# Patient Record
Sex: MALE | Race: WHITE | Employment: OTHER | URBAN - METROPOLITAN AREA
[De-identification: names, ages, dates, MRNs, and addresses within clinical notes are randomized per-mention and may not be internally consistent; named-entity substitution may affect disease eponyms.]

---

## 2017-03-22 PROCEDURE — 81003 URINALYSIS AUTO W/O SCOPE: CPT | Performed by: FAMILY MEDICINE

## 2017-03-22 PROCEDURE — 82570 ASSAY OF URINE CREATININE: CPT | Performed by: FAMILY MEDICINE

## 2017-03-22 PROCEDURE — 82043 UR ALBUMIN QUANTITATIVE: CPT | Performed by: FAMILY MEDICINE

## 2017-03-24 ENCOUNTER — APPOINTMENT (OUTPATIENT)
Dept: GENERAL RADIOLOGY | Facility: HOSPITAL | Age: 67
End: 2017-03-24
Attending: EMERGENCY MEDICINE
Payer: MEDICARE

## 2017-03-24 ENCOUNTER — HOSPITAL ENCOUNTER (EMERGENCY)
Facility: HOSPITAL | Age: 67
Discharge: HOME | End: 2017-03-24
Attending: EMERGENCY MEDICINE
Payer: MEDICARE

## 2017-03-24 VITALS
BODY MASS INDEX: 36.29 KG/M2 | SYSTOLIC BLOOD PRESSURE: 177 MMHG | OXYGEN SATURATION: 96 % | RESPIRATION RATE: 18 BRPM | HEIGHT: 69 IN | WEIGHT: 245 LBS | DIASTOLIC BLOOD PRESSURE: 85 MMHG | HEART RATE: 68 BPM | TEMPERATURE: 99 F

## 2017-03-24 DIAGNOSIS — S96.911A RIGHT FOOT STRAIN, INITIAL ENCOUNTER: Primary | ICD-10-CM

## 2017-03-24 PROCEDURE — 99283 EMERGENCY DEPT VISIT LOW MDM: CPT

## 2017-03-24 PROCEDURE — 73630 X-RAY EXAM OF FOOT: CPT

## 2017-03-24 RX ORDER — TRAMADOL HYDROCHLORIDE 50 MG/1
50 TABLET ORAL EVERY 8 HOURS PRN
Qty: 20 TABLET | Refills: 0 | Status: SHIPPED | OUTPATIENT
Start: 2017-03-24 | End: 2017-06-28 | Stop reason: ALTCHOICE

## 2017-03-24 NOTE — ED NOTES
Pt states he has a pair of crutches at home which he feels will be appropriate for his height. Awaiting cast shoe at this time from [employer] in proper size of pt.

## 2017-03-24 NOTE — ED PROVIDER NOTES
Patient Seen in: [facility] Emergency Department    History   Patient presents with:  Lower Extremity Injury (musculoskeletal)    Stated Complaint: toe injury    HPI    Patient is a 72-year-old male who presents emergency room with a history of slip total) by mouth every 8 (eight) hours as needed for Pain.    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 Patient had x-rays done as noted above. Patient was placed into a postop shoe and given crutches. Patient given prescription for Ultram for pain at home.   Patient was instructed to rest

## 2017-03-24 NOTE — ED NOTES
Pt states he was walking down the steps yesterday and right great toe got pulled back. Pt then fell, landing on right knee. Pt states he also struck his elbow, but states pain to elbow and knee is minimal. Pt states he is primarily having pain to toe.  Pt s

## 2017-03-24 NOTE — ED INITIAL ASSESSMENT (HPI)
Pt presents to the ED with complaints of right great toe pain. Pt states he fell last night and toe bent back. Pt awake and alert,skin w/d,resps reg/unlabored. + tenderness to toe with palpation.

## 2017-09-15 PROBLEM — K09.1: Status: ACTIVE | Noted: 2017-09-15

## 2017-09-20 ENCOUNTER — HOSPITAL ENCOUNTER (OUTPATIENT)
Facility: HOSPITAL | Age: 67
Setting detail: HOSPITAL OUTPATIENT SURGERY
Discharge: HOME | End: 2017-09-20
Attending: INTERNAL MEDICINE | Admitting: INTERNAL MEDICINE
Payer: MEDICARE

## 2017-09-20 ENCOUNTER — SURGERY (OUTPATIENT)
Age: 67
End: 2017-09-20

## 2017-09-20 VITALS
TEMPERATURE: 98 F | WEIGHT: 245 LBS | SYSTOLIC BLOOD PRESSURE: 141 MMHG | HEIGHT: 69 IN | HEART RATE: 62 BPM | DIASTOLIC BLOOD PRESSURE: 80 MMHG | BODY MASS INDEX: 36.29 KG/M2 | RESPIRATION RATE: 16 BRPM | OXYGEN SATURATION: 95 %

## 2017-09-20 DIAGNOSIS — Z80.0 FAMILY HX OF COLON CANCER: ICD-10-CM

## 2017-09-20 DIAGNOSIS — D12.6 ADENOMATOUS POLYP OF COLON, UNSPECIFIED PART OF COLON: ICD-10-CM

## 2017-09-20 LAB — GLUCOSE BLD-MCNC: 117 MG/DL (ref 65–99)

## 2017-09-20 PROCEDURE — 0DBL8ZX EXCISION OF TRANSVERSE COLON, VIA NATURAL OR ARTIFICIAL OPENING ENDOSCOPIC, DIAGNOSTIC: ICD-10-PCS | Performed by: INTERNAL MEDICINE

## 2017-09-20 PROCEDURE — 88305 TISSUE EXAM BY PATHOLOGIST: CPT | Performed by: INTERNAL MEDICINE

## 2017-09-20 PROCEDURE — 82962 GLUCOSE BLOOD TEST: CPT

## 2017-09-20 PROCEDURE — 0DBP8ZX EXCISION OF RECTUM, VIA NATURAL OR ARTIFICIAL OPENING ENDOSCOPIC, DIAGNOSTIC: ICD-10-PCS | Performed by: INTERNAL MEDICINE

## 2017-09-20 RX ORDER — SODIUM CHLORIDE, SODIUM LACTATE, POTASSIUM CHLORIDE, CALCIUM CHLORIDE 600; 310; 30; 20 MG/100ML; MG/100ML; MG/100ML; MG/100ML
INJECTION, SOLUTION INTRAVENOUS CONTINUOUS
Status: DISCONTINUED | OUTPATIENT
Start: 2017-09-20 | End: 2017-09-20

## 2017-09-20 RX ORDER — DEXTROSE MONOHYDRATE 25 G/50ML
50 INJECTION, SOLUTION INTRAVENOUS
Status: DISCONTINUED | OUTPATIENT
Start: 2017-09-20 | End: 2017-09-20

## 2017-09-20 RX ORDER — MIDAZOLAM HYDROCHLORIDE 1 MG/ML
INJECTION INTRAMUSCULAR; INTRAVENOUS
Status: DISCONTINUED | OUTPATIENT
Start: 2017-09-20 | End: 2017-09-20

## 2017-09-20 NOTE — H&P
[facility] Endoscopy Health History   [facility] Department of  Gastroenterology  Update Health History :       Radha Julian  male   Rohit Maxwell MD     [id number]  5/30/1950 Primary Care Physician  Anthony Pérez MD        HPI :  Martha Butcher Sister       Smoking status: Former Smoker                                                              Packs/day: 1.00      Years: 17.00        Quit date: 1/1/1986  Smokeless tobacco: Never Used                      Alcohol use:  No                 ALLERGI comprehensive 10 point review of systems was completed.       Physical Exam:    /83 (BP Location: Left arm)   Pulse 57   Temp 97.8 °F (36.6 °C) (Oral)   Resp 20   Ht 5' 9\" (1.753 m)   Wt 245 lb (111.1 kg)   SpO2 96%   BMI 36.18 kg/m²   GENERAL: well

## 2017-09-20 NOTE — OPERATIVE REPORT
[facility]    PATIENT'S NAME: Marley Kamini   ATTENDING PHYSICIAN: Suhas Song M.D. OPERATING PHYSICIAN: Suhas Song M.D.    PATIENT ACCOUNT#:   [de-identified]    LOCATION:  [address] RECORD #:   [id number] 01-Aug-2017 20:02 02-Aug-2017 02-Aug-2017 04-Aug-2017 18:48 11-Aug-2017 14:07 13-Aug-2017 17:20 29-Jul-2017 14:50 29-Jul-2017 16:57 31-Jul-2017 10:31 31-Jul-2017 12:48 04-Aug-2017 18:24

## 2017-09-20 NOTE — BRIEF OP NOTE
Pre-Operative Diagnosis: Family hx of colon cancer [Z80.0]  Adenomatous polyp of colon, unspecified part of colon [D12.6]     Post-Operative Diagnosis: polyps     Procedure Performed:   Procedure(s):  COLONOSCOPY with BX polypectomy    Surgeon(s) and Marion

## 2017-09-21 NOTE — PROGRESS NOTES
9/21/2017  Brendan6 Tash Mcpherson [id number]    Dear Ad Fernando,       Here are the  biopsy/pathology findings from your recent Colonoscopy :    and an adenomatous polyp(s), which is a benign potentially pre-cancerous growth that was re

## 2018-03-27 PROCEDURE — 82043 UR ALBUMIN QUANTITATIVE: CPT | Performed by: FAMILY MEDICINE

## 2018-03-27 PROCEDURE — 81001 URINALYSIS AUTO W/SCOPE: CPT | Performed by: FAMILY MEDICINE

## 2018-03-27 PROCEDURE — 82570 ASSAY OF URINE CREATININE: CPT | Performed by: FAMILY MEDICINE

## 2019-04-02 PROCEDURE — 81003 URINALYSIS AUTO W/O SCOPE: CPT | Performed by: FAMILY MEDICINE

## 2019-08-06 ENCOUNTER — HOSPITAL ENCOUNTER (OUTPATIENT)
Dept: CV DIAGNOSTICS | Facility: HOSPITAL | Age: 69
Discharge: HOME | End: 2019-08-06
Attending: FAMILY MEDICINE
Payer: MEDICARE

## 2019-08-06 DIAGNOSIS — R42 LIGHTHEADEDNESS: ICD-10-CM

## 2019-08-06 DIAGNOSIS — I42.2 HYPERTROPHIC CARDIOMYOPATHY (HCC): ICD-10-CM

## 2019-08-06 PROCEDURE — 93306 TTE W/DOPPLER COMPLETE: CPT | Performed by: FAMILY MEDICINE

## 2019-08-07 NOTE — PROGRESS NOTES
Beau Chino your echocardiogram came out improved. I sent a copy of this to your cardiologist.    If have any questions, please call or e-mail the office. Have a great week.   Dr. Brennan Romano

## 2021-03-09 PROBLEM — M75.81 ROTATOR CUFF TENDINITIS, RIGHT: Status: ACTIVE | Noted: 2021-03-09

## 2021-03-09 PROBLEM — N18.31 STAGE 3A CHRONIC KIDNEY DISEASE (HCC): Status: ACTIVE | Noted: 2021-03-09

## 2021-06-16 PROBLEM — N18.31 STAGE 3A CHRONIC KIDNEY DISEASE (HCC): Status: RESOLVED | Noted: 2021-03-09 | Resolved: 2021-06-16

## 2021-07-05 ENCOUNTER — HOSPITAL ENCOUNTER (EMERGENCY)
Facility: HOSPITAL | Age: 71
Discharge: HOME | End: 2021-07-06
Attending: EMERGENCY MEDICINE
Payer: MEDICARE

## 2021-07-05 DIAGNOSIS — M10.9 ACUTE GOUT INVOLVING TOE OF RIGHT FOOT, UNSPECIFIED CAUSE: ICD-10-CM

## 2021-07-05 DIAGNOSIS — R60.9 PERIPHERAL EDEMA: Primary | ICD-10-CM

## 2021-07-05 LAB
ALBUMIN SERPL-MCNC: 3.6 G/DL (ref 3.4–5)
ALBUMIN/GLOB SERPL: 0.9 {RATIO} (ref 1–2)
ALP LIVER SERPL-CCNC: 49 U/L
ALT SERPL-CCNC: 35 U/L
ANION GAP SERPL CALC-SCNC: 5 MMOL/L (ref 0–18)
AST SERPL-CCNC: 19 U/L (ref 15–37)
BASOPHILS # BLD AUTO: 0.05 X10(3) UL (ref 0–0.2)
BASOPHILS NFR BLD AUTO: 0.6 %
BILIRUB SERPL-MCNC: 0.4 MG/DL (ref 0.1–2)
BUN BLD-MCNC: 31 MG/DL (ref 7–18)
BUN/CREAT SERPL: 23 (ref 10–20)
CALCIUM BLD-MCNC: 9.1 MG/DL (ref 8.5–10.1)
CHLORIDE SERPL-SCNC: 105 MMOL/L (ref 98–112)
CO2 SERPL-SCNC: 26 MMOL/L (ref 21–32)
CREAT BLD-MCNC: 1.35 MG/DL
DEPRECATED RDW RBC AUTO: 39.8 FL (ref 35.1–46.3)
EOSINOPHIL # BLD AUTO: 0.25 X10(3) UL (ref 0–0.7)
EOSINOPHIL NFR BLD AUTO: 3 %
ERYTHROCYTE [DISTWIDTH] IN BLOOD BY AUTOMATED COUNT: 12.7 % (ref 11–15)
GLOBULIN PLAS-MCNC: 4 G/DL (ref 2.8–4.4)
GLUCOSE BLD-MCNC: 142 MG/DL (ref 70–99)
HCT VFR BLD AUTO: 39.6 %
HGB BLD-MCNC: 13.3 G/DL
IMM GRANULOCYTES # BLD AUTO: 0.02 X10(3) UL (ref 0–1)
IMM GRANULOCYTES NFR BLD: 0.2 %
LYMPHOCYTES # BLD AUTO: 1.68 X10(3) UL (ref 1–4)
LYMPHOCYTES NFR BLD AUTO: 20.1 %
M PROTEIN MFR SERPL ELPH: 7.6 G/DL (ref 6.4–8.2)
MCH RBC QN AUTO: 29 PG (ref 26–34)
MCHC RBC AUTO-ENTMCNC: 33.6 G/DL (ref 31–37)
MCV RBC AUTO: 86.3 FL
MONOCYTES # BLD AUTO: 1.01 X10(3) UL (ref 0.1–1)
MONOCYTES NFR BLD AUTO: 12.1 %
NEUTROPHILS # BLD AUTO: 5.36 X10 (3) UL (ref 1.5–7.7)
NEUTROPHILS # BLD AUTO: 5.36 X10(3) UL (ref 1.5–7.7)
NEUTROPHILS NFR BLD AUTO: 64 %
OSMOLALITY SERPL CALC.SUM OF ELEC: 291 MOSM/KG (ref 275–295)
PLATELET # BLD AUTO: 298 10(3)UL (ref 150–450)
POTASSIUM SERPL-SCNC: 4.1 MMOL/L (ref 3.5–5.1)
RBC # BLD AUTO: 4.59 X10(6)UL
SODIUM SERPL-SCNC: 136 MMOL/L (ref 136–145)
WBC # BLD AUTO: 8.4 X10(3) UL (ref 4–11)

## 2021-07-05 PROCEDURE — 99284 EMERGENCY DEPT VISIT MOD MDM: CPT

## 2021-07-05 PROCEDURE — 80053 COMPREHEN METABOLIC PANEL: CPT | Performed by: EMERGENCY MEDICINE

## 2021-07-05 PROCEDURE — 85025 COMPLETE CBC W/AUTO DIFF WBC: CPT | Performed by: EMERGENCY MEDICINE

## 2021-07-05 PROCEDURE — 36415 COLL VENOUS BLD VENIPUNCTURE: CPT

## 2021-07-06 ENCOUNTER — APPOINTMENT (OUTPATIENT)
Dept: ULTRASOUND IMAGING | Facility: HOSPITAL | Age: 71
End: 2021-07-06
Attending: EMERGENCY MEDICINE
Payer: MEDICARE

## 2021-07-06 VITALS
BODY MASS INDEX: 37.77 KG/M2 | TEMPERATURE: 98 F | SYSTOLIC BLOOD PRESSURE: 161 MMHG | RESPIRATION RATE: 18 BRPM | WEIGHT: 255 LBS | OXYGEN SATURATION: 97 % | HEART RATE: 66 BPM | HEIGHT: 69 IN | DIASTOLIC BLOOD PRESSURE: 72 MMHG

## 2021-07-06 LAB
BILIRUB UR QL STRIP.AUTO: NEGATIVE
CLARITY UR REFRACT.AUTO: CLEAR
GLUCOSE UR STRIP.AUTO-MCNC: NEGATIVE MG/DL
KETONES UR STRIP.AUTO-MCNC: NEGATIVE MG/DL
LEUKOCYTE ESTERASE UR QL STRIP.AUTO: NEGATIVE
NITRITE UR QL STRIP.AUTO: NEGATIVE
PH UR STRIP.AUTO: 6 [PH] (ref 5–8)
PROT UR STRIP.AUTO-MCNC: NEGATIVE MG/DL
RBC UR QL AUTO: NEGATIVE
SP GR UR STRIP.AUTO: 1.01 (ref 1–1.03)
UROBILINOGEN UR STRIP.AUTO-MCNC: <2 MG/DL

## 2021-07-06 PROCEDURE — 81003 URINALYSIS AUTO W/O SCOPE: CPT | Performed by: EMERGENCY MEDICINE

## 2021-07-06 PROCEDURE — 93970 EXTREMITY STUDY: CPT | Performed by: EMERGENCY MEDICINE

## 2021-07-06 RX ORDER — PREDNISONE 20 MG/1
40 TABLET ORAL DAILY
Qty: 10 TABLET | Refills: 0 | Status: SHIPPED | OUTPATIENT
Start: 2021-07-06 | End: 2021-07-11

## 2021-07-06 RX ORDER — INDOMETHACIN 25 MG/1
25 CAPSULE ORAL
Qty: 20 CAPSULE | Refills: 0 | Status: SHIPPED | OUTPATIENT
Start: 2021-07-06 | End: 2021-07-12

## 2021-07-06 NOTE — ED PROVIDER NOTES
Patient Seen in: [facility] Emergency Department      History   Patient presents with:  Swelling Edema    Stated Complaint: leg swelling, difficult to walk, no injury.  seen at [facility], negative xray    HPI/Subjective:   HPI    Any 51-year-old male wi ENDOSCOPY   • COLONOSCOPY N/A 9/20/2017    Procedure: COLONOSCOPY;  Surgeon:  Katie Lomeli MD;  Location: [address] [place] [address] ENDOSCOPY   • PERIPHERAL VASCULAR SCREENING HISTORICAL CONV Bilateral 5/22/2017    mild carotid narrowing, PAD screen   • VASCULAR LAB - DMG B motion. Cervical back: Normal range of motion and neck supple. Comments: 1+ pitting edema noted to the left lower extremity. Erythema tenderness swelling noted to the right first metatarsal base consistent with gout.    Skin:     General: Skin is for discharge                           Disposition and Plan     Clinical Impression:  Peripheral edema  (primary encounter diagnosis)  Acute gout involving toe of right foot, unspecified cause     Disposition:  Discharge  7/6/2021  1:11 am    Follow-up:

## 2021-08-25 ENCOUNTER — HOSPITAL ENCOUNTER (OUTPATIENT)
Dept: CV DIAGNOSTICS | Facility: HOSPITAL | Age: 71
Discharge: HOME | End: 2021-08-25
Attending: INTERNAL MEDICINE
Payer: MEDICARE

## 2021-08-25 DIAGNOSIS — I42.2 HYPERTROPHIC CARDIOMYOPATHY (HCC): ICD-10-CM

## 2021-08-25 PROCEDURE — 93306 TTE W/DOPPLER COMPLETE: CPT | Performed by: INTERNAL MEDICINE

## 2021-12-13 PROBLEM — E78.2 HYPERCHOLESTEROLEMIA WITH HYPERTRIGLYCERIDEMIA: Status: ACTIVE | Noted: 2021-12-13

## 2023-02-24 ENCOUNTER — LAB ENCOUNTER (OUTPATIENT)
Dept: LAB | Age: 73
End: 2023-02-24
Attending: INTERNAL MEDICINE
Payer: MEDICARE

## 2023-02-24 DIAGNOSIS — Z20.822 ENCOUNTER FOR PREOPERATIVE SCREENING LABORATORY TESTING FOR COVID-19 VIRUS: ICD-10-CM

## 2023-02-24 DIAGNOSIS — Z01.812 ENCOUNTER FOR PREOPERATIVE SCREENING LABORATORY TESTING FOR COVID-19 VIRUS: ICD-10-CM

## 2023-02-25 LAB — SARS-COV-2 RNA RESP QL NAA+PROBE: NOT DETECTED

## 2023-02-27 ENCOUNTER — ANESTHESIA (OUTPATIENT)
Dept: ENDOSCOPY | Facility: HOSPITAL | Age: 73
End: 2023-02-27
Payer: MEDICARE

## 2023-02-27 ENCOUNTER — HOSPITAL ENCOUNTER (OUTPATIENT)
Facility: HOSPITAL | Age: 73
Setting detail: HOSPITAL OUTPATIENT SURGERY
Discharge: HOME | End: 2023-02-27
Attending: INTERNAL MEDICINE | Admitting: INTERNAL MEDICINE
Payer: MEDICARE

## 2023-02-27 ENCOUNTER — ANESTHESIA EVENT (OUTPATIENT)
Dept: ENDOSCOPY | Facility: HOSPITAL | Age: 73
End: 2023-02-27
Payer: MEDICARE

## 2023-02-27 VITALS
HEART RATE: 75 BPM | BODY MASS INDEX: 34.76 KG/M2 | WEIGHT: 232 LBS | SYSTOLIC BLOOD PRESSURE: 99 MMHG | RESPIRATION RATE: 18 BRPM | OXYGEN SATURATION: 96 % | DIASTOLIC BLOOD PRESSURE: 55 MMHG | TEMPERATURE: 98 F | HEIGHT: 68.5 IN

## 2023-02-27 DIAGNOSIS — Z01.812 ENCOUNTER FOR PREOPERATIVE SCREENING LABORATORY TESTING FOR COVID-19 VIRUS: Primary | ICD-10-CM

## 2023-02-27 DIAGNOSIS — Z20.822 ENCOUNTER FOR PREOPERATIVE SCREENING LABORATORY TESTING FOR COVID-19 VIRUS: Primary | ICD-10-CM

## 2023-02-27 LAB — GLUCOSE BLD-MCNC: 126 MG/DL (ref 70–99)

## 2023-02-27 PROCEDURE — 88305 TISSUE EXAM BY PATHOLOGIST: CPT | Performed by: INTERNAL MEDICINE

## 2023-02-27 PROCEDURE — 88173 CYTOPATH EVAL FNA REPORT: CPT | Performed by: INTERNAL MEDICINE

## 2023-02-27 PROCEDURE — 0FDG8ZX EXTRACTION OF PANCREAS, VIA NATURAL OR ARTIFICIAL OPENING ENDOSCOPIC, DIAGNOSTIC: ICD-10-PCS | Performed by: INTERNAL MEDICINE

## 2023-02-27 PROCEDURE — 88172 CYTP DX EVAL FNA 1ST EA SITE: CPT | Performed by: INTERNAL MEDICINE

## 2023-02-27 PROCEDURE — 82962 GLUCOSE BLOOD TEST: CPT

## 2023-02-27 PROCEDURE — 88341 IMHCHEM/IMCYTCHM EA ADD ANTB: CPT | Performed by: INTERNAL MEDICINE

## 2023-02-27 PROCEDURE — 88342 IMHCHEM/IMCYTCHM 1ST ANTB: CPT | Performed by: INTERNAL MEDICINE

## 2023-02-27 PROCEDURE — BF47ZZZ ULTRASONOGRAPHY OF PANCREAS: ICD-10-PCS | Performed by: INTERNAL MEDICINE

## 2023-02-27 RX ORDER — LIDOCAINE HYDROCHLORIDE 10 MG/ML
INJECTION, SOLUTION EPIDURAL; INFILTRATION; INTRACAUDAL; PERINEURAL AS NEEDED
Status: DISCONTINUED | OUTPATIENT
Start: 2023-02-27 | End: 2023-02-27 | Stop reason: SURG

## 2023-02-27 RX ORDER — SODIUM CHLORIDE, SODIUM LACTATE, POTASSIUM CHLORIDE, CALCIUM CHLORIDE 600; 310; 30; 20 MG/100ML; MG/100ML; MG/100ML; MG/100ML
INJECTION, SOLUTION INTRAVENOUS CONTINUOUS
Status: DISCONTINUED | OUTPATIENT
Start: 2023-02-27 | End: 2023-02-27

## 2023-02-27 RX ORDER — NICOTINE POLACRILEX 4 MG
30 LOZENGE BUCCAL
Status: DISCONTINUED | OUTPATIENT
Start: 2023-02-27 | End: 2023-02-27

## 2023-02-27 RX ORDER — NALOXONE HYDROCHLORIDE 0.4 MG/ML
80 INJECTION, SOLUTION INTRAMUSCULAR; INTRAVENOUS; SUBCUTANEOUS AS NEEDED
Status: DISCONTINUED | OUTPATIENT
Start: 2023-02-27 | End: 2023-02-27

## 2023-02-27 RX ORDER — DEXTROSE MONOHYDRATE 25 G/50ML
50 INJECTION, SOLUTION INTRAVENOUS
Status: DISCONTINUED | OUTPATIENT
Start: 2023-02-27 | End: 2023-02-27

## 2023-02-27 RX ORDER — NICOTINE POLACRILEX 4 MG
15 LOZENGE BUCCAL
Status: DISCONTINUED | OUTPATIENT
Start: 2023-02-27 | End: 2023-02-27

## 2023-02-27 RX ADMIN — LIDOCAINE HYDROCHLORIDE 25 MG: 10 INJECTION, SOLUTION EPIDURAL; INFILTRATION; INTRACAUDAL; PERINEURAL at 15:50:00

## 2023-02-27 NOTE — OPERATIVE REPORT
OPERATIVE REPORT   PATIENT NAME: Laura Wright  MRN: [id number]  DATE OF OPERATION: 2/27/2023  PREOPERATIVE DIAGNOSIS:   Patient with recent diagnosis of neuroendocrine tumor cancer of the stomach. He was found to have pancreatic body lesion and referred for endoscopic ultrasound evaluation to rule out primary pancreatic neoplasia versus metastatic disease  POSTOPERATIVE DIAGNOSES:  Large ulcerated gastric mass with direct invasion into the pancreas  PROCEDURE PERFORMED: Upper endoscopic ultrasound with fine-needle aspiration  SURGEON: Mini Mitchell MD   MEDICATIONS: None    ANESTHESIA: MAC  CONSENT: The potential risks including but not limited to perforation, bleeding, infection, pancreatitis, the case reaction, complication leading up to prolonged hospital stay needing surgical discussed with the patient in details. He was given opportunity ask questions and all of his questions were answered. Alternatives and options were discussed with him and he signed informed this consent  SPECIMEN: Pancreatic body mass  COMPLICATIONS: None immediately apparent  PROCEDURE AND FINDINGS:     After achieving adequate sedation and placing the patient in the left lateral cubitus position the Olympus linear echoendoscope was introduced under direct visualization in the esophagus passed into the stomach and second portion of the duodenum. Just below the GE junction extending into the gastric body towards the distal aspect of it there was a deep ulcerated large mass involving the gastric wall. The mass appeared to invade the gastric wall and the serosa and further into the pancreatic body and tail with multiple enlarged lymph nodes. The pancreatic body and tail appeared to be hypoechoic as opposed to normal echogenicity of the head of the pancreas visualized from the duodenal bulb as well as second portion of the duodenum. The main pancreatic duct in the body and tail appeared to be intact.   Away from the gastric tumor we were able to biopsy the pancreatic mass with a 22-gauge [employer] fine-needle aspiration device after utilizing Doppler to ensure that there are no interposing blood vessels in the needle track. The scope at this point was removed    IMPRESSION:  Findings is most suggestive of direct invasion into the pancreas from the large gastric malignancy  RECOMMENDATIONS:  Await final biopsy results.   Patient to follow-up with Dr. Brianda Guerra, the above was discussed with her over the telephone  Rj Booker MD

## 2023-04-07 ENCOUNTER — APPOINTMENT (OUTPATIENT)
Dept: CT IMAGING | Facility: HOSPITAL | Age: 73
End: 2023-04-07
Attending: EMERGENCY MEDICINE
Payer: MEDICARE

## 2023-04-07 ENCOUNTER — HOSPITAL ENCOUNTER (INPATIENT)
Facility: HOSPITAL | Age: 73
LOS: 3 days | Discharge: HOME | End: 2023-04-10
Attending: EMERGENCY MEDICINE | Admitting: INTERNAL MEDICINE
Payer: MEDICARE

## 2023-04-07 DIAGNOSIS — K62.5 RECTAL BLEEDING: ICD-10-CM

## 2023-04-07 DIAGNOSIS — I10 ESSENTIAL HYPERTENSION, BENIGN: ICD-10-CM

## 2023-04-07 DIAGNOSIS — K31.89 GASTRIC MASS: Primary | ICD-10-CM

## 2023-04-07 DIAGNOSIS — D64.9 ANEMIA, UNSPECIFIED TYPE: ICD-10-CM

## 2023-04-07 DIAGNOSIS — D72.829 LEUKOCYTOSIS, UNSPECIFIED TYPE: ICD-10-CM

## 2023-04-07 DIAGNOSIS — R71.0 HEMOGLOBIN DROP: ICD-10-CM

## 2023-04-07 LAB
ALBUMIN SERPL-MCNC: 2.9 G/DL (ref 3.4–5)
ALBUMIN/GLOB SERPL: 0.7 {RATIO} (ref 1–2)
ALP LIVER SERPL-CCNC: 126 U/L
ALT SERPL-CCNC: 36 U/L
ANION GAP SERPL CALC-SCNC: 7 MMOL/L (ref 0–18)
ANTIBODY SCREEN: NEGATIVE
APTT PPP: 28.3 SECONDS (ref 23.3–35.6)
AST SERPL-CCNC: 16 U/L (ref 15–37)
BASOPHILS # BLD: 0 X10(3) UL (ref 0–0.2)
BASOPHILS NFR BLD: 0 %
BILIRUB SERPL-MCNC: 0.2 MG/DL (ref 0.1–2)
BUN BLD-MCNC: 43 MG/DL (ref 7–18)
CALCIUM BLD-MCNC: 9.2 MG/DL (ref 8.5–10.1)
CHLORIDE SERPL-SCNC: 105 MMOL/L (ref 98–112)
CO2 SERPL-SCNC: 24 MMOL/L (ref 21–32)
CREAT BLD-MCNC: 1.56 MG/DL
EOSINOPHIL # BLD: 0 X10(3) UL (ref 0–0.7)
EOSINOPHIL NFR BLD: 0 %
ERYTHROCYTE [DISTWIDTH] IN BLOOD BY AUTOMATED COUNT: 18 %
GFR SERPLBLD BASED ON 1.73 SQ M-ARVRAT: 47 ML/MIN/1.73M2 (ref 60–?)
GLOBULIN PLAS-MCNC: 4 G/DL (ref 2.8–4.4)
GLUCOSE BLD-MCNC: 137 MG/DL (ref 70–99)
GLUCOSE BLD-MCNC: 144 MG/DL (ref 70–99)
HCT VFR BLD AUTO: 22.5 %
HGB BLD-MCNC: 7.1 G/DL
INR BLD: 1.14 (ref 0.85–1.16)
LYMPHOCYTES NFR BLD: 13 %
LYMPHOCYTES NFR BLD: 3.25 X10(3) UL (ref 1–4)
MCH RBC QN AUTO: 25.9 PG (ref 26–34)
MCHC RBC AUTO-ENTMCNC: 31.6 G/DL (ref 31–37)
MCV RBC AUTO: 82.1 FL
METAMYELOCYTES # BLD: 0.25 X10(3) UL
METAMYELOCYTES NFR BLD: 1 %
MONOCYTES # BLD: 1.5 X10(3) UL (ref 0.1–1)
MONOCYTES NFR BLD: 6 %
MYELOCYTES # BLD: 0.25 X10(3) UL
MYELOCYTES NFR BLD: 1 %
NEUTROPHILS # BLD AUTO: 18.18 X10 (3) UL (ref 1.5–7.7)
NEUTROPHILS NFR BLD: 75 %
NEUTS BAND NFR BLD: 4 %
NEUTS HYPERSEG # BLD: 19.75 X10(3) UL (ref 1.5–7.7)
OSMOLALITY SERPL CALC.SUM OF ELEC: 295 MOSM/KG (ref 275–295)
PLATELET # BLD AUTO: 407 10(3)UL (ref 150–450)
PLATELET MORPHOLOGY: NORMAL
POTASSIUM SERPL-SCNC: 4.1 MMOL/L (ref 3.5–5.1)
PROCALCITONIN SERPL-MCNC: 0.12 NG/ML (ref ?–0.16)
PROT SERPL-MCNC: 6.9 G/DL (ref 6.4–8.2)
PROTHROMBIN TIME: 14.6 SECONDS (ref 11.6–14.8)
RBC # BLD AUTO: 2.74 X10(6)UL
RH BLOOD TYPE: POSITIVE
SARS-COV-2 RNA RESP QL NAA+PROBE: NOT DETECTED
SODIUM SERPL-SCNC: 136 MMOL/L (ref 136–145)
TOTAL CELLS COUNTED BLD: 100
WBC # BLD AUTO: 25 X10(3) UL (ref 4–11)

## 2023-04-07 PROCEDURE — 85027 COMPLETE CBC AUTOMATED: CPT | Performed by: EMERGENCY MEDICINE

## 2023-04-07 PROCEDURE — 96365 THER/PROPH/DIAG IV INF INIT: CPT

## 2023-04-07 PROCEDURE — 86920 COMPATIBILITY TEST SPIN: CPT

## 2023-04-07 PROCEDURE — 99291 CRITICAL CARE FIRST HOUR: CPT

## 2023-04-07 PROCEDURE — 85025 COMPLETE CBC W/AUTO DIFF WBC: CPT | Performed by: EMERGENCY MEDICINE

## 2023-04-07 PROCEDURE — 93010 ELECTROCARDIOGRAM REPORT: CPT

## 2023-04-07 PROCEDURE — 85610 PROTHROMBIN TIME: CPT | Performed by: EMERGENCY MEDICINE

## 2023-04-07 PROCEDURE — 86901 BLOOD TYPING SEROLOGIC RH(D): CPT | Performed by: EMERGENCY MEDICINE

## 2023-04-07 PROCEDURE — 85730 THROMBOPLASTIN TIME PARTIAL: CPT | Performed by: EMERGENCY MEDICINE

## 2023-04-07 PROCEDURE — 99285 EMERGENCY DEPT VISIT HI MDM: CPT

## 2023-04-07 PROCEDURE — 85007 BL SMEAR W/DIFF WBC COUNT: CPT | Performed by: EMERGENCY MEDICINE

## 2023-04-07 PROCEDURE — 71260 CT THORAX DX C+: CPT | Performed by: EMERGENCY MEDICINE

## 2023-04-07 PROCEDURE — 83036 HEMOGLOBIN GLYCOSYLATED A1C: CPT | Performed by: HOSPITALIST

## 2023-04-07 PROCEDURE — 93005 ELECTROCARDIOGRAM TRACING: CPT

## 2023-04-07 PROCEDURE — 86850 RBC ANTIBODY SCREEN: CPT | Performed by: EMERGENCY MEDICINE

## 2023-04-07 PROCEDURE — C9113 INJ PANTOPRAZOLE SODIUM, VIA: HCPCS | Performed by: EMERGENCY MEDICINE

## 2023-04-07 PROCEDURE — 74177 CT ABD & PELVIS W/CONTRAST: CPT | Performed by: EMERGENCY MEDICINE

## 2023-04-07 PROCEDURE — 82272 OCCULT BLD FECES 1-3 TESTS: CPT

## 2023-04-07 PROCEDURE — 80053 COMPREHEN METABOLIC PANEL: CPT | Performed by: EMERGENCY MEDICINE

## 2023-04-07 PROCEDURE — 30233N1 TRANSFUSION OF NONAUTOLOGOUS RED BLOOD CELLS INTO PERIPHERAL VEIN, PERCUTANEOUS APPROACH: ICD-10-PCS | Performed by: EMERGENCY MEDICINE

## 2023-04-07 PROCEDURE — 85018 HEMOGLOBIN: CPT | Performed by: HOSPITALIST

## 2023-04-07 PROCEDURE — 36430 TRANSFUSION BLD/BLD COMPNT: CPT

## 2023-04-07 PROCEDURE — 86900 BLOOD TYPING SEROLOGIC ABO: CPT | Performed by: EMERGENCY MEDICINE

## 2023-04-07 PROCEDURE — 84145 PROCALCITONIN (PCT): CPT | Performed by: EMERGENCY MEDICINE

## 2023-04-07 PROCEDURE — 82962 GLUCOSE BLOOD TEST: CPT

## 2023-04-07 RX ORDER — DEXTROSE MONOHYDRATE 25 G/50ML
50 INJECTION, SOLUTION INTRAVENOUS
Status: DISCONTINUED | OUTPATIENT
Start: 2023-04-07 | End: 2023-04-10

## 2023-04-07 RX ORDER — ONDANSETRON 4 MG/1
4 TABLET, ORALLY DISINTEGRATING ORAL EVERY 8 HOURS PRN
COMMUNITY

## 2023-04-07 RX ORDER — NICOTINE POLACRILEX 4 MG
30 LOZENGE BUCCAL
Status: DISCONTINUED | OUTPATIENT
Start: 2023-04-07 | End: 2023-04-10

## 2023-04-07 RX ORDER — CARVEDILOL 12.5 MG/1
25 TABLET ORAL 2 TIMES DAILY WITH MEALS
Status: DISCONTINUED | OUTPATIENT
Start: 2023-04-08 | End: 2023-04-10

## 2023-04-07 RX ORDER — MELATONIN
3 NIGHTLY
Status: DISCONTINUED | OUTPATIENT
Start: 2023-04-07 | End: 2023-04-10

## 2023-04-07 RX ORDER — FENOFIBRATE 67 MG/1
67 CAPSULE ORAL NIGHTLY
Status: DISCONTINUED | OUTPATIENT
Start: 2023-04-07 | End: 2023-04-10

## 2023-04-07 RX ORDER — ROSUVASTATIN CALCIUM 5 MG/1
10 TABLET, COATED ORAL NIGHTLY
Status: DISCONTINUED | OUTPATIENT
Start: 2023-04-07 | End: 2023-04-10

## 2023-04-07 RX ORDER — PROCHLORPERAZINE MALEATE 5 MG/1
5 TABLET ORAL EVERY 6 HOURS PRN
COMMUNITY

## 2023-04-07 RX ORDER — NICOTINE POLACRILEX 4 MG
15 LOZENGE BUCCAL
Status: DISCONTINUED | OUTPATIENT
Start: 2023-04-07 | End: 2023-04-10

## 2023-04-07 RX ORDER — SODIUM CHLORIDE 9 MG/ML
INJECTION, SOLUTION INTRAVENOUS CONTINUOUS
Status: DISCONTINUED | OUTPATIENT
Start: 2023-04-07 | End: 2023-04-10

## 2023-04-07 RX ORDER — ALLOPURINOL 300 MG/1
300 TABLET ORAL DAILY
Status: DISCONTINUED | OUTPATIENT
Start: 2023-04-08 | End: 2023-04-10

## 2023-04-07 RX ORDER — ONDANSETRON 2 MG/ML
4 INJECTION INTRAMUSCULAR; INTRAVENOUS EVERY 6 HOURS PRN
Status: DISCONTINUED | OUTPATIENT
Start: 2023-04-07 | End: 2023-04-10

## 2023-04-07 RX ORDER — ACETAMINOPHEN 10 MG/ML
1000 INJECTION, SOLUTION INTRAVENOUS EVERY 6 HOURS PRN
Status: DISCONTINUED | OUTPATIENT
Start: 2023-04-07 | End: 2023-04-10

## 2023-04-07 NOTE — ED INITIAL ASSESSMENT (HPI)
Pt reports he has stomach CA, has been fine except constipation. Had chemo 2 weeks ago. This whole week bowel movements have been normal, this morning had black in bowel movement x 3.

## 2023-04-08 ENCOUNTER — ANESTHESIA (OUTPATIENT)
Dept: ENDOSCOPY | Facility: HOSPITAL | Age: 73
End: 2023-04-08
Payer: MEDICARE

## 2023-04-08 ENCOUNTER — ANESTHESIA EVENT (OUTPATIENT)
Dept: ENDOSCOPY | Facility: HOSPITAL | Age: 73
End: 2023-04-08
Payer: MEDICARE

## 2023-04-08 LAB
ANION GAP SERPL CALC-SCNC: 8 MMOL/L (ref 0–18)
ATRIAL RATE: 86 BPM
BASOPHILS # BLD: 0 X10(3) UL (ref 0–0.2)
BASOPHILS NFR BLD: 0 %
BUN BLD-MCNC: 28 MG/DL (ref 7–18)
CALCIUM BLD-MCNC: 8.7 MG/DL (ref 8.5–10.1)
CHLORIDE SERPL-SCNC: 110 MMOL/L (ref 98–112)
CO2 SERPL-SCNC: 22 MMOL/L (ref 21–32)
CREAT BLD-MCNC: 1.21 MG/DL
EOSINOPHIL # BLD: 0 X10(3) UL (ref 0–0.7)
EOSINOPHIL NFR BLD: 0 %
ERYTHROCYTE [DISTWIDTH] IN BLOOD BY AUTOMATED COUNT: 17.7 %
EST. AVERAGE GLUCOSE BLD GHB EST-MCNC: 160 MG/DL (ref 68–126)
GFR SERPLBLD BASED ON 1.73 SQ M-ARVRAT: 64 ML/MIN/1.73M2 (ref 60–?)
GLUCOSE BLD-MCNC: 128 MG/DL (ref 70–99)
GLUCOSE BLD-MCNC: 136 MG/DL (ref 70–99)
GLUCOSE BLD-MCNC: 148 MG/DL (ref 70–99)
GLUCOSE BLD-MCNC: 155 MG/DL (ref 70–99)
GLUCOSE BLD-MCNC: 164 MG/DL (ref 70–99)
HBA1C MFR BLD: 7.2 % (ref ?–5.7)
HCT VFR BLD AUTO: 25.9 %
HGB BLD-MCNC: 6.8 G/DL
HGB BLD-MCNC: 8.2 G/DL
LYMPHOCYTES NFR BLD: 15 %
LYMPHOCYTES NFR BLD: 2.01 X10(3) UL (ref 1–4)
MCH RBC QN AUTO: 26.7 PG (ref 26–34)
MCHC RBC AUTO-ENTMCNC: 31.7 G/DL (ref 31–37)
MCV RBC AUTO: 84.4 FL
METAMYELOCYTES # BLD: 0.13 X10(3) UL
METAMYELOCYTES NFR BLD: 1 %
MONOCYTES # BLD: 0.54 X10(3) UL (ref 0.1–1)
MONOCYTES NFR BLD: 4 %
NEUTROPHILS # BLD AUTO: 9.72 X10 (3) UL (ref 1.5–7.7)
NEUTROPHILS NFR BLD: 76 %
NEUTS BAND NFR BLD: 4 %
NEUTS HYPERSEG # BLD: 10.72 X10(3) UL (ref 1.5–7.7)
NRBC BLD MANUAL-RTO: 1 %
OSMOLALITY SERPL CALC.SUM OF ELEC: 299 MOSM/KG (ref 275–295)
P AXIS: 29 DEGREES
P-R INTERVAL: 180 MS
PLATELET # BLD AUTO: 306 10(3)UL (ref 150–450)
PLATELET MORPHOLOGY: NORMAL
POTASSIUM SERPL-SCNC: 4.1 MMOL/L (ref 3.5–5.1)
Q-T INTERVAL: 396 MS
QRS DURATION: 116 MS
QTC CALCULATION (BEZET): 473 MS
R AXIS: -52 DEGREES
RBC # BLD AUTO: 3.07 X10(6)UL
SODIUM SERPL-SCNC: 140 MMOL/L (ref 136–145)
T AXIS: 73 DEGREES
TOTAL CELLS COUNTED BLD: 100
VENTRICULAR RATE: 86 BPM
WBC # BLD AUTO: 13.4 X10(3) UL (ref 4–11)

## 2023-04-08 PROCEDURE — C9113 INJ PANTOPRAZOLE SODIUM, VIA: HCPCS | Performed by: EMERGENCY MEDICINE

## 2023-04-08 PROCEDURE — 80048 BASIC METABOLIC PNL TOTAL CA: CPT | Performed by: HOSPITALIST

## 2023-04-08 PROCEDURE — 0DJ08ZZ INSPECTION OF UPPER INTESTINAL TRACT, VIA NATURAL OR ARTIFICIAL OPENING ENDOSCOPIC: ICD-10-PCS | Performed by: INTERNAL MEDICINE

## 2023-04-08 PROCEDURE — 85007 BL SMEAR W/DIFF WBC COUNT: CPT | Performed by: HOSPITALIST

## 2023-04-08 PROCEDURE — 85018 HEMOGLOBIN: CPT | Performed by: HOSPITALIST

## 2023-04-08 PROCEDURE — 82962 GLUCOSE BLOOD TEST: CPT

## 2023-04-08 PROCEDURE — 85027 COMPLETE CBC AUTOMATED: CPT | Performed by: HOSPITALIST

## 2023-04-08 PROCEDURE — 36430 TRANSFUSION BLD/BLD COMPNT: CPT

## 2023-04-08 PROCEDURE — 85025 COMPLETE CBC W/AUTO DIFF WBC: CPT | Performed by: HOSPITALIST

## 2023-04-08 RX ORDER — SODIUM CHLORIDE 9 MG/ML
INJECTION, SOLUTION INTRAVENOUS ONCE
Status: COMPLETED | OUTPATIENT
Start: 2023-04-08 | End: 2023-04-08

## 2023-04-08 RX ORDER — LIDOCAINE HYDROCHLORIDE 10 MG/ML
INJECTION, SOLUTION EPIDURAL; INFILTRATION; INTRACAUDAL; PERINEURAL AS NEEDED
Status: DISCONTINUED | OUTPATIENT
Start: 2023-04-08 | End: 2023-04-08 | Stop reason: SURG

## 2023-04-08 RX ORDER — NALOXONE HYDROCHLORIDE 0.4 MG/ML
80 INJECTION, SOLUTION INTRAMUSCULAR; INTRAVENOUS; SUBCUTANEOUS AS NEEDED
Status: DISCONTINUED | OUTPATIENT
Start: 2023-04-08 | End: 2023-04-08 | Stop reason: HOSPADM

## 2023-04-08 RX ORDER — SODIUM CHLORIDE, SODIUM LACTATE, POTASSIUM CHLORIDE, CALCIUM CHLORIDE 600; 310; 30; 20 MG/100ML; MG/100ML; MG/100ML; MG/100ML
INJECTION, SOLUTION INTRAVENOUS CONTINUOUS
Status: DISCONTINUED | OUTPATIENT
Start: 2023-04-08 | End: 2023-04-10

## 2023-04-08 RX ORDER — SUCRALFATE ORAL 1 G/10ML
1 SUSPENSION ORAL
Status: DISCONTINUED | OUTPATIENT
Start: 2023-04-08 | End: 2023-04-10

## 2023-04-08 RX ADMIN — SODIUM CHLORIDE: 9 INJECTION, SOLUTION INTRAVENOUS at 09:01:00

## 2023-04-08 RX ADMIN — LIDOCAINE HYDROCHLORIDE 50 MG: 10 INJECTION, SOLUTION EPIDURAL; INFILTRATION; INTRACAUDAL; PERINEURAL at 08:38:00

## 2023-04-08 NOTE — ANESTHESIA POSTPROCEDURE EVALUATION
[address] [facility] Patient Status:  Inpatient   Age/Gender 67year old male MRN [id number]   Location [address] [facility] 28 Attending Roni Dinh MD   [address] Day # 1 PCP Mikayla Vasquez MD       Anesthesia Post-op Note    ESOPHAGOGASTRODUODENOSCOPY (EGD)    Procedure Summary     Date: 04/08/23 Room / Location: [facility]4 Located within [facility] ENDOSCOPY 02 / 1404 Located within [facility] ENDOSCOPY    Anesthesia Start: 1280 Anesthesia Stop: 0901    Procedure: ESOPHAGOGASTRODUODENOSCOPY (EGD) Diagnosis:       GI bleed      (Ulcerated gastric mass)    Surgeons: Lily Avelar MD Anesthesiologist: Keya Younger MD    Anesthesia Type: MAC ASA Status: 3          Anesthesia Type: MAC    Vitals Value Taken Time   /58 04/08/23 0901   Temp  04/08/23 0902   Pulse 66 04/08/23 0901   Resp 11 04/08/23 0901   SpO2 97 % 04/08/23 0901   Vitals shown include unvalidated device data. Patient Location: Endoscopy    Anesthesia Type: MAC    Airway Patency: patent    Postop Pain Control: adequate    Mental Status: mildly sedated but able to meaningfully participate in the post-anesthesia evaluation    Nausea/Vomiting: none    Cardiopulmonary/Hydration status: stable euvolemic    Complications: no apparent anesthesia related complications    Postop vital signs: stable    Dental Exam: Unchanged from Preop    Patient to be discharged from PACU when criteria met.

## 2023-04-08 NOTE — PROGRESS NOTES
NURSING ADMISSION NOTE    Patient is A/O x4, admitted due to black BM this morning. Reported of BLE moderate weakness but resolved prior to ED arrival. 1 PRBC tolerated. Rapid covid done and negative. EGD consent for tomorrow signed. VSS and afebrile. Denies any ABD discomfort, no N/V. NPO compliant. Occasional tinnitus. Needs addressed. 0122 HGB result at 6.8 after PRBC, MD notified. Additional PRBC given. 0645 Improved HG to 8.2. Wife aware of EGD schedule. No noted bowel movement. Continue to deny discomfort. Patient admitted via [address] to room. Safety precautions initiated. Bed in low position. Call light in reach.

## 2023-04-08 NOTE — PLAN OF CARE
Pt alert/oriented x 4. Hgb 8.2, no bowel movement yet today. EGD this morning. Clear liquid diet post EGD. Abdomen soft, nondistended, bowel sounds present. Denies nausea/vomiting. Continue protonix drip 8/mg/hour. Wife at bedside. Denies pain. Vitals stable. Scheduled meds given per MAR. Updated pt re: plan of care. Needs addressed.      Problem: Diabetes/Glucose Control  Goal: Glucose maintained within prescribed range  Description: INTERVENTIONS:  - Monitor Blood Glucose as ordered  - Assess for signs and symptoms of hyperglycemia and hypoglycemia  - Administer ordered medications to maintain glucose within target range  - Assess barriers to adequate nutritional intake and initiate nutrition consult as needed  - Instruct patient on self management of diabetes  Outcome: Progressing     Problem: HEMATOLOGIC - ADULT  Goal: Maintains hematologic stability  Description: INTERVENTIONS  - Assess for signs and symptoms of bleeding or hemorrhage  - Monitor labs and vital signs for trends  - Administer supportive blood products/factors, fluids and medications as ordered and appropriate  - Administer supportive blood products/factors as ordered and appropriate  Outcome: Progressing

## 2023-04-09 LAB
ANION GAP SERPL CALC-SCNC: 5 MMOL/L (ref 0–18)
BASOPHILS # BLD: 0 X10(3) UL (ref 0–0.2)
BASOPHILS NFR BLD: 0 %
BLOOD TYPE BARCODE: 600
BLOOD TYPE BARCODE: 6200
BUN BLD-MCNC: 17 MG/DL (ref 7–18)
CALCIUM BLD-MCNC: 8.8 MG/DL (ref 8.5–10.1)
CHLORIDE SERPL-SCNC: 109 MMOL/L (ref 98–112)
CO2 SERPL-SCNC: 26 MMOL/L (ref 21–32)
CREAT BLD-MCNC: 1.01 MG/DL
EOSINOPHIL # BLD: 0 X10(3) UL (ref 0–0.7)
EOSINOPHIL NFR BLD: 0 %
ERYTHROCYTE [DISTWIDTH] IN BLOOD BY AUTOMATED COUNT: 17.5 %
GFR SERPLBLD BASED ON 1.73 SQ M-ARVRAT: 79 ML/MIN/1.73M2 (ref 60–?)
GLUCOSE BLD-MCNC: 145 MG/DL (ref 70–99)
GLUCOSE BLD-MCNC: 149 MG/DL (ref 70–99)
GLUCOSE BLD-MCNC: 153 MG/DL (ref 70–99)
GLUCOSE BLD-MCNC: 170 MG/DL (ref 70–99)
GLUCOSE BLD-MCNC: 188 MG/DL (ref 70–99)
HCT VFR BLD AUTO: 24.4 %
HGB BLD-MCNC: 7.5 G/DL
LYMPHOCYTES NFR BLD: 1.12 X10(3) UL (ref 1–4)
LYMPHOCYTES NFR BLD: 14 %
MCH RBC QN AUTO: 26.7 PG (ref 26–34)
MCHC RBC AUTO-ENTMCNC: 30.7 G/DL (ref 31–37)
MCV RBC AUTO: 86.8 FL
MONOCYTES # BLD: 0.32 X10(3) UL (ref 0.1–1)
MONOCYTES NFR BLD: 4 %
MORPHOLOGY: NORMAL
NEUTROPHILS # BLD AUTO: 5.25 X10 (3) UL (ref 1.5–7.7)
NEUTROPHILS NFR BLD: 81 %
NEUTS BAND NFR BLD: 1 %
NEUTS HYPERSEG # BLD: 6.56 X10(3) UL (ref 1.5–7.7)
NRBC BLD MANUAL-RTO: 1 %
OSMOLALITY SERPL CALC.SUM OF ELEC: 294 MOSM/KG (ref 275–295)
PLATELET # BLD AUTO: 266 10(3)UL (ref 150–450)
PLATELET MORPHOLOGY: NORMAL
POTASSIUM SERPL-SCNC: 4 MMOL/L (ref 3.5–5.1)
RBC # BLD AUTO: 2.81 X10(6)UL
SODIUM SERPL-SCNC: 140 MMOL/L (ref 136–145)
TOTAL CELLS COUNTED BLD: 100
UNIT VOLUME: 350 ML
UNIT VOLUME: 350 ML
WBC # BLD AUTO: 8 X10(3) UL (ref 4–11)

## 2023-04-09 PROCEDURE — 85025 COMPLETE CBC W/AUTO DIFF WBC: CPT | Performed by: HOSPITALIST

## 2023-04-09 PROCEDURE — 36430 TRANSFUSION BLD/BLD COMPNT: CPT

## 2023-04-09 PROCEDURE — 82962 GLUCOSE BLOOD TEST: CPT

## 2023-04-09 PROCEDURE — 85027 COMPLETE CBC AUTOMATED: CPT | Performed by: HOSPITALIST

## 2023-04-09 PROCEDURE — 85007 BL SMEAR W/DIFF WBC COUNT: CPT | Performed by: HOSPITALIST

## 2023-04-09 PROCEDURE — C9113 INJ PANTOPRAZOLE SODIUM, VIA: HCPCS | Performed by: EMERGENCY MEDICINE

## 2023-04-09 PROCEDURE — 80048 BASIC METABOLIC PNL TOTAL CA: CPT | Performed by: HOSPITALIST

## 2023-04-09 RX ORDER — INDAPAMIDE 2.5 MG/1
2.5 TABLET, FILM COATED ORAL DAILY
Status: DISCONTINUED | OUTPATIENT
Start: 2023-04-09 | End: 2023-04-10

## 2023-04-09 NOTE — PLAN OF CARE
Patient alert and oriented x 4, vitals stable, denies pain and sob. IVF infusing, tolerated med no nausea noted. Voiding, no bm tonight. , no insulin given per MAR. On continuous protonix gtt. Call light within reach, will monitor.

## 2023-04-09 NOTE — PROGRESS NOTES
Pt received one unit of RBC per order. Tolerated unit without and reactions. Resting in bed no further questions at this time.

## 2023-04-10 VITALS
DIASTOLIC BLOOD PRESSURE: 62 MMHG | WEIGHT: 213 LBS | SYSTOLIC BLOOD PRESSURE: 145 MMHG | BODY MASS INDEX: 32.28 KG/M2 | RESPIRATION RATE: 16 BRPM | OXYGEN SATURATION: 97 % | TEMPERATURE: 98 F | HEART RATE: 61 BPM | HEIGHT: 68 IN

## 2023-04-10 LAB
ANION GAP SERPL CALC-SCNC: 5 MMOL/L (ref 0–18)
BASOPHILS # BLD: 0.08 X10(3) UL (ref 0–0.2)
BASOPHILS NFR BLD: 1 %
BLOOD TYPE BARCODE: 6200
BUN BLD-MCNC: 10 MG/DL (ref 7–18)
CALCIUM BLD-MCNC: 9.3 MG/DL (ref 8.5–10.1)
CHLORIDE SERPL-SCNC: 110 MMOL/L (ref 98–112)
CO2 SERPL-SCNC: 27 MMOL/L (ref 21–32)
CREAT BLD-MCNC: 1 MG/DL
EOSINOPHIL # BLD: 0.08 X10(3) UL (ref 0–0.7)
EOSINOPHIL NFR BLD: 1 %
ERYTHROCYTE [DISTWIDTH] IN BLOOD BY AUTOMATED COUNT: 17 %
GFR SERPLBLD BASED ON 1.73 SQ M-ARVRAT: 80 ML/MIN/1.73M2 (ref 60–?)
GLUCOSE BLD-MCNC: 135 MG/DL (ref 70–99)
GLUCOSE BLD-MCNC: 139 MG/DL (ref 70–99)
GLUCOSE BLD-MCNC: 146 MG/DL (ref 70–99)
HCT VFR BLD AUTO: 27.9 %
HGB BLD-MCNC: 9 G/DL
LYMPHOCYTES NFR BLD: 1.46 X10(3) UL (ref 1–4)
LYMPHOCYTES NFR BLD: 18 %
MCH RBC QN AUTO: 27.8 PG (ref 26–34)
MCHC RBC AUTO-ENTMCNC: 32.3 G/DL (ref 31–37)
MCV RBC AUTO: 86.1 FL
MONOCYTES # BLD: 0.32 X10(3) UL (ref 0.1–1)
MONOCYTES NFR BLD: 4 %
NEUTROPHILS # BLD AUTO: 5.44 X10 (3) UL (ref 1.5–7.7)
NEUTROPHILS NFR BLD: 76 %
NEUTS HYPERSEG # BLD: 6.16 X10(3) UL (ref 1.5–7.7)
OSMOLALITY SERPL CALC.SUM OF ELEC: 295 MOSM/KG (ref 275–295)
PLATELET # BLD AUTO: 279 10(3)UL (ref 150–450)
PLATELET MORPHOLOGY: NORMAL
POTASSIUM SERPL-SCNC: 3.9 MMOL/L (ref 3.5–5.1)
RBC # BLD AUTO: 3.24 X10(6)UL
SODIUM SERPL-SCNC: 142 MMOL/L (ref 136–145)
TOTAL CELLS COUNTED BLD: 100
UNIT VOLUME: 350 ML
WBC # BLD AUTO: 8.1 X10(3) UL (ref 4–11)

## 2023-04-10 PROCEDURE — 80048 BASIC METABOLIC PNL TOTAL CA: CPT | Performed by: HOSPITALIST

## 2023-04-10 PROCEDURE — 82962 GLUCOSE BLOOD TEST: CPT

## 2023-04-10 PROCEDURE — C9113 INJ PANTOPRAZOLE SODIUM, VIA: HCPCS | Performed by: EMERGENCY MEDICINE

## 2023-04-10 PROCEDURE — 85025 COMPLETE CBC W/AUTO DIFF WBC: CPT | Performed by: HOSPITALIST

## 2023-04-10 PROCEDURE — 85007 BL SMEAR W/DIFF WBC COUNT: CPT | Performed by: HOSPITALIST

## 2023-04-10 PROCEDURE — 85027 COMPLETE CBC AUTOMATED: CPT | Performed by: HOSPITALIST

## 2023-04-10 RX ORDER — PANTOPRAZOLE SODIUM 40 MG/1
40 TABLET, DELAYED RELEASE ORAL
Status: DISCONTINUED | OUTPATIENT
Start: 2023-04-10 | End: 2023-04-10

## 2023-04-10 RX ORDER — SUCRALFATE ORAL 1 G/10ML
1 SUSPENSION ORAL
Qty: 1200 ML | Refills: 0 | Status: SHIPPED | OUTPATIENT
Start: 2023-04-10 | End: 2023-05-10

## 2023-04-10 RX ORDER — PANTOPRAZOLE SODIUM 40 MG/1
40 TABLET, DELAYED RELEASE ORAL
Qty: 60 TABLET | Refills: 0 | Status: SHIPPED | OUTPATIENT
Start: 2023-04-10

## 2023-04-10 RX ORDER — BENAZEPRIL HYDROCHLORIDE 40 MG/1
20 TABLET, FILM COATED ORAL DAILY
Status: SHIPPED | COMMUNITY
Start: 2023-04-10

## 2023-04-10 NOTE — PLAN OF CARE
Received pt alert and orientated x4. VSS. No sob on RA. Afebrile. No c/o pain. Hbg 9.0. No bleeding noted. Protonix gtt infusing. All meds given per [facility]. Tolerating diet. Up and voiding. Plan to DC later today. Fall precautions in place. Call light within reach. 1430: Pt cleared for DC. IVs removed. DC paperwork provided, pt and wife verbalized understanding. All pt belongings gathered and sent with the pt. NURSING DISCHARGE NOTE    Discharged Home via Wheelchair. Accompanied by RN  Belongings Taken by patient/family.     Problem: Diabetes/Glucose Control  Goal: Glucose maintained within prescribed range  Description: INTERVENTIONS:  - Monitor Blood Glucose as ordered  - Assess for signs and symptoms of hyperglycemia and hypoglycemia  - Administer ordered medications to maintain glucose within target range  - Assess barriers to adequate nutritional intake and initiate nutrition consult as needed  - Instruct patient on self management of diabetes  Outcome: Progressing     Problem: HEMATOLOGIC - ADULT  Goal: Maintains hematologic stability  Description: INTERVENTIONS  - Assess for signs and symptoms of bleeding or hemorrhage  - Monitor labs and vital signs for trends  - Administer supportive blood products/factors, fluids and medications as ordered and appropriate  - Administer supportive blood products/factors as ordered and appropriate  Outcome: Progressing

## 2023-04-10 NOTE — PLAN OF CARE
Pt a/o x4. VSS on RA. No c/o pain at the moment. Meds per MAR. Protonix gtt still infusing per order. No bleeding reported. No acute events overnight. Call light within reach.      Problem: Diabetes/Glucose Control  Goal: Glucose maintained within prescribed range  Description: INTERVENTIONS:  - Monitor Blood Glucose as ordered  - Assess for signs and symptoms of hyperglycemia and hypoglycemia  - Administer ordered medications to maintain glucose within target range  - Assess barriers to adequate nutritional intake and initiate nutrition consult as needed  - Instruct patient on self management of diabetes  Outcome: Progressing     Problem: HEMATOLOGIC - ADULT  Goal: Maintains hematologic stability  Description: INTERVENTIONS  - Assess for signs and symptoms of bleeding or hemorrhage  - Monitor labs and vital signs for trends  - Administer supportive blood products/factors, fluids and medications as ordered and appropriate  - Administer supportive blood products/factors as ordered and appropriate  Outcome: Progressing

## 2023-12-18 RX ORDER — BENAZEPRIL HYDROCHLORIDE 20 MG/1
20 TABLET ORAL DAILY
COMMUNITY

## 2023-12-19 ENCOUNTER — ANESTHESIA EVENT (OUTPATIENT)
Dept: SURGERY | Facility: HOSPITAL | Age: 73
End: 2023-12-19
Payer: MEDICARE

## 2023-12-21 ENCOUNTER — ANESTHESIA (OUTPATIENT)
Dept: SURGERY | Facility: HOSPITAL | Age: 73
End: 2023-12-21
Payer: MEDICARE

## 2023-12-21 ENCOUNTER — APPOINTMENT (OUTPATIENT)
Dept: GENERAL RADIOLOGY | Facility: HOSPITAL | Age: 73
End: 2023-12-21
Attending: SURGERY
Payer: MEDICARE

## 2023-12-21 ENCOUNTER — HOSPITAL ENCOUNTER (OUTPATIENT)
Facility: HOSPITAL | Age: 73
Setting detail: HOSPITAL OUTPATIENT SURGERY
Discharge: HOME | End: 2023-12-21
Attending: SURGERY | Admitting: SURGERY
Payer: MEDICARE

## 2023-12-21 VITALS
HEIGHT: 67 IN | RESPIRATION RATE: 18 BRPM | OXYGEN SATURATION: 93 % | TEMPERATURE: 98 F | DIASTOLIC BLOOD PRESSURE: 69 MMHG | SYSTOLIC BLOOD PRESSURE: 136 MMHG | BODY MASS INDEX: 33.12 KG/M2 | WEIGHT: 211 LBS | HEART RATE: 71 BPM

## 2023-12-21 DIAGNOSIS — Z95.828 PORT-A-CATH IN PLACE: Primary | ICD-10-CM

## 2023-12-21 LAB — GLUCOSE BLD-MCNC: 148 MG/DL (ref 70–99)

## 2023-12-21 PROCEDURE — 0JPT0WZ REMOVAL OF TOTALLY IMPLANTABLE VASCULAR ACCESS DEVICE FROM TRUNK SUBCUTANEOUS TISSUE AND FASCIA, OPEN APPROACH: ICD-10-PCS | Performed by: SURGERY

## 2023-12-21 PROCEDURE — 77001 FLUOROGUIDE FOR VEIN DEVICE: CPT | Performed by: SURGERY

## 2023-12-21 PROCEDURE — 82962 GLUCOSE BLOOD TEST: CPT

## 2023-12-21 PROCEDURE — 0JH60XZ INSERTION OF TUNNELED VASCULAR ACCESS DEVICE INTO CHEST SUBCUTANEOUS TISSUE AND FASCIA, OPEN APPROACH: ICD-10-PCS | Performed by: SURGERY

## 2023-12-21 DEVICE — POWERPORT CLEARVUE SLIM WITH SMOOTH SEPTUM, 8F POLYURETHANE CATHETER, SUTURE PLUGS, INTERMEDIATE KIT
Type: IMPLANTABLE DEVICE | Site: CHEST | Status: FUNCTIONAL
Brand: POWERPORT CLEARVUE

## 2023-12-21 RX ORDER — ACETAMINOPHEN 500 MG
TABLET ORAL
Status: COMPLETED
Start: 2023-12-21 | End: 2023-12-21

## 2023-12-21 RX ORDER — BUPIVACAINE HYDROCHLORIDE 5 MG/ML
INJECTION, SOLUTION EPIDURAL; INTRACAUDAL AS NEEDED
Status: DISCONTINUED | OUTPATIENT
Start: 2023-12-21 | End: 2023-12-21 | Stop reason: HOSPADM

## 2023-12-21 RX ORDER — ACETAMINOPHEN 500 MG
500 TABLET ORAL EVERY 6 HOURS PRN
Status: DISCONTINUED | OUTPATIENT
Start: 2023-12-21 | End: 2023-12-21

## 2023-12-21 RX ORDER — ACETAMINOPHEN 500 MG
1000 TABLET ORAL ONCE
Status: DISCONTINUED | OUTPATIENT
Start: 2023-12-21 | End: 2023-12-21 | Stop reason: HOSPADM

## 2023-12-21 RX ORDER — LIDOCAINE HYDROCHLORIDE AND EPINEPHRINE 10; 10 MG/ML; UG/ML
INJECTION, SOLUTION INFILTRATION; PERINEURAL AS NEEDED
Status: DISCONTINUED | OUTPATIENT
Start: 2023-12-21 | End: 2023-12-21 | Stop reason: HOSPADM

## 2023-12-21 RX ORDER — NICOTINE POLACRILEX 4 MG
15 LOZENGE BUCCAL
Status: DISCONTINUED | OUTPATIENT
Start: 2023-12-21 | End: 2023-12-21 | Stop reason: HOSPADM

## 2023-12-21 RX ORDER — ACETAMINOPHEN 500 MG
1000 TABLET ORAL EVERY 6 HOURS PRN
Status: DISCONTINUED | OUTPATIENT
Start: 2023-12-21 | End: 2023-12-21

## 2023-12-21 RX ORDER — LIDOCAINE HYDROCHLORIDE 10 MG/ML
INJECTION, SOLUTION EPIDURAL; INFILTRATION; INTRACAUDAL; PERINEURAL AS NEEDED
Status: DISCONTINUED | OUTPATIENT
Start: 2023-12-21 | End: 2023-12-21 | Stop reason: SURG

## 2023-12-21 RX ORDER — DEXTROSE MONOHYDRATE 25 G/50ML
50 INJECTION, SOLUTION INTRAVENOUS
Status: DISCONTINUED | OUTPATIENT
Start: 2023-12-21 | End: 2023-12-21 | Stop reason: HOSPADM

## 2023-12-21 RX ORDER — HYDROCODONE BITARTRATE AND ACETAMINOPHEN 5; 325 MG/1; MG/1
1-2 TABLET ORAL
Qty: 20 TABLET | Refills: 0 | Status: SHIPPED | OUTPATIENT
Start: 2023-12-21 | End: 2023-12-31

## 2023-12-21 RX ORDER — HYDROMORPHONE HYDROCHLORIDE 1 MG/ML
0.2 INJECTION, SOLUTION INTRAMUSCULAR; INTRAVENOUS; SUBCUTANEOUS EVERY 5 MIN PRN
Status: DISCONTINUED | OUTPATIENT
Start: 2023-12-21 | End: 2023-12-21

## 2023-12-21 RX ORDER — NICOTINE POLACRILEX 4 MG
30 LOZENGE BUCCAL
Status: DISCONTINUED | OUTPATIENT
Start: 2023-12-21 | End: 2023-12-21 | Stop reason: HOSPADM

## 2023-12-21 RX ORDER — CEFAZOLIN SODIUM/WATER 2 G/20 ML
2 SYRINGE (ML) INTRAVENOUS ONCE
Status: COMPLETED | OUTPATIENT
Start: 2023-12-21 | End: 2023-12-21

## 2023-12-21 RX ORDER — SODIUM CHLORIDE, SODIUM LACTATE, POTASSIUM CHLORIDE, CALCIUM CHLORIDE 600; 310; 30; 20 MG/100ML; MG/100ML; MG/100ML; MG/100ML
INJECTION, SOLUTION INTRAVENOUS CONTINUOUS
Status: DISCONTINUED | OUTPATIENT
Start: 2023-12-21 | End: 2023-12-21

## 2023-12-21 RX ORDER — NALOXONE HYDROCHLORIDE 0.4 MG/ML
0.08 INJECTION, SOLUTION INTRAMUSCULAR; INTRAVENOUS; SUBCUTANEOUS AS NEEDED
Status: DISCONTINUED | OUTPATIENT
Start: 2023-12-21 | End: 2023-12-21

## 2023-12-21 RX ADMIN — LIDOCAINE HYDROCHLORIDE 50 MG: 10 INJECTION, SOLUTION EPIDURAL; INFILTRATION; INTRACAUDAL; PERINEURAL at 12:48:00

## 2023-12-21 RX ADMIN — CEFAZOLIN SODIUM/WATER 2 G: 2 G/20 ML SYRINGE (ML) INTRAVENOUS at 12:51:00

## 2023-12-21 NOTE — OPERATIVE REPORT
[address]                                                         Operative Note    David Barajas Location: [facility] Perioperative   CSN [id number] MRN [id number]   Admission Date 12/21/2023 Procedure Date 12/21/2023   Attending Physician Lady Gonzalez MD Procedure Physician Serafin Severance, MD     Pre-Operative Diagnosis: NEOPLASM OF STOMACH     Post-Operative Diagnosis: NEOPLASM OF STOMACH    Procedure Performed: REMOVAL OF PORT, PLACEMENT OF POWER PORT WITH FLUOROSCOPIC GUIDANCE    Surgeon: Serafin Severance, MD     Assistant(s):          Anesthesia: MAC       Complications: none       Estimated Blood Loss: Blood Output: 5 mL (12/21/2023  1:41 PM)              Operative Summary: Patient was taken to the operating room placed in a supine position. He was prepped and draped in usual fashion after being placed under sedation. He had a right-sided portacatheter in place that was nonfunctioning. I cut down to the entrance point of the catheter and brought this out the wound. The catheter was divided. A wire was initially placed on this catheter but was unable to advance. Catheter was removed. Cutdown also on the hub which was also removed. Patient was placed in Trendelenburg position the subclavian vein was percutaneously cannulated. Glidewire was inserted and advanced to the superior vena cava under fluoroscopic guidance. The inflammatory shell of the previous port site was removed. The dilator introducer sheath was placed over the wire followed by advancing the 8 [place] Bard catheter through this introducer all the way down to the junction between the superior vena cava and the right atrium. This was all done under fluoroscopic guidance. Catheter was subcutaneously tunneled and exit out of the port reservoir site. Catheter was attached to the new port which was secured into the pocket. Catheter was flushed and aspirated without difficulty with heparinized saline.   Catheter was secured to the port with Prolene suture. Wound was closed with absorbable suture. Sterile dressings were applied.   Patient was awoken taken recovery in satisfactory condition        Betina Witt MD  12/21/2023  2:00 PM

## 2024-02-05 ENCOUNTER — NURSE ONLY (OUTPATIENT)
Dept: HEMATOLOGY/ONCOLOGY | Facility: HOSPITAL | Age: 74
End: 2024-02-05
Attending: INTERNAL MEDICINE
Payer: MEDICARE

## 2024-02-05 DIAGNOSIS — D64.9 ANEMIA, UNSPECIFIED TYPE: ICD-10-CM

## 2024-02-05 DIAGNOSIS — D46.9 MDS (MYELODYSPLASTIC SYNDROME) (HCC): ICD-10-CM

## 2024-02-05 LAB
ANTIBODY SCREEN: NEGATIVE
RH BLOOD TYPE: POSITIVE

## 2024-02-05 PROCEDURE — 86850 RBC ANTIBODY SCREEN: CPT

## 2024-02-05 PROCEDURE — 86901 BLOOD TYPING SEROLOGIC RH(D): CPT

## 2024-02-05 PROCEDURE — 36415 COLL VENOUS BLD VENIPUNCTURE: CPT

## 2024-02-05 PROCEDURE — 86900 BLOOD TYPING SEROLOGIC ABO: CPT

## 2024-02-06 ENCOUNTER — OFFICE VISIT (OUTPATIENT)
Dept: HEMATOLOGY/ONCOLOGY | Facility: HOSPITAL | Age: 74
End: 2024-02-06
Attending: INTERNAL MEDICINE
Payer: MEDICARE

## 2024-02-06 VITALS
SYSTOLIC BLOOD PRESSURE: 124 MMHG | TEMPERATURE: 98 F | RESPIRATION RATE: 16 BRPM | OXYGEN SATURATION: 97 % | DIASTOLIC BLOOD PRESSURE: 74 MMHG | HEART RATE: 69 BPM

## 2024-02-06 DIAGNOSIS — D46.9 MDS (MYELODYSPLASTIC SYNDROME) (HCC): Primary | ICD-10-CM

## 2024-02-06 DIAGNOSIS — D64.9 ANEMIA, UNSPECIFIED TYPE: ICD-10-CM

## 2024-02-06 PROCEDURE — 36430 TRANSFUSION BLD/BLD COMPNT: CPT

## 2024-02-06 NOTE — PROGRESS NOTES
Education Record    Learner:  Patient    Disease / Diagnosis: Here for blood transfusion.     Barriers / Limitations:  None    Method:  Brief focused, printed material and  reinforcement    General Topics:  Plan of care reviewed    Outcome:  Shows understanding. 1 unit of PRBC administered. Consent obtained. Port used. Left in stable condition.

## 2024-02-23 ENCOUNTER — HOSPITAL ENCOUNTER (EMERGENCY)
Facility: HOSPITAL | Age: 74
Discharge: HOME | End: 2024-02-23
Attending: EMERGENCY MEDICINE
Payer: MEDICARE

## 2024-02-23 VITALS
OXYGEN SATURATION: 100 % | WEIGHT: 200 LBS | RESPIRATION RATE: 13 BRPM | BODY MASS INDEX: 31.39 KG/M2 | DIASTOLIC BLOOD PRESSURE: 65 MMHG | SYSTOLIC BLOOD PRESSURE: 126 MMHG | TEMPERATURE: 98 F | HEART RATE: 54 BPM | HEIGHT: 67 IN

## 2024-02-23 DIAGNOSIS — D64.9 ANEMIA, UNSPECIFIED TYPE: Primary | ICD-10-CM

## 2024-02-23 LAB
ANION GAP SERPL CALC-SCNC: 5 MMOL/L (ref 0–18)
ANTIBODY SCREEN: NEGATIVE
BASOPHILS # BLD AUTO: 0.02 X10(3) UL (ref 0–0.2)
BASOPHILS NFR BLD AUTO: 0.9 %
BUN BLD-MCNC: 21 MG/DL (ref 9–23)
CALCIUM BLD-MCNC: 9 MG/DL (ref 8.5–10.1)
CHLORIDE SERPL-SCNC: 106 MMOL/L (ref 98–112)
CO2 SERPL-SCNC: 26 MMOL/L (ref 21–32)
CREAT BLD-MCNC: 0.95 MG/DL
EGFRCR SERPLBLD CKD-EPI 2021: 85 ML/MIN/1.73M2 (ref 60–?)
EOSINOPHIL # BLD AUTO: 0.03 X10(3) UL (ref 0–0.7)
EOSINOPHIL NFR BLD AUTO: 1.4 %
ERYTHROCYTE [DISTWIDTH] IN BLOOD BY AUTOMATED COUNT: 18.4 %
GLUCOSE BLD-MCNC: 187 MG/DL (ref 70–99)
HCT VFR BLD AUTO: 21.7 %
HGB BLD-MCNC: 7 G/DL
IMM GRANULOCYTES # BLD AUTO: 0.02 X10(3) UL (ref 0–1)
IMM GRANULOCYTES NFR BLD: 0.9 %
LYMPHOCYTES # BLD AUTO: 0.98 X10(3) UL (ref 1–4)
LYMPHOCYTES NFR BLD AUTO: 46.4 %
MCH RBC QN AUTO: 30.3 PG (ref 26–34)
MCHC RBC AUTO-ENTMCNC: 32.3 G/DL (ref 31–37)
MCV RBC AUTO: 93.9 FL
MONOCYTES # BLD AUTO: 0.16 X10(3) UL (ref 0.1–1)
MONOCYTES NFR BLD AUTO: 7.6 %
NEUTROPHILS # BLD AUTO: 0.9 X10 (3) UL (ref 1.5–7.7)
NEUTROPHILS # BLD AUTO: 0.9 X10(3) UL (ref 1.5–7.7)
NEUTROPHILS NFR BLD AUTO: 42.8 %
OSMOLALITY SERPL CALC.SUM OF ELEC: 292 MOSM/KG (ref 275–295)
PLATELET # BLD AUTO: 28 10(3)UL (ref 150–450)
POTASSIUM SERPL-SCNC: 3.8 MMOL/L (ref 3.5–5.1)
RBC # BLD AUTO: 2.31 X10(6)UL
RH BLOOD TYPE: POSITIVE
SODIUM SERPL-SCNC: 137 MMOL/L (ref 136–145)
WBC # BLD AUTO: 2.1 X10(3) UL (ref 4–11)

## 2024-02-23 PROCEDURE — 86850 RBC ANTIBODY SCREEN: CPT | Performed by: EMERGENCY MEDICINE

## 2024-02-23 PROCEDURE — 86900 BLOOD TYPING SEROLOGIC ABO: CPT | Performed by: EMERGENCY MEDICINE

## 2024-02-23 PROCEDURE — 36430 TRANSFUSION BLD/BLD COMPNT: CPT

## 2024-02-23 PROCEDURE — 86901 BLOOD TYPING SEROLOGIC RH(D): CPT | Performed by: EMERGENCY MEDICINE

## 2024-02-23 PROCEDURE — 36415 COLL VENOUS BLD VENIPUNCTURE: CPT

## 2024-02-23 PROCEDURE — 85025 COMPLETE CBC W/AUTO DIFF WBC: CPT | Performed by: EMERGENCY MEDICINE

## 2024-02-23 PROCEDURE — 99285 EMERGENCY DEPT VISIT HI MDM: CPT

## 2024-02-23 PROCEDURE — 86920 COMPATIBILITY TEST SPIN: CPT

## 2024-02-23 PROCEDURE — 80048 BASIC METABOLIC PNL TOTAL CA: CPT | Performed by: EMERGENCY MEDICINE

## 2024-02-23 NOTE — ED QUICK NOTES
Rounding Completed  BLOOD INFUSING. PT DENIES COMPLAINTS  EASY WOB NOTED  A/OX4   WATCHING TV  FAMILY MEMBER @ BEDSIDE     Bed is locked and in lowest position. Call light within reach.

## 2024-02-23 NOTE — ED PROVIDER NOTES
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  Patient receiving palliative chemotherapy.  Patient with a history of anemia requiring blood transfusion just earlier this month.  Patient had Neulasta injection 7 days ago.  Patient was to the office just yesterday for hydration.  Labs were drawn.  According to nurses note, patient left accessed for blood transfusion appointment tomorrow.  Hemoglobin 6.8 critical level.  According to previous records hematologic/Oncologic History:  Digestive issues led to EGD and colonoscopy 2/13/2023  Finding of a large gastric mass  Biopsy proven poorly differentiated neuroendocrine carcinoma  Staging scans show lymphadenopathy, both local and distant, and invasion of tumor into pancreas  Chemotherapy with carboplatin and etoposide 3/21/2023-7/6/2023  Carboplatin reduced during cycles 4-6 due to shortage  Final cycle 8/22/2023  Symptomatic progression in November 2023  Topotecan 11/29/2023-    Patient tells me that blood could not be arranged for him yesterday so he was told to come to the emergency department today for transfusion.  Patient reports that he feels a little weak generally.  Also, a little lightheaded at times.  No recent high fever or shaking chills.  No cough.  No vomiting.  No diarrhea.  No urinary symptoms    Objective:   Past Medical History:   Diagnosis Date    Abnormal screening CT of heart 03/31/2005    calcium score 6    Abnormal screening CT of heart 02/09/2010    calcium score of 18    Abnormal screening CT of heart 04/21/2015    calcium score 53    Adenomatous colon polyp 08/27/2014    Allergic rhinitis due to pollen     BPH (benign  prostatic hyperplasia)     Cancer (HCC)     Neuroendocrine CA/Pancreatic    Cardiomyopathy (HCC)     2D Echo 23    COVID 2022    No hospitalization. Harsh coughing    Degeneration of cervical intervertebral disc     Diverticula of colon     Essential hypertension, benign     GOUT     High blood pressure     High cholesterol     History of blood transfusion     No reaction    Hypertrophic cardiomyopathy (HCC)     Personal history of antineoplastic chemotherapy 2023    Last treatment    Pure hypercholesterolemia     Tinnitus, bilateral     Type II or unspecified type diabetes mellitus without mention of complication, not stated as uncontrolled     Visual impairment     Glasses              Past Surgical History:   Procedure Laterality Date    CHOLECYSTECTOMY  1988    COLONOSCOPY  2008    tiny polyps    COLONOSCOPY  2014    Procedure: COLONOSCOPY;  Surgeon: Liang Quevedo MD;  Location:  ENDOSCOPY    COLONOSCOPY N/A 2017    Procedure: COLONOSCOPY;  Surgeon: Liang Quevedo MD;  Location:  ENDOSCOPY    COLONOSCOPY      PERIPHERAL VASCULAR SCREENING HISTORICAL CONV Bilateral 2017    mild carotid narrowing, PAD screen    UPPER GI ENDOSCOPY,EXAM      VASCULAR LAB - [facility] Bilateral 2011    PAD screening normal                Social History     Socioeconomic History    Marital status:      Spouse name: Dayan    Number of children: 2    Years of education: 12   Occupational History    Occupation:      Comment: self employed   Tobacco Use    Smoking status: Former     Packs/day: 1.00     Years: 17.00     Additional pack years: 0.00     Total pack years: 17.00     Types: Cigarettes     Quit date: 1986     Years since quittin.1    Smokeless tobacco: Never   Vaping Use    Vaping Use: Never used   Substance and Sexual Activity    Alcohol use: No     Alcohol/week: 0.0 standard drinks of alcohol    Drug use: No    Sexual activity: Yes      Partners: Female   Other Topics Concern     Service Yes     Comment: [employer]     Blood Transfusions No    Caffeine Concern Yes     Comment: coffee 1 cup a day    Occupational Exposure Yes     Comment: asbestos    Hobby Hazards No    Sleep Concern No    Stress Concern No    Weight Concern Yes    Special Diet No    Back Care No    Exercise Yes     Comment: walk    Bike Helmet No    Seat Belt Yes    Self-Exams No   Social History Narrative    Lives with wife, and son    Enjoys [employer]              Review of Systems    Positive for stated complaint: here for blood transfusion hgb 6.8  Other systems are as noted in HPI.  Constitutional and vital signs reviewed.      All other systems reviewed and negative except as noted above.    Physical Exam     ED Triage Vitals   BP 02/23/24 0941 139/64   Pulse 02/23/24 0941 76   Resp 02/23/24 0941 18   Temp 02/23/24 0941 97.5 °F (36.4 °C)   Temp src 02/23/24 1100 Oral   SpO2 02/23/24 0941 100 %   O2 Device 02/23/24 0941 None (Room air)       Current:/67   Pulse 72   Temp 98.3 °F (36.8 °C) (Oral)   Resp 14   Ht 170.2 cm (5' 7\")   Wt 90.7 kg   SpO2 100%   BMI 31.32 kg/m²         Physical Exam  General: The patient is awake, alert, conversant.   Eyes: sclera white, conjunctiva pale and moist.  Lids and lashes are normal  Lungs: Clear to auscultation bilaterally.  No rhonchi or rales.  Heart: Normal S1 and S2, without murmur.  Distal pulses are strong and symmetric.  Abdomen: Soft, protuberant but nondistended.  Completely nontender  Extremities: calves nonswollen, symmetric  Skin: pale  Neurologic:  Mental status as above.  Patient moves all extremities with good strength and coordination.           ED Course     Labs Reviewed   BASIC METABOLIC PANEL (8) - Abnormal; Notable for the following components:       Result Value    Glucose 187 (*)     All other components within normal limits   CBC W/ DIFFERENTIAL - Abnormal; Notable for the following components:    WBC 2.1  (*)     RBC 2.31 (*)     HGB 7.0 (*)     HCT 21.7 (*)     PLT 28.0 (*)     Neutrophil Absolute Prelim 0.90 (*)     Neutrophil Absolute 0.90 (*)     Lymphocyte Absolute 0.98 (*)     All other components within normal limits   CBC WITH DIFFERENTIAL WITH PLATELET    Narrative:     The following orders were created for panel order CBC With Differential With Platelet.  Procedure                               Abnormality         Status                     ---------                               -----------         ------                     CBC W/ DIFFERENTIAL[[id number]]          Abnormal            Final result                 Please view results for these tests on the individual orders.   SCAN SLIDE   TYPE AND SCREEN    Narrative:     The following orders were created for panel order Type and screen.  Procedure                               Abnormality         Status                     ---------                               -----------         ------                     ABORH (Blood Type)[[id number]]                               Final result               Antibody Screen[[id number]]                                  Final result                 Please view results for these tests on the individual orders.   PREPARE RBC   ABORH (BLOOD TYPE)   ANTIBODY SCREEN             CBC from yesterday showed white count 3.01 with hemoglobin 6.8 and platelet count 83         MDM      Patient presents for blood transfusion.  He had a blood transfusion just earlier this month  He has typical symptoms of anemia.  No infectious complaints    Typed and crossmatched for 1 unit packed red blood sent  I discussed case with Dr. Rivers.  He agrees with plan for transfusion.  No need for saline washed or irradiated blood.  No need for platelet transfusion at this time  If patient remains stable, he can be discharged home after the transfusion and their office can follow-up with him closely    CBC white count 2.1 with hemoglobin 7.0 and platelets  28  Metabolic panel shows normal electrolytes and creatinine.  Blood sugar 187 consistent with a history of diabetes    Patient had no ill effects from the blood transfusion.  Patient discharged to follow-up closely with his hematologist                                       Medical Decision Making      Disposition and Plan     Clinical Impression:  1. Anemia, unspecified type         Disposition:  Discharge  2/23/2024  1:31 pm    Follow-up:  Hermelindo Henning MD  [address] [place] [address]  [address] [place] [address]  [phone]    Call today            Medications Prescribed:  Current Discharge Medication List               A total of 35 minutes of critical care time (exclusive of billable procedures) was administered to manage the patient's critical lab values due to his significant symptomatic anemia.  This involved direct patient intervention, complex decision making, and/or extensive discussions with the patient, family, and clinical staff.

## 2024-02-25 LAB
BLOOD TYPE BARCODE: 6200
UNIT VOLUME: 350 ML

## 2024-03-24 ENCOUNTER — APPOINTMENT (OUTPATIENT)
Dept: GENERAL RADIOLOGY | Facility: HOSPITAL | Age: 74
End: 2024-03-24
Attending: EMERGENCY MEDICINE
Payer: MEDICARE

## 2024-03-24 ENCOUNTER — HOSPITAL ENCOUNTER (INPATIENT)
Facility: HOSPITAL | Age: 74
LOS: 3 days | Discharge: HOME | End: 2024-03-27
Attending: EMERGENCY MEDICINE | Admitting: INTERNAL MEDICINE
Payer: MEDICARE

## 2024-03-24 DIAGNOSIS — K92.2 GASTROINTESTINAL HEMORRHAGE, UNSPECIFIED GASTROINTESTINAL HEMORRHAGE TYPE: Primary | ICD-10-CM

## 2024-03-24 DIAGNOSIS — D69.6 THROMBOCYTOPENIA (HCC): ICD-10-CM

## 2024-03-24 DIAGNOSIS — D64.9 ANEMIA, UNSPECIFIED TYPE: ICD-10-CM

## 2024-03-24 PROBLEM — R73.9 HYPERGLYCEMIA: Status: ACTIVE | Noted: 2024-03-24

## 2024-03-24 PROBLEM — R79.89 AZOTEMIA: Status: ACTIVE | Noted: 2024-03-24

## 2024-03-24 PROBLEM — E87.1 HYPONATREMIA: Status: ACTIVE | Noted: 2024-03-24

## 2024-03-24 LAB
ALBUMIN SERPL-MCNC: 2.9 G/DL (ref 3.4–5)
ALBUMIN/GLOB SERPL: 0.9 {RATIO} (ref 1–2)
ALP LIVER SERPL-CCNC: 60 U/L
ALT SERPL-CCNC: 16 U/L
ANION GAP SERPL CALC-SCNC: 8 MMOL/L (ref 0–18)
ANTIBODY SCREEN: NEGATIVE
APTT PPP: 26.8 SECONDS (ref 23.3–35.6)
AST SERPL-CCNC: 13 U/L (ref 15–37)
BASOPHILS # BLD AUTO: 0 X10(3) UL (ref 0–0.2)
BASOPHILS # BLD: 0 X10(3) UL (ref 0–0.2)
BASOPHILS NFR BLD AUTO: 0 %
BASOPHILS NFR BLD: 0 %
BILIRUB SERPL-MCNC: 0.7 MG/DL (ref 0.1–2)
BUN BLD-MCNC: 32 MG/DL (ref 9–23)
CALCIUM BLD-MCNC: 8.9 MG/DL (ref 8.5–10.1)
CHLORIDE SERPL-SCNC: 101 MMOL/L (ref 98–112)
CO2 SERPL-SCNC: 25 MMOL/L (ref 21–32)
CREAT BLD-MCNC: 1.26 MG/DL
EGFRCR SERPLBLD CKD-EPI 2021: 60 ML/MIN/1.73M2 (ref 60–?)
EOSINOPHIL # BLD AUTO: 0 X10(3) UL (ref 0–0.7)
EOSINOPHIL # BLD: 0 X10(3) UL (ref 0–0.7)
EOSINOPHIL NFR BLD AUTO: 0 %
EOSINOPHIL NFR BLD: 0 %
ERYTHROCYTE [DISTWIDTH] IN BLOOD BY AUTOMATED COUNT: 16.1 %
ERYTHROCYTE [DISTWIDTH] IN BLOOD BY AUTOMATED COUNT: 16.5 %
EST. AVERAGE GLUCOSE BLD GHB EST-MCNC: 171 MG/DL (ref 68–126)
GLOBULIN PLAS-MCNC: 3.4 G/DL (ref 2.8–4.4)
GLUCOSE BLD-MCNC: 152 MG/DL (ref 70–99)
GLUCOSE BLD-MCNC: 202 MG/DL (ref 70–99)
GLUCOSE BLD-MCNC: 217 MG/DL (ref 70–99)
HBA1C MFR BLD: 7.6 % (ref ?–5.7)
HCT VFR BLD AUTO: 17.6 %
HCT VFR BLD AUTO: 20.4 %
HGB BLD-MCNC: 5.8 G/DL
HGB BLD-MCNC: 6.8 G/DL
IMM GRANULOCYTES # BLD AUTO: 0.05 X10(3) UL (ref 0–1)
IMM GRANULOCYTES NFR BLD: 1 %
INR BLD: 1.15 (ref 0.8–1.2)
LYMPHOCYTES # BLD AUTO: 1.94 X10(3) UL (ref 1–4)
LYMPHOCYTES NFR BLD AUTO: 39.3 %
LYMPHOCYTES NFR BLD: 1.82 X10(3) UL (ref 1–4)
LYMPHOCYTES NFR BLD: 33 %
MCH RBC QN AUTO: 30.2 PG (ref 26–34)
MCH RBC QN AUTO: 31 PG (ref 26–34)
MCHC RBC AUTO-ENTMCNC: 33 G/DL (ref 31–37)
MCHC RBC AUTO-ENTMCNC: 33.3 G/DL (ref 31–37)
MCV RBC AUTO: 90.7 FL
MCV RBC AUTO: 94.1 FL
MONOCYTES # BLD AUTO: 0.34 X10(3) UL (ref 0.1–1)
MONOCYTES # BLD: 0.22 X10(3) UL (ref 0.1–1)
MONOCYTES NFR BLD AUTO: 6.9 %
MONOCYTES NFR BLD: 4 %
NEUTROPHILS # BLD AUTO: 2.61 X10 (3) UL (ref 1.5–7.7)
NEUTROPHILS # BLD AUTO: 2.61 X10(3) UL (ref 1.5–7.7)
NEUTROPHILS # BLD AUTO: 3.33 X10 (3) UL (ref 1.5–7.7)
NEUTROPHILS NFR BLD AUTO: 52.8 %
NEUTROPHILS NFR BLD: 60 %
NEUTS BAND NFR BLD: 3 %
NEUTS HYPERSEG # BLD: 3.47 X10(3) UL (ref 1.5–7.7)
OSMOLALITY SERPL CALC.SUM OF ELEC: 291 MOSM/KG (ref 275–295)
PLATELET # BLD AUTO: 14 10(3)UL (ref 150–450)
PLATELET # BLD AUTO: 29 10(3)UL (ref 150–450)
PLATELET MORPHOLOGY: NORMAL
PLATELET MORPHOLOGY: NORMAL
POTASSIUM SERPL-SCNC: 3.9 MMOL/L (ref 3.5–5.1)
PROT SERPL-MCNC: 6.3 G/DL (ref 6.4–8.2)
PROTHROMBIN TIME: 14.7 SECONDS (ref 11.6–14.8)
RBC # BLD AUTO: 1.87 X10(6)UL
RBC # BLD AUTO: 2.25 X10(6)UL
RH BLOOD TYPE: POSITIVE
SARS-COV-2 RNA RESP QL NAA+PROBE: NOT DETECTED
SODIUM SERPL-SCNC: 134 MMOL/L (ref 136–145)
TOTAL CELLS COUNTED BLD: 100
WBC # BLD AUTO: 4.9 X10(3) UL (ref 4–11)
WBC # BLD AUTO: 5.5 X10(3) UL (ref 4–11)

## 2024-03-24 PROCEDURE — 71045 X-RAY EXAM CHEST 1 VIEW: CPT | Performed by: EMERGENCY MEDICINE

## 2024-03-24 PROCEDURE — 96361 HYDRATE IV INFUSION ADD-ON: CPT

## 2024-03-24 PROCEDURE — 30233N1 TRANSFUSION OF NONAUTOLOGOUS RED BLOOD CELLS INTO PERIPHERAL VEIN, PERCUTANEOUS APPROACH: ICD-10-PCS | Performed by: EMERGENCY MEDICINE

## 2024-03-24 PROCEDURE — 86901 BLOOD TYPING SEROLOGIC RH(D): CPT | Performed by: EMERGENCY MEDICINE

## 2024-03-24 PROCEDURE — 85025 COMPLETE CBC W/AUTO DIFF WBC: CPT | Performed by: EMERGENCY MEDICINE

## 2024-03-24 PROCEDURE — 85610 PROTHROMBIN TIME: CPT | Performed by: EMERGENCY MEDICINE

## 2024-03-24 PROCEDURE — 99291 CRITICAL CARE FIRST HOUR: CPT

## 2024-03-24 PROCEDURE — 83036 HEMOGLOBIN GLYCOSYLATED A1C: CPT | Performed by: HOSPITALIST

## 2024-03-24 PROCEDURE — 86900 BLOOD TYPING SEROLOGIC ABO: CPT | Performed by: EMERGENCY MEDICINE

## 2024-03-24 PROCEDURE — 82962 GLUCOSE BLOOD TEST: CPT

## 2024-03-24 PROCEDURE — 86850 RBC ANTIBODY SCREEN: CPT | Performed by: EMERGENCY MEDICINE

## 2024-03-24 PROCEDURE — C9113 INJ PANTOPRAZOLE SODIUM, VIA: HCPCS | Performed by: EMERGENCY MEDICINE

## 2024-03-24 PROCEDURE — 86920 COMPATIBILITY TEST SPIN: CPT

## 2024-03-24 PROCEDURE — 85025 COMPLETE CBC W/AUTO DIFF WBC: CPT | Performed by: INTERNAL MEDICINE

## 2024-03-24 PROCEDURE — 82272 OCCULT BLD FECES 1-3 TESTS: CPT

## 2024-03-24 PROCEDURE — C9113 INJ PANTOPRAZOLE SODIUM, VIA: HCPCS | Performed by: INTERNAL MEDICINE

## 2024-03-24 PROCEDURE — 36430 TRANSFUSION BLD/BLD COMPNT: CPT

## 2024-03-24 PROCEDURE — 30233R1 TRANSFUSION OF NONAUTOLOGOUS PLATELETS INTO PERIPHERAL VEIN, PERCUTANEOUS APPROACH: ICD-10-PCS | Performed by: EMERGENCY MEDICINE

## 2024-03-24 PROCEDURE — 85027 COMPLETE CBC AUTOMATED: CPT | Performed by: EMERGENCY MEDICINE

## 2024-03-24 PROCEDURE — 96374 THER/PROPH/DIAG INJ IV PUSH: CPT

## 2024-03-24 PROCEDURE — 80053 COMPREHEN METABOLIC PANEL: CPT | Performed by: EMERGENCY MEDICINE

## 2024-03-24 PROCEDURE — 85730 THROMBOPLASTIN TIME PARTIAL: CPT | Performed by: EMERGENCY MEDICINE

## 2024-03-24 PROCEDURE — 85007 BL SMEAR W/DIFF WBC COUNT: CPT | Performed by: EMERGENCY MEDICINE

## 2024-03-24 RX ORDER — PANTOPRAZOLE SODIUM 40 MG/1
40 TABLET, DELAYED RELEASE ORAL
Status: DISCONTINUED | OUTPATIENT
Start: 2024-03-24 | End: 2024-03-24

## 2024-03-24 RX ORDER — ENEMA 19; 7 G/133ML; G/133ML
1 ENEMA RECTAL ONCE AS NEEDED
Status: DISCONTINUED | OUTPATIENT
Start: 2024-03-24 | End: 2024-03-27

## 2024-03-24 RX ORDER — MELATONIN
3 NIGHTLY PRN
Status: DISCONTINUED | OUTPATIENT
Start: 2024-03-24 | End: 2024-03-27

## 2024-03-24 RX ORDER — SODIUM CHLORIDE 9 MG/ML
INJECTION, SOLUTION INTRAVENOUS CONTINUOUS
Status: DISCONTINUED | OUTPATIENT
Start: 2024-03-24 | End: 2024-03-26

## 2024-03-24 RX ORDER — BISACODYL 10 MG
10 SUPPOSITORY, RECTAL RECTAL
Status: DISCONTINUED | OUTPATIENT
Start: 2024-03-24 | End: 2024-03-27

## 2024-03-24 RX ORDER — ROSUVASTATIN CALCIUM 10 MG/1
10 TABLET, COATED ORAL NIGHTLY
Status: DISCONTINUED | OUTPATIENT
Start: 2024-03-24 | End: 2024-03-27

## 2024-03-24 RX ORDER — CARVEDILOL 6.25 MG/1
6.25 TABLET ORAL 2 TIMES DAILY WITH MEALS
Status: DISCONTINUED | OUTPATIENT
Start: 2024-03-24 | End: 2024-03-26

## 2024-03-24 RX ORDER — NICOTINE POLACRILEX 4 MG
30 LOZENGE BUCCAL
Status: DISCONTINUED | OUTPATIENT
Start: 2024-03-24 | End: 2024-03-27

## 2024-03-24 RX ORDER — NICOTINE POLACRILEX 4 MG
15 LOZENGE BUCCAL
Status: DISCONTINUED | OUTPATIENT
Start: 2024-03-24 | End: 2024-03-27

## 2024-03-24 RX ORDER — SENNOSIDES 8.6 MG
17.2 TABLET ORAL NIGHTLY PRN
Status: DISCONTINUED | OUTPATIENT
Start: 2024-03-24 | End: 2024-03-27

## 2024-03-24 RX ORDER — ALLOPURINOL 300 MG/1
300 TABLET ORAL DAILY
Status: DISCONTINUED | OUTPATIENT
Start: 2024-03-25 | End: 2024-03-27

## 2024-03-24 RX ORDER — SODIUM CHLORIDE 9 MG/ML
INJECTION, SOLUTION INTRAVENOUS ONCE
Status: COMPLETED | OUTPATIENT
Start: 2024-03-24 | End: 2024-03-25

## 2024-03-24 RX ORDER — METOCLOPRAMIDE HYDROCHLORIDE 5 MG/ML
10 INJECTION INTRAMUSCULAR; INTRAVENOUS EVERY 8 HOURS PRN
Status: DISCONTINUED | OUTPATIENT
Start: 2024-03-24 | End: 2024-03-27

## 2024-03-24 RX ORDER — ONDANSETRON 2 MG/ML
4 INJECTION INTRAMUSCULAR; INTRAVENOUS EVERY 4 HOURS PRN
Status: DISCONTINUED | OUTPATIENT
Start: 2024-03-24 | End: 2024-03-24 | Stop reason: SDUPTHER

## 2024-03-24 RX ORDER — POLYETHYLENE GLYCOL 3350 17 G/17G
17 POWDER, FOR SOLUTION ORAL DAILY PRN
Status: DISCONTINUED | OUTPATIENT
Start: 2024-03-24 | End: 2024-03-27

## 2024-03-24 RX ORDER — DEXTROSE MONOHYDRATE 25 G/50ML
50 INJECTION, SOLUTION INTRAVENOUS
Status: DISCONTINUED | OUTPATIENT
Start: 2024-03-24 | End: 2024-03-27

## 2024-03-24 RX ORDER — SODIUM CHLORIDE, SODIUM LACTATE, POTASSIUM CHLORIDE, CALCIUM CHLORIDE 600; 310; 30; 20 MG/100ML; MG/100ML; MG/100ML; MG/100ML
INJECTION, SOLUTION INTRAVENOUS CONTINUOUS
Status: DISCONTINUED | OUTPATIENT
Start: 2024-03-24 | End: 2024-03-26

## 2024-03-24 RX ORDER — ACETAMINOPHEN 500 MG
500 TABLET ORAL EVERY 4 HOURS PRN
Status: DISCONTINUED | OUTPATIENT
Start: 2024-03-24 | End: 2024-03-27

## 2024-03-24 RX ORDER — SODIUM CHLORIDE 9 MG/ML
INJECTION, SOLUTION INTRAVENOUS CONTINUOUS
Status: ACTIVE | OUTPATIENT
Start: 2024-03-24 | End: 2024-03-24

## 2024-03-24 RX ORDER — ONDANSETRON 2 MG/ML
4 INJECTION INTRAMUSCULAR; INTRAVENOUS EVERY 6 HOURS PRN
Status: DISCONTINUED | OUTPATIENT
Start: 2024-03-24 | End: 2024-03-27

## 2024-03-24 NOTE — PLAN OF CARE
NURSING ADMISSION NOTE      Patient admitted via Cart  Oriented to room.  Safety precautions initiated.  Bed in low position.  Call light in reach.  Admission navigator was completed with patient at bedside.    Patient A/O x4. VSS. 1st of 2 units of blood was infusing upon arrival. Room air. No complaints of pain. Ambulatory. Voids. Clear liquid diet. NPO at midnight for possible scope tomorrow. Medications given per MAR. Safety precautions in place. Call light within reach.

## 2024-03-24 NOTE — H&P
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 Henning of [facility] oncology, diabetes mellitus type 2, hypertension, hyperlipidemia presented with dark stools and weakness.  He reports noticing some very dark brown stools last night.  He is also been feeling very weak and tired and wiped out.  He has a history of nosebleeds and he knows that his weakness was due to anemia.  Given the ongoing symptoms he came to the ER. Of note, last chemotherapy was on 3/14.  In the ER his initial BP was 94/61.  Labs significant for sodium of 134, hemoglobin of 5.8 and platelets of 14.  He was ordered 2 units of blood.  GI was consulted for positive stool guaiac.  Oncology also consulted.  Admitted for further evaluation and treatment.    Past Medical History:   Diagnosis Date    Abnormal screening CT of heart 03/31/2005    calcium score 6    Abnormal screening CT of heart 02/09/2010    calcium score of 18    Abnormal screening CT of heart 04/21/2015    calcium score 53    Adenomatous colon polyp 08/27/2014    Allergic rhinitis due to pollen     BPH (benign prostatic hyperplasia)     Cancer (HCC)     Neuroendocrine CA/Pancreatic    Cardiomyopathy (HCC)     2D Echo 9/22/23    COVID 08/2022    No hospitalization. Harsh coughing    Degeneration of cervical intervertebral disc     Diverticula of colon     Essential hypertension, benign     GOUT     High blood pressure     High cholesterol     History of blood transfusion     No reaction    Hypertrophic cardiomyopathy (HCC)     Personal history of antineoplastic chemotherapy 12/05/2023    Last treatment    Pure hypercholesterolemia     Tinnitus, bilateral     Type II or unspecified type diabetes mellitus  without mention of complication, not stated as uncontrolled     Visual impairment     Glasses      Past Surgical History:   Procedure Laterality Date    CHOLECYSTECTOMY  1988    COLONOSCOPY  2008    tiny polyps    COLONOSCOPY  2014    Procedure: COLONOSCOPY;  Surgeon: Liang Quevedo MD;  Location:  ENDOSCOPY    COLONOSCOPY N/A 2017    Procedure: COLONOSCOPY;  Surgeon: Liang Quevedo MD;  Location:  ENDOSCOPY    COLONOSCOPY      PERIPHERAL VASCULAR SCREENING HISTORICAL CONV Bilateral 2017    mild carotid narrowing, PAD screen    UPPER GI ENDOSCOPY,EXAM      VASCULAR LAB - [facility] Bilateral 2011    PAD screening normal        ALL:  Allergies   Allergen Reactions    Amlodipine SWELLING     Ankle swelling on amlodipine.        No current outpatient medications on file.       Social History     Tobacco Use    Smoking status: Former     Packs/day: 1.00     Years: 17.00     Additional pack years: 0.00     Total pack years: 17.00     Types: Cigarettes     Quit date: 1986     Years since quittin.2    Smokeless tobacco: Never   Substance Use Topics    Alcohol use: No     Alcohol/week: 0.0 standard drinks of alcohol        Fam Hx  Family History   Problem Relation Age of Onset    Arthritis Father         TKA    Hypertension Father     Diabetes Father     Obesity Father     Cancer Mother 70        colon, ?uterine    Genito-Urinary Disorder Mother         hysterectomy    Substance Abuse Son     Asthma Son     High Cholesterol Son     Gastro-Intestinal Disorder Brother         colon polyps, GERD    Hypertension Brother     Neurological Disorder Daughter         MS    Diabetes Daughter     Hypertension Sister     Diabetes Sister     Lipids Sister        Review of Systems  Comprehensive ROS reviewed and negative except for what is stated in HPI.      OBJECTIVE:  /60 (BP Location: Left arm)   Pulse 80   Temp 97.9 °F (36.6 °C) (Oral)   Resp 21   Ht 5' 8\" (1.727 m)   Wt  193 lb 14.4 oz (88 kg)   SpO2 100%   BMI 29.48 kg/m²   General:  Alert, no distress, appears stated age.    Head:  Normocephalic, without obvious abnormality, atraumatic.   Eyes:  Sclera anicteric, No conjunctival pallor, EOMs intact.    Nose: Nares normal. Septum midline. Mucosa normal. No drainage.   Throat: Lips, mucosa, and tongue normal. Teeth and gums normal.   Neck: Supple, symmetrical, trachea midline, no cervical or supraclavicular lymph adenopathy, thyroid: no enlargment/tenderness/nodules appreciated   Lungs:   Clear to auscultation bilaterally. Normal effort   Chest wall:  No tenderness or deformity.   Heart:  Regular rate and rhythm, S1, S2 normal, no murmur, rub or gallop appreciated   Abdomen:   Soft, non-tender. Bowel sounds normal. No masses,  No organomegaly. Non distended   Extremities: Extremities normal, atraumatic, no cyanosis or edema.   Skin: Skin color, texture, turgor normal. No rashes or lesions.    Neurologic: Normal strength, no focal deficit appreciated     Data Review:    LABS:   Lab Results   Component Value Date    WBC 5.5 03/24/2024    HGB 5.8 03/24/2024    HCT 17.6 03/24/2024    PLT 14.0 03/24/2024    CREATSERUM 1.26 03/24/2024    BUN 32 03/24/2024     03/24/2024    K 3.9 03/24/2024     03/24/2024    CO2 25.0 03/24/2024     03/24/2024    CA 8.9 03/24/2024    ALB 2.9 03/24/2024    ALKPHO 60 03/24/2024    BILT 0.7 03/24/2024    TP 6.3 03/24/2024    AST 13 03/24/2024    ALT 16 03/24/2024    PTT 26.8 03/24/2024    INR 1.15 03/24/2024    PTP 14.7 03/24/2024    PGLU 152 03/24/2024       CXR: All imaging personally reviewed.      Radiology: XR CHEST AP PORTABLE  (CPT=71045)    Result Date: 3/24/2024  PROCEDURE:  XR CHEST AP PORTABLE  (CPT=71045)  TECHNIQUE:  AP chest radiograph was obtained.  COMPARISON:  None.  INDICATIONS:  cancer patient, black stool, weak  PATIENT STATED HISTORY: (As transcribed by Technologist)  Patient states his hemoglobin has been low values  for a few days. He states this is a recurring issue. Patient is currently undergoing chemotherapy.             CONCLUSION:  Hyperinflation both lungs consistent with COPD.  Slight infiltrate versus atelectasis in the lung bases.  Port-A-Cath tip SVC.  No pneumothorax or pleural effusion.   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 Henning of [facility] oncology, diabetes mellitus type 2, hypertension, hyperlipidemia presented with dark stools and weakness.    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   [facility] hospitalist to continue to follow patient while in house      Hemant Davis MD  [facility]  Hospitalist  Message over [employer]  Pager: [phone]        **Certification      PHYSICIAN Certification of Need for Inpatient Hospitalization - Initial Certification    Patient will require inpatient services that will reasonably be expected to span two midnight's based on the clinical documentation in H+P.   Based on patients current state of illness, I anticipate  that, after discharge, patient will require TBD.

## 2024-03-24 NOTE — ED PROVIDER NOTES
Patient Seen in: [facility] Emergency Department      History     Chief Complaint   Patient presents with    GI Bleeding     Stated Complaint: cancer patient, black stool, weak    Subjective:   HPI    Patient is a 73-year-old male who has history of neuroendocrine cancer of pancreas stomach.  Patient states he does have a history of GI bleeding a year ago was scoped by billy CHUNG.  Patient had previous transfusions in February.  Patient is currently undergoing chemo.  Patient states he is an anxious increasingly weak and fatigued for the past week or so patient states he did have some URI symptoms cough nasal congestion for last week.  Patient denies any shortness of breath or abdominal pain.  Patient states he did have a dark brown stool last night and was concerned about GI bleeding.  Patient states he did have some slight nosebleeds in the past no current bleeding now.  Remainder of review of systems negative.    Objective:   Past Medical History:   Diagnosis Date    Abnormal screening CT of heart 03/31/2005    calcium score 6    Abnormal screening CT of heart 02/09/2010    calcium score of 18    Abnormal screening CT of heart 04/21/2015    calcium score 53    Adenomatous colon polyp 08/27/2014    Allergic rhinitis due to pollen     BPH (benign prostatic hyperplasia)     Cancer (HCC)     Neuroendocrine CA/Pancreatic    Cardiomyopathy (HCC)     2D Echo 9/22/23    COVID 08/2022    No hospitalization. Harsh coughing    Degeneration of cervical intervertebral disc     Diverticula of colon     Essential hypertension, benign     GOUT     High blood pressure     High cholesterol     History of blood transfusion     No reaction    Hypertrophic cardiomyopathy (HCC)     Personal history of antineoplastic chemotherapy 12/05/2023    Last treatment    Pure hypercholesterolemia     Tinnitus, bilateral     Type II or unspecified type diabetes mellitus without mention of complication, not stated as uncontrolled     Visual  impairment     Glasses              Past Surgical History:   Procedure Laterality Date    CHOLECYSTECTOMY  1988    COLONOSCOPY  2008    tiny polyps    COLONOSCOPY  2014    Procedure: COLONOSCOPY;  Surgeon: Liang Quevedo MD;  Location:  ENDOSCOPY    COLONOSCOPY N/A 2017    Procedure: COLONOSCOPY;  Surgeon: Liang Quevedo MD;  Location:  ENDOSCOPY    COLONOSCOPY      PERIPHERAL VASCULAR SCREENING HISTORICAL CONV Bilateral 2017    mild carotid narrowing, PAD screen    UPPER GI ENDOSCOPY,EXAM      VASCULAR LAB - [facility] Bilateral 2011    PAD screening normal                Social History     Socioeconomic History    Marital status:      Spouse name: Dayan    Number of children: 2    Years of education: 12   Occupational History    Occupation:      Comment: self employed   Tobacco Use    Smoking status: Former     Packs/day: 1.00     Years: 17.00     Additional pack years: 0.00     Total pack years: 17.00     Types: Cigarettes     Quit date: 1986     Years since quittin.2    Smokeless tobacco: Never   Vaping Use    Vaping Use: Never used   Substance and Sexual Activity    Alcohol use: No     Alcohol/week: 0.0 standard drinks of alcohol    Drug use: No    Sexual activity: Yes     Partners: Female   Other Topics Concern     Service Yes     Comment: [employer]     Blood Transfusions No    Caffeine Concern Yes     Comment: coffee 1 cup a day    Occupational Exposure Yes     Comment: asbestos    Hobby Hazards No    Sleep Concern No    Stress Concern No    Weight Concern Yes    Special Diet No    Back Care No    Exercise Yes     Comment: walk    Bike Helmet No    Seat Belt Yes    Self-Exams No   Social History Narrative    Lives with wife, and son    Enjoys [employer]              Review of Systems    Positive for stated complaint: cancer patient, black stool, weak  Other systems are as noted in HPI.  Constitutional and vital signs reviewed.      All  other systems reviewed and negative except as noted above.    Physical Exam     ED Triage Vitals [03/24/24 1307]   BP 94/61   Pulse 92   Resp 18   Temp 98 °F (36.7 °C)   Temp src Oral   SpO2 99 %   O2 Device None (Room air)       Current:/56   Pulse 75   Temp 98.2 °F (36.8 °C) (Temporal)   Resp 12   Ht 172.7 cm (5' 8\")   Wt 88.5 kg   SpO2 100%   BMI 29.65 kg/m²         Physical Exam  GENERAL: Patient resting on the cart in no acute distress.  HEENT: Extraocular muscles intact, pupils equal round reactive to light.  Mouth normal, neck supple, no meningismus.  LUNGS: Lungs clear to auscultation bilaterally.  CARDIOVASCULAR: + S1-S2, regular rate and rhythm, no murmurs.  BACK: No CVA tenderness, no midline bony tenderness.  ABDOMEN: + Bowel sounds, soft, nontender, nondistended.  No rebound, no guarding, no hepatosplenomegaly.  Rectal exam strongly Hemoccult positive dark brown stool  EXTREMITIES: Full range of motion, no tenderness, good capillary refill.  SKIN: No rash, good turgor.  NEURO: Patient answers questions appropriately.  No focal deficits appreciated.           ED Course     Labs Reviewed   COMP METABOLIC PANEL (14) - Abnormal; Notable for the following components:       Result Value    Glucose 217 (*)     Sodium 134 (*)     BUN 32 (*)     AST 13 (*)     Total Protein 6.3 (*)     Albumin 2.9 (*)     A/G Ratio 0.9 (*)     All other components within normal limits   MANUAL DIFFERENTIAL - Abnormal; Notable for the following components:    RBC Morphology See morphology below (*)     Hypochromia 2+ (*)     All other components within normal limits   CBC W/ DIFFERENTIAL - Abnormal; Notable for the following components:    RBC 1.87 (*)     HGB 5.8 (*)     HCT 17.6 (*)     PLT 14.0 (*)     All other components within normal limits   PROTHROMBIN TIME (PT) - Normal   PTT, ACTIVATED - Normal   RAPID SARS-COV-2 BY PCR - Normal   CBC WITH DIFFERENTIAL WITH PLATELET    Narrative:     The following orders  were created for panel order CBC With Differential With Platelet.  Procedure                               Abnormality         Status                     ---------                               -----------         ------                     CBC W/ DIFFERENTIAL[[id number]]          Abnormal            Final result                 Please view results for these tests on the individual orders.   SCAN SLIDE   TYPE AND SCREEN    Narrative:     The following orders were created for panel order Type and screen.  Procedure                               Abnormality         Status                     ---------                               -----------         ------                     ABORH (Blood Type)[[id number]]                               Final result               Antibody Screen[[id number]]                                  Final result                 Please view results for these tests on the individual orders.   ABORH (BLOOD TYPE)   ANTIBODY SCREEN   PREPARE RBC   PREPARE PLATELETS   RAINBOW DRAW LAVENDER   RAINBOW DRAW LIGHT GREEN   RAINBOW DRAW BLUE   RAINBOW DRAW GOLD             Chest x-rayHyperinflation both lungs consistent with COPD.      Slight infiltrate versus atelectasis in the lung bases.      Port-A-Cath tip SVC.      No pneumothorax or pleural effusion.   Independent review by myself, no pneumothorax         MDM      Patient given IV fluids.  Patient given Protonix.  Patient hemoglobin was 5.8.  Patient did have 2 units packed red cells ordered for transfusion.  Patient platelets were low at 14.  Patient did have platelets ordered for transfusion.  I did speak with GI.  I did speak with oncology.  I did speak with hospitalist.  Patient will be admitted for further evaluation.  I did consider GI bleed, anemia, thrombocytopenia, COVID.  Patient was stable during his stay in the emergency department.    A total of 45 minutes of critical care time (exclusive of billable procedures) was administered to manage  the patient's critical lab values due to his anemia and thrombocytopenia..  This involved direct patient intervention, complex decision making, and/or extensive discussions with the patient, family, and clinical staff.      Admission disposition: 3/24/2024  3:08 PM                                        Medical Decision Making      Disposition and Plan     Clinical Impression:  1. Gastrointestinal hemorrhage, unspecified gastrointestinal hemorrhage type    2. Anemia, unspecified type    3. Thrombocytopenia (HCC)         Disposition:  Admit  3/24/2024  3:08 pm    Follow-up:  No follow-up provider specified.        Medications Prescribed:  Current Discharge Medication List                            Hospital Problems       Present on Admission  Date Reviewed: 3/21/2022            ICD-10-CM Noted POA    * (Principal) Gastrointestinal hemorrhage, unspecified gastrointestinal hemorrhage type K92.2 3/24/2024 Unknown    Anemia D64.9 3/24/2024 Yes    Azotemia R79.89 3/24/2024 Yes    Hyperglycemia R73.9 3/24/2024 Yes    Hyponatremia E87.1 3/24/2024 Yes

## 2024-03-24 NOTE — ED QUICK NOTES
Orders for admission, patient is aware of plan and ready to go upstairs. Any questions, please call ED RN Willem at extension [phone].     Patient Covid vaccination status: Fully vaccinated     COVID Test Ordered in ED: Rapid SARS-CoV-2 by PCR    COVID Suspicion at Admission: N/A    Running Infusions:    sodium chloride 125 mL/hr at 03/24/24 1431        Mental Status/LOC at time of transport: A&Ox4    Other pertinent information: platelets and blood given/going. Last chemo 3/15  CIWA score: N/A   NIH score:  N/A

## 2024-03-24 NOTE — CONSULTS
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 Followed by [facility] (Dr. Henning), s/p chemotx w/ concerns for symptomatic progression 11/2023 (further chemotx at that point).    Last Oncology visit was 3/1, significant impact to plts likely d/t chemo at that time.    Presenting to ED w/ incr weakness + fatigue + dark stools over the last 1-2 days (not as black as prior to initial EGD diagnosis, but definite color change in the last 24hrs). In ED, CBC w/ hgb 5.8 + plt 13K (likely d/t recent chemo, recurrent issue, typically rebounds). HD stable at this time, GI consulted for further mgmt.    Resting in bed at time of my visit, watching TV w/ wife. No abd pain or other worrisome localizing sxs at this time. No further GIB noted since transfer to floor.    PROBLEM LIST:     Patient Active Problem List   Diagnosis    Essential hypertension, benign    Pure hypercholesterolemia    Hyperuricemia    Allergic rhinitis due to pollen    Sensorineural hearing loss, bilateral    Diabetes mellitus type II, non insulin dependent (HCC)    Hypertrophic cardiomyopathy (HCC)    Nasopalatine cyst    Stage 3a chronic kidney disease (HCC)    Rotator cuff tendinitis, right    Hypercholesterolemia with hypertriglyceridemia    BMI 40.0-44.9, adult (HCC)    Gastric mass    Rectal bleeding    Leukocytosis, unspecified type    Anemia, unspecified type    Hemoglobin drop    MDS (myelodysplastic syndrome) (HCC)    Hyponatremia    Anemia    Azotemia    Hyperglycemia    Gastrointestinal hemorrhage, unspecified gastrointestinal hemorrhage type       PATIENT HISTORY:     Past  Medical History:   Diagnosis Date    Abnormal screening CT of heart 03/31/2005    calcium score 6    Abnormal screening CT of heart 02/09/2010    calcium score of 18    Abnormal screening CT of heart 04/21/2015    calcium score 53    Adenomatous colon polyp 08/27/2014    Allergic rhinitis due to pollen     BPH (benign prostatic hyperplasia)     Cancer (HCC)     Neuroendocrine CA/Pancreatic    Cardiomyopathy (HCC)     2D Echo 9/22/23    COVID 08/2022    No hospitalization. Harsh coughing    Degeneration of cervical intervertebral disc     Diverticula of colon     Essential hypertension, benign     GOUT     High blood pressure     High cholesterol     History of blood transfusion     No reaction    Hypertrophic cardiomyopathy (HCC)     Personal history of antineoplastic chemotherapy 12/05/2023    Last treatment    Pure hypercholesterolemia     Tinnitus, bilateral     Type II or unspecified type diabetes mellitus without mention of complication, not stated as uncontrolled     Visual impairment     Glasses      Past Surgical History:   Procedure Laterality Date    CHOLECYSTECTOMY  06/21/1988    COLONOSCOPY  04/21/2008    tiny polyps    COLONOSCOPY  08/27/2014    Procedure: COLONOSCOPY;  Surgeon: Liang Quevedo MD;  Location:  ENDOSCOPY    COLONOSCOPY N/A 09/20/2017    Procedure: COLONOSCOPY;  Surgeon: Liang Quevedo MD;  Location:  ENDOSCOPY    COLONOSCOPY      PERIPHERAL VASCULAR SCREENING HISTORICAL CONV Bilateral 05/22/2017    mild carotid narrowing, PAD screen    UPPER GI ENDOSCOPY,EXAM      VASCULAR LAB - [facility] Bilateral 11/01/2011    PAD screening normal      Family History   Problem Relation Age of Onset    Arthritis Father         TKA    Hypertension Father     Diabetes Father     Obesity Father     Cancer Mother 70        colon, ?uterine    Genito-Urinary Disorder Mother         hysterectomy    Substance Abuse Son     Asthma Son     High Cholesterol Son     Gastro-Intestinal Disorder Brother          colon polyps, GERD    Hypertension Brother     Neurological Disorder Daughter         MS    Diabetes Daughter     Hypertension Sister     Diabetes Sister     Lipids Sister       Social History     Socioeconomic History    Marital status:      Spouse name: Dayan    Number of children: 2    Years of education: 12   Occupational History    Occupation:      Comment: self employed   Tobacco Use    Smoking status: Former     Packs/day: 1.00     Years: 17.00     Additional pack years: 0.00     Total pack years: 17.00     Types: Cigarettes     Quit date: 1986     Years since quittin.2    Smokeless tobacco: Never   Vaping Use    Vaping Use: Never used   Substance and Sexual Activity    Alcohol use: No     Alcohol/week: 0.0 standard drinks of alcohol    Drug use: No    Sexual activity: Yes     Partners: Female   Other Topics Concern     Service Yes     Comment: [employer]     Blood Transfusions No    Caffeine Concern Yes     Comment: coffee 1 cup a day    Occupational Exposure Yes     Comment: asbestos    Hobby Hazards No    Sleep Concern No    Stress Concern No    Weight Concern Yes    Special Diet No    Back Care No    Exercise Yes     Comment: walk    Bike Helmet No    Seat Belt Yes    Self-Exams No   Social History Narrative    Lives with wife, and son    Enjoys [employer]        Allergies;  Allergies   Allergen Reactions    Amlodipine SWELLING     Ankle swelling on amlodipine.        Medications:    Current Facility-Administered Medications:     [COMPLETED] sodium chloride 0.9 % IV bolus 500 mL, 500 mL, Intravenous, Once **FOLLOWED BY** sodium chloride 0.9% infusion, , Intravenous, Continuous     REVIEW OF SYSTEMS:   10pt ROS performed and o/w negative, see HPI for pertinent details.      EXAM:   BP 98/56   Pulse 75   Temp 98.1 °F (36.7 °C) (Temporal)   Resp 11   Ht 5' 8\" (1.727 m)   Wt 195 lb (88.5 kg)   SpO2 100%   BMI 29.65 kg/m²  Body mass index is 29.65 kg/m².  Gen: cooperative,  pleasant, not diaphoretic, nad   HEENT: ncat, normal neck ROM, normal op w/o ulcer/exudate, anicteric, mmm   Resp/Chest: normal respiratory effort, not dyspneic, no incr WOB/cough  CV: no reported palpitations or syncope  Abd: nt, nd, no clinical features suggestive of peritoneal s/s noted  Ext: no c/c/e   Skin: warm, perfused, no jaundice, +pallor  Neuro: aaox3, grossly intact, no asterixis noted, normal speech       LAB/IMAGING RESULTS:     Lab Results   Component Value Date    WBC 5.5 03/24/2024    HGB 5.8 03/24/2024    HCT 17.6 03/24/2024    PLT 14.0 03/24/2024     [unfilled]  Lab Results   Component Value Date    WBC 5.5 03/24/2024    HGB 5.8 03/24/2024    HCT 17.6 03/24/2024    PLT 14.0 03/24/2024    CREATSERUM 1.26 03/24/2024    BUN 32 03/24/2024     03/24/2024    K 3.9 03/24/2024     03/24/2024    CO2 25.0 03/24/2024     03/24/2024    CA 8.9 03/24/2024    ALB 2.9 03/24/2024    ALKPHO 60 03/24/2024    BILT 0.7 03/24/2024    TP 6.3 03/24/2024    AST 13 03/24/2024    ALT 16 03/24/2024    PTT 26.8 03/24/2024    INR 1.15 03/24/2024    PTP 14.7 03/24/2024     Surg Path 2/27/23  Final Diagnosis:     EUS-guided fine needle aspiration, pancreatic body mass, direct smears and cell block preparation:   -Adequate for evaluation.  -POSITIVE for malignant cells.  -Consistent with poorly differentiated neuroendocrine carcinoma.  See comment.        Electronically signed by Estefania Braxton MD on 3/1/2023 at 1620        Final Diagnosis Comment      The cellblock preparation is scantly cellular but shows tumor cells with positive staining for synaptophysin and CDX2 with weak focal staining for cytokeratin 7.  The histology and immunohistochemical profile of the pancreatic body mass tumor is similar to that of the reported gastric mass biopsy from [facility], case [id number], which reportedly showed a poorly differentiated neuroendocrine carcinoma.       Staging Contrast CT CAP 4/2023  CONCLUSION:    1. Mass in  the gastric cardia is compatible with the patient's primary neoplasm.   2. Metastatic disease is noted involving a left supraclavicular lymph node, mediastinal lymph node, retroperitoneal lymph nodes, and the pancreas.   3. Mild ground-glass opacity in the left lower lobe may represent atelectasis versus early infiltrate.   4. Enlarged prostate.  Correlate with PSA.     ASSESSMENT AND PLAN:   #Anemia + melena in setting of known invasive gastric NET, concern for tumor-related bleeding    Correct hematologic parameters, Hgb >7 + plt >50K preferably.  Tentatively plan diag MAC EGD tomorrow if o/w stable. If this truly is tumor-related bleeding, durably-effective endoscopic options are limited (typically only temporizing or not effective, even w/ Hemospray). Hopefully will respond to PRBC + plts transfusion in the meantime.  CLD for now (no reds), NPO after MN.  Protonix 40mg IV BID for now, initial dose given in ED (will receive 2nd dose this evening).  Will continue to follow w/ you.  Case discussed w/ pt + family at bedside this evening.  Case discussed w/ RN this evening.    The patient indicates understanding of these issues and agrees to the plan.      A total of 80 minutes was spent in direct patient care + assessment, chart review, and care coordination today.    Rafa Byrd MD  Department of Gastroenterology  [facility]  3/24/2024  3:29 PM

## 2024-03-24 NOTE — ED INITIAL ASSESSMENT (HPI)
Patient arrived to the ED from home with c/o GI bleed. Patient states he is very weak and fatigued. Noticed dark stools. Patient states he has stomach and pancreatic cancer. Last chemo was 2 weeks ago. States he also woke up with a cold a few days ago. Productive cough with yellow phlegm, SOB, abdominal pain. Denies fevers. AAOx4.

## 2024-03-25 ENCOUNTER — ANESTHESIA EVENT (OUTPATIENT)
Dept: ENDOSCOPY | Facility: HOSPITAL | Age: 74
End: 2024-03-25
Payer: MEDICARE

## 2024-03-25 ENCOUNTER — ANESTHESIA (OUTPATIENT)
Dept: ENDOSCOPY | Facility: HOSPITAL | Age: 74
End: 2024-03-25
Payer: MEDICARE

## 2024-03-25 LAB
ALBUMIN SERPL-MCNC: 2.9 G/DL (ref 3.4–5)
ALBUMIN/GLOB SERPL: 1 {RATIO} (ref 1–2)
ALP LIVER SERPL-CCNC: 54 U/L
ALT SERPL-CCNC: 14 U/L
ANION GAP SERPL CALC-SCNC: 4 MMOL/L (ref 0–18)
AST SERPL-CCNC: 10 U/L (ref 15–37)
BASOPHILS # BLD AUTO: 0.01 X10(3) UL (ref 0–0.2)
BASOPHILS NFR BLD AUTO: 0.3 %
BILIRUB SERPL-MCNC: 0.7 MG/DL (ref 0.1–2)
BLOOD TYPE BARCODE: 5100
BUN BLD-MCNC: 29 MG/DL (ref 9–23)
CALCIUM BLD-MCNC: 8.9 MG/DL (ref 8.5–10.1)
CHLORIDE SERPL-SCNC: 104 MMOL/L (ref 98–112)
CO2 SERPL-SCNC: 28 MMOL/L (ref 21–32)
CREAT BLD-MCNC: 1.15 MG/DL
EGFRCR SERPLBLD CKD-EPI 2021: 67 ML/MIN/1.73M2 (ref 60–?)
EOSINOPHIL # BLD AUTO: 0 X10(3) UL (ref 0–0.7)
EOSINOPHIL NFR BLD AUTO: 0 %
ERYTHROCYTE [DISTWIDTH] IN BLOOD BY AUTOMATED COUNT: 15.9 %
GLOBULIN PLAS-MCNC: 3 G/DL (ref 2.8–4.4)
GLUCOSE BLD-MCNC: 159 MG/DL (ref 70–99)
GLUCOSE BLD-MCNC: 167 MG/DL (ref 70–99)
GLUCOSE BLD-MCNC: 173 MG/DL (ref 70–99)
GLUCOSE BLD-MCNC: 182 MG/DL (ref 70–99)
HCT VFR BLD AUTO: 20.1 %
HGB BLD-MCNC: 7.1 G/DL
IMM GRANULOCYTES # BLD AUTO: 0.03 X10(3) UL (ref 0–1)
IMM GRANULOCYTES NFR BLD: 0.8 %
LYMPHOCYTES # BLD AUTO: 1.2 X10(3) UL (ref 1–4)
LYMPHOCYTES NFR BLD AUTO: 31.9 %
MAGNESIUM SERPL-MCNC: 1.3 MG/DL (ref 1.6–2.6)
MCH RBC QN AUTO: 31.1 PG (ref 26–34)
MCHC RBC AUTO-ENTMCNC: 35.3 G/DL (ref 31–37)
MCV RBC AUTO: 88.2 FL
MONOCYTES # BLD AUTO: 0.33 X10(3) UL (ref 0.1–1)
MONOCYTES NFR BLD AUTO: 8.8 %
NEUTROPHILS # BLD AUTO: 2.19 X10 (3) UL (ref 1.5–7.7)
NEUTROPHILS # BLD AUTO: 2.19 X10(3) UL (ref 1.5–7.7)
NEUTROPHILS NFR BLD AUTO: 58.2 %
OSMOLALITY SERPL CALC.SUM OF ELEC: 292 MOSM/KG (ref 275–295)
PLATELET # BLD AUTO: 20 10(3)UL (ref 150–450)
POTASSIUM SERPL-SCNC: 3.7 MMOL/L (ref 3.5–5.1)
PROT SERPL-MCNC: 5.9 G/DL (ref 6.4–8.2)
RBC # BLD AUTO: 2.28 X10(6)UL
SODIUM SERPL-SCNC: 136 MMOL/L (ref 136–145)
UNIT VOLUME: 195 ML
WBC # BLD AUTO: 3.8 X10(3) UL (ref 4–11)

## 2024-03-25 PROCEDURE — 80053 COMPREHEN METABOLIC PANEL: CPT | Performed by: HOSPITALIST

## 2024-03-25 PROCEDURE — 0W3P8ZZ CONTROL BLEEDING IN GASTROINTESTINAL TRACT, VIA NATURAL OR ARTIFICIAL OPENING ENDOSCOPIC: ICD-10-PCS | Performed by: INTERNAL MEDICINE

## 2024-03-25 PROCEDURE — 82962 GLUCOSE BLOOD TEST: CPT

## 2024-03-25 PROCEDURE — 83735 ASSAY OF MAGNESIUM: CPT | Performed by: HOSPITALIST

## 2024-03-25 PROCEDURE — 36430 TRANSFUSION BLD/BLD COMPNT: CPT

## 2024-03-25 PROCEDURE — C9113 INJ PANTOPRAZOLE SODIUM, VIA: HCPCS | Performed by: INTERNAL MEDICINE

## 2024-03-25 PROCEDURE — 85025 COMPLETE CBC W/AUTO DIFF WBC: CPT | Performed by: HOSPITALIST

## 2024-03-25 PROCEDURE — 97161 PT EVAL LOW COMPLEX 20 MIN: CPT

## 2024-03-25 RX ORDER — LIDOCAINE HYDROCHLORIDE 10 MG/ML
INJECTION, SOLUTION EPIDURAL; INFILTRATION; INTRACAUDAL; PERINEURAL AS NEEDED
Status: DISCONTINUED | OUTPATIENT
Start: 2024-03-25 | End: 2024-03-25 | Stop reason: SURG

## 2024-03-25 RX ORDER — SODIUM CHLORIDE 9 MG/ML
INJECTION, SOLUTION INTRAVENOUS ONCE
Status: COMPLETED | OUTPATIENT
Start: 2024-03-25 | End: 2024-03-25

## 2024-03-25 RX ORDER — SODIUM CHLORIDE, SODIUM LACTATE, POTASSIUM CHLORIDE, CALCIUM CHLORIDE 600; 310; 30; 20 MG/100ML; MG/100ML; MG/100ML; MG/100ML
INJECTION, SOLUTION INTRAVENOUS CONTINUOUS
Status: DISCONTINUED | OUTPATIENT
Start: 2024-03-25 | End: 2024-03-26

## 2024-03-25 RX ORDER — NALOXONE HYDROCHLORIDE 0.4 MG/ML
0.08 INJECTION, SOLUTION INTRAMUSCULAR; INTRAVENOUS; SUBCUTANEOUS ONCE AS NEEDED
Status: DISCONTINUED | OUTPATIENT
Start: 2024-03-25 | End: 2024-03-25 | Stop reason: HOSPADM

## 2024-03-25 RX ADMIN — LIDOCAINE HYDROCHLORIDE 120 MG: 10 INJECTION, SOLUTION EPIDURAL; INFILTRATION; INTRACAUDAL; PERINEURAL at 13:52:00

## 2024-03-25 NOTE — PHYSICAL THERAPY NOTE
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 Henning of [facility] oncology, diabetes mellitus type 2, hypertension, hyperlipidemia  Reason for Therapy: Mobility Dysfunction and Discharge Planning    PHYSICAL THERAPY ASSESSMENT   Patient is a 73 year old male admitted 3/24/2024 Presented with dark stools and weakness, plan for EGD.   Patient is currently functioning at baseline with bed mobility, transfers, gait, and stair negotiation. Prior to admission, patient's baseline is indep.     PLAN  Patient has been evaluated and presents with no skilled Physical Therapy needs at this time.  Patient discharged from Physical Therapy services.  Please re-order if a new functional limitation presents during this admission.    GOALS  Patient was able to achieve the following goals ...    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\"       OBJECTIVE  Precautions: None  Fall Risk: Standard fall risk    WEIGHT BEARING RESTRICTION  Weight Bearing Restriction: None                PAIN ASSESSMENT  Ratin          COGNITION  Overall Cognitive Status:  WFL - within functional limits    RANGE OF MOTION AND STRENGTH ASSESSMENT  Upper extremity ROM and strength are within functional limits    Lower extremity ROM  is within functional limits     Lower extremity strength is within functional limits       BALANCE  Static Sitting: Good  Dynamic Sitting: Good  Static Standing: Good  Dynamic Standing: Good    ADDITIONAL TESTS                                    ACTIVITY TOLERANCE                         O2 WALK       NEUROLOGICAL FINDINGS                        AM-PAC '6-Clicks' INPATIENT SHORT FORM - BASIC MOBILITY  How much difficulty does the patient currently have...  Patient Difficulty: Turning over in bed (including adjusting bedclothes, sheets and blankets)?: None   Patient Difficulty: Sitting down on and standing up from a chair with arms (e.g., wheelchair, bedside commode, etc.): None   Patient Difficulty: Moving from lying on back to sitting on the side of the bed?: None   How much help from another person does the patient currently need...   Help from Another: Moving to and from a bed to a chair (including a wheelchair)?: None   Help from Another: Need to walk in hospital room?: None   Help from Another: Climbing 3-5 steps with a railing?: None       AM-PAC Score:  Raw Score: 24   Approx Degree of Impairment: 0%   Standardized Score (AM-PAC Scale): 61.14   CMS Modifier (G-Code): CH    FUNCTIONAL ABILITY STATUS  Gait Assessment   Functional Mobility/Gait Assessment  Gait Assistance: Independent  Distance (ft): 300  Assistive Device: None  Pattern: Within Functional Limits  Stairs: Stairs  How Many Stairs: 6  Device: 1 Rail  Assist: Modified independent  Pattern: Ascend and Descend  Ascend and Descend : Reciprocal    Skilled Therapy Provided     Bed Mobility:  Rolling: indep  Supine to sit: indep   Sit to supine: indep     Transfer Mobility:  Sit to stand: indep   Stand to sit: indep  Gait = indep    Therapist's comments:encouraged QID ambulation as tolerated    Exercise/Education Provided:  Functional activity tolerated    Patient End of Session: Up in chair;Needs met;RN aware of session/findings;Call light within  reach;All patient questions and concerns addressed;Family present    Patient Evaluation Complexity Level:  History Low - no personal factors and/or co-morbidities   Examination of body systems Low - addressing 1-2 elements   Clinical Presentation Low - Stable   Clinical Decision Making Low Complexity       PT Session Time: 15 minutes  Gait Training:  minutes  Therapeutic Activity:  minutes  Neuromuscular Re-education:  minutes  Therapeutic Exercise:  minutes

## 2024-03-25 NOTE — ANESTHESIA POSTPROCEDURE EVALUATION
[facility]    Renato Hall Patient Status:  Inpatient   Age/Gender 73 year old male MRN [id number]   Location [facility] ENDOSCOPY PAIN CENTER Attending Guille Davis*   Hosp Day # 1 PCP Deyanira Resendez MD       Anesthesia Post-op Note    ESOPHAGOGASTRODUODENOSCOPY (EGD) WITH CLIP PLACEMENT X2    Procedure Summary       Date: 03/25/24 Room / Location:  ENDOSCOPY 03 / [facility] ENDOSCOPY    Anesthesia Start: 1345 Anesthesia Stop: 1408    Procedure: ESOPHAGOGASTRODUODENOSCOPY (EGD) WITH CLIP PLACEMENT X2 Diagnosis: (ge junction ulcer)    Surgeons: Aris Rod MD Anesthesiologist: Daniel Boyce MD    Anesthesia Type: MAC ASA Status: 3            Anesthesia Type: MAC    Vitals Value Taken Time   /48 03/25/24 1408   Temp  03/25/24 1410   Pulse 70 03/25/24 1410   Resp 17 03/25/24 1410   SpO2 99 % 03/25/24 1410   Vitals shown include unfiled device data.    Patient Location: Endoscopy    Anesthesia Type: MAC    Airway Patency: patent    Postop Pain Control: adequate    Mental Status: preanesthetic baseline    Nausea/Vomiting: none    Cardiopulmonary/Hydration status: stable euvolemic    Complications: no apparent anesthesia related complications    Postop vital signs: stable    Dental Exam: Unchanged from Preop

## 2024-03-25 NOTE — PROGRESS NOTES
[facility]  Hospitalist Progress Note                                                                     [facility]   part of [facility]        Renato Hall  5/30/1950    SUBJECTIVE:  Patient seen and examined.  Feeling better today, breathing/VILLAGOMEZ improved.  Family bedside.  Ready for scope in afternoon.  Denies CP/SOB.  NAD.       OBJECTIVE:  Temp:  [97.4 °F (36.3 °C)-98.6 °F (37 °C)] 97.9 °F (36.6 °C)  Pulse:  [67-92] 67  Resp:  [0-21] 18  BP: ()/(50-61) 113/55  SpO2:  [95 %-100 %] 97 %  Exam  Gen: No acute distress, alert and oriented x3, no focal neurologic deficits  Pulm: Lungs clear bilaterally, normal respiratory effort  CV: Heart with regular rate and rhythm, no murmur.  Normal PMI.    Abd: Abdomen soft, nontender, nondistended, no organomegaly, bowel sounds present  MSK: Full range of motion in extremities, no clubbing, no cyanosis  Skin: no rashes or lesions    Labs:   Recent Labs   Lab 03/24/24  1339 03/24/24  1340 03/24/24  2221 03/25/24  0611   WBC 5.5  --  4.9 3.8*   HGB 5.8*  --  6.8* 7.1*   MCV 94.1  --  90.7 88.2   PLT 14.0*  --  29.0* 20.0*   BAND 3  --   --   --    INR  --  1.15  --   --        Recent Labs   Lab 03/24/24  1340 03/25/24  0748   * 136   K 3.9 3.7    104   CO2 25.0 28.0   BUN 32* 29*   CREATSERUM 1.26 1.15   CA 8.9 8.9   MG  --  1.3*   * 173*       Recent Labs   Lab 03/24/24  1340 03/25/24  0748   ALT 16 14*   AST 13* 10*   ALB 2.9* 2.9*       Recent Labs   Lab 03/24/24  1717 03/24/24  2110 03/25/24  0739   PGLU 152* 202* 182*       Meds:   Scheduled:    pantoprazole  40 mg Intravenous Q12H    allopurinol  300 mg Oral Daily    carvedilol  6.25 mg Oral BID with meals    rosuvastatin  10 mg Oral Nightly    insulin aspart  1-5 Units Subcutaneous TID AC and HS    sodium chloride   Intravenous Once     Continuous Infusions:    sodium chloride Stopped (03/24/24 1505)    lactated ringers 100 mL/hr  at 03/25/24 0921     PRN: glucose **OR** glucose **OR** glucose-vitamin C **OR** dextrose **OR** glucose **OR** glucose **OR** glucose-vitamin C, acetaminophen, melatonin, polyethylene glycol (PEG 3350), sennosides, bisacodyl, fleet enema, ondansetron, metoclopramide, heparin    Assessment/Plan:  Principal Problem:    Gastrointestinal hemorrhage, unspecified gastrointestinal hemorrhage type  Active Problems:    Anemia, unspecified type    Hyponatremia    Anemia    Azotemia    Hyperglycemia    Thrombocytopenia (HCC)    73 year old male with PMH sig for 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 Henning of [facility] oncology, diabetes mellitus type 2, hypertension, hyperlipidemia presented with dark stools and weakness.     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

## 2024-03-25 NOTE — DIETARY MALNUTRITION NOTE
[facility]   part of [facility]    NUTRITION ASSESSMENT    Pt meets moderate malnutrition criteria at this time.    CRITERIA FOR MALNUTRITION DIAGNOSIS:  Criteria for non-severe malnutrition diagnosis: chronic illness related to energy intake less than75% for greater than 1 month and significant wt loss:18 lb (8.5%) in 3 months.      NUTRITION INTERVENTION:    RD nutrition Care Plan- See RD nutrition assessment for additional recommendations  Meal and Snacks - Monitor and encourage adequate PO intake. Full Liquid diet. Diet advancement per GI.  Medical Food Supplements -tarah [employer] 1.0 BID; at breakfast and dinner, when diet advances. Rationale/use for oral supplements discussed.      PATIENT STATUS:     3/25/24- 74 y/o male admitted with GI bleed, +stool guaiac. Pt seen d/t nutrition consult for at risk and MST score of 3. Pt NPO at time of visit. Plans for EGD around 2 pm today. EGD revealed ulcerated gastric cardia mass w/ clot and friable mucosa. Endoclips x 2 placed. Diet advanced to Full Liquid.  Pt reported decreased appetite since starting chemo in Nov 2023. Denied any N/V. C/o gas pain after eating. Even experienced gas after eating just broth and lemon ice last night. Pt stated he has tried Ensure and Premier Protein, but has experienced diarrhea after consuming. Pt stated he has never had tolerance issues to dairy before. Agreeable to try [employer] ONS (dairy free) during admit. Encouraged smaller more frequent meals.  PMH: gastric neuroendocrine tumor poorly differentiated w/ invasion into the pancreas, on palliative chemo (last chemo on 3/14), DM2, former smoker.        ANTHROPOMETRICS:  Ht: 172.7 cm (5' 8\")  Wt: 88 kg (193 lb 14.4 oz).   BMI: Body mass index is 29.48 kg/m².  IBW: 70 kg      WEIGHT HISTORY:     Per EMR hx, pt has lost about 18 lb (8.5%) in about 3 months, which is severe wt loss per standards.     Wt Readings from Last 10 Encounters:   03/24/24 88 kg (193 lb 14.4 oz)    24 90.7 kg (200 lb)   23 95.7 kg (211 lb)   23 96.6 kg (213 lb)   23 105.2 kg (232 lb)   22 117 kg (258 lb)   21 114.8 kg (253 lb)   21 110.2 kg (243 lb)   21 110.5 kg (243 lb 9.6 oz)   21 112.9 kg (249 lb)        NUTRITION:  Diet:       Procedures    NPO      Food Allergies: No  Cultural/Ethnic/Mu-ism Preferences Addressed: Yes    Percent Meals Eaten (last 3 days)       None          GI system review:  gas after eating.  Last BM: 3/24  Skin and wounds: no pressure ulcers per RN documentation    NUTRITION RELATED PHYSICAL FINDINGS:     1. Body Fat/Muscle Mass: no wasting noted     2. Fluid Accumulation: none per RN documentation     NUTRITION PRESCRIPTION: 87.9 kg-193 lb (3/24)  Calories: 4572-4958 calories/day (20-25 kcal/kg)  Protein:  grams protein/day ( 1.0-1.3  grams protein per kg)  Fluid: ~1 ml/kcal or per MD discretion    NUTRITION DIAGNOSIS/PROBLEM:  Malnutrition related to  chronic illness  as evidenced by  energy intake less than75% for greater than 1 month and significant wt loss:18 lb (8.5%) in 3 months.      MONITOR AND EVALUATE/NUTRITION GOALS:  PO intake of 75% of meals TID - New  PO intake of 75% of oral nutrition supplement/s - New  Weight stable within 1 to 2 lbs during admission - New  Return to PO intake or advance diet in 24-48 hrs - New      MEDICATIONS:  IVF:LR at 100 ml/hr, Insulin    LABS:  Glu:173, M.3    Pt is at Moderate nutrition risk    Susan Farris MS, RD, LDN  Clinical Dietitian  Ext:[phone]

## 2024-03-25 NOTE — ANESTHESIA PREPROCEDURE EVALUATION
PRE-OP EVALUATION    Patient Name: Renato Hall    Admit Diagnosis: Rectal bleeding [K62.5]  Hemoglobin drop [R71.0]  Gastric mass [K31.89]  Anemia, unspecified type [D64.9]  Leukocytosis, unspecified type [D72.829]    Pre-op Diagnosis: GI bleed [K92.2]    ESOPHAGOGASTRODUODENOSCOPY (EGD)    Anesthesia Procedure: ESOPHAGOGASTRODUODENOSCOPY (EGD)    Surgeon(s) and Role:     * Handy Madrid MD - Primary    Pre-op vitals reviewed.  Temp: 98.5 °F (36.9 °C)  Pulse: 67  Resp: 18  BP: 109/58  SpO2: 97 %  Body mass index is 29.48 kg/m².    Current medications reviewed.  Hospital Medications:   sodium chloride 0.9% infusion   Intravenous Once    [COMPLETED] sodium chloride 0.9 % IV bolus 500 mL  500 mL Intravenous Once    Followed by    sodium chloride 0.9% infusion   Intravenous Continuous    [COMPLETED] pantoprazole (Protonix) 40 mg in sodium chloride 0.9% PF 10 mL IV push  40 mg Intravenous Once    [] sodium chloride 0.9% infusion   Intravenous Continuous    pantoprazole (Protonix) 40 mg in sodium chloride 0.9% PF 10 mL IV push  40 mg Intravenous Q12H    allopurinol (Zyloprim) tab 300 mg  300 mg Oral Daily    carvedilol (Coreg) tab 6.25 mg  6.25 mg Oral BID with meals    rosuvastatin (Crestor) tab 10 mg  10 mg Oral Nightly    glucose (Dex4) 15 GM/59ML oral liquid 15 g  15 g Oral Q15 Min PRN    Or    glucose (Glutose) 40% oral gel 15 g  15 g Oral Q15 Min PRN    Or    glucose-vitamin C (Dex-4) chewable tab 4 tablet  4 tablet Oral Q15 Min PRN    Or    dextrose 50% injection 50 mL  50 mL Intravenous Q15 Min PRN    Or    glucose (Dex4) 15 GM/59ML oral liquid 30 g  30 g Oral Q15 Min PRN    Or    glucose (Glutose) 40% oral gel 30 g  30 g Oral Q15 Min PRN    Or    glucose-vitamin C (Dex-4) chewable tab 8 tablet  8 tablet Oral Q15 Min PRN    acetaminophen (Tylenol Extra Strength) tab 500 mg  500 mg Oral Q4H PRN    melatonin tab 3 mg  3 mg Oral Nightly PRN    polyethylene glycol (PEG 3350) (Miralax) 17 g oral packet 17 g  17  g Oral Daily PRN    sennosides (Senokot) tab 17.2 mg  17.2 mg Oral Nightly PRN    bisacodyl (Dulcolax) 10 MG rectal suppository 10 mg  10 mg Rectal Daily PRN    fleet enema (Fleet) 7-19 GM/118ML rectal enema 133 mL  1 enema Rectal Once PRN    ondansetron (Zofran) 4 MG/2ML injection 4 mg  4 mg Intravenous Q6H PRN    metoclopramide (Reglan) 5 mg/mL injection 10 mg  10 mg Intravenous Q8H PRN    lactated ringers infusion   Intravenous Continuous    insulin aspart (NovoLOG) 100 Units/mL FlexPen 1-5 Units  1-5 Units Subcutaneous TID AC and HS    heparin (Porcine) 100 Units/mL lock flush 500 Units  5 mL Intravenous PRN    [COMPLETED] sodium chloride 0.9% infusion   Intravenous Once       Outpatient Medications:     Medications Prior to Admission   Medication Sig Dispense Refill Last Dose    rosuvastatin 10 MG Oral Tab Take 1 tablet (10 mg total) by mouth daily. (Patient taking differently: Take 1 tablet (10 mg total) by mouth nightly.) 90 tablet 3 3/24/2024    allopurinol 300 MG Oral Tab Take 1 tablet (300 mg total) by mouth daily. 90 tablet 1 3/24/2024    [] HYDROcodone-acetaminophen (NORCO) 5-325 MG Oral Tab Take 1-2 tablets by mouth every 4 to 6 hours as needed for Pain. 20 tablet 0     Benazepril HCl 20 MG Oral Tab Take 1 tablet (20 mg total) by mouth daily. Sometimes 10mg       pantoprazole 40 MG Oral Tab EC Take 1 tablet (40 mg total) by mouth 2 (two) times daily before meals. 60 tablet 0     ondansetron 4 MG Oral Tablet Dispersible Take 1 tablet (4 mg total) by mouth every 8 (eight) hours as needed for Nausea.       metFORMIN HCl ER (GLUCOPHAGE XR) 500 MG Oral Tablet 24 Hr Take 1 tablet (500 mg total) by mouth daily with breakfast. (Patient taking differently: Take 1 tablet (500 mg total) by mouth every evening.) 90 tablet 1     indapamide 1.25 MG Oral Tab Take 1 tablet (1.25 mg total) by mouth every morning. 90 tablet 1     carvedilol 25 MG Oral Tab Take 1 tablet (25 mg total) by mouth 2 (two) times daily  with meals. 180 tablet 1     Fenofibrate 54 MG Oral Tab Take 1 tablet (54 mg total) by mouth daily. with food (Patient taking differently: Take 1 tablet (54 mg total) by mouth at bedtime. with food) 90 tablet 1     Microlet Lancets Does not apply Misc Test daily 100 each 3     Glucose Blood (CONTOUR NEXT TEST) In Vitro Strip Test blood sugar daily 100 each 1     Glucose Blood In Vitro Strip ONE EVERY  strip 3     Fexofenadine HCl (ALLEGRA OR) Take 1 tablet by mouth daily.          Allergies: Amlodipine      Anesthesia Evaluation    Patient summary reviewed.    Anesthetic Complications  (-) history of anesthetic complications         GI/Hepatic/Renal    Negative GI/hepatic/renal ROS.                             Cardiovascular                  (+) hypertension                                     Endo/Other      (+) diabetes  type 2, not using insulin                         Pulmonary    Negative pulmonary ROS.                       Neuro/Psych                              GI bleed  Hypertrophic cardiomyopathy  gastic cancer        Past Surgical History:   Procedure Laterality Date    CHOLECYSTECTOMY  06/21/1988    COLONOSCOPY  04/21/2008    tiny polyps    COLONOSCOPY  08/27/2014    Procedure: COLONOSCOPY;  Surgeon: Liang Quevedo MD;  Location:  ENDOSCOPY    COLONOSCOPY N/A 09/20/2017    Procedure: COLONOSCOPY;  Surgeon: Liang Quevedo MD;  Location:  ENDOSCOPY    COLONOSCOPY      PERIPHERAL VASCULAR SCREENING HISTORICAL CONV Bilateral 05/22/2017    mild carotid narrowing, PAD screen    UPPER GI ENDOSCOPY,EXAM      VASCULAR LAB - [facility] Bilateral 11/01/2011    PAD screening normal     Social History     Socioeconomic History    Marital status:      Spouse name: Dayan    Number of children: 2    Years of education: 12   Occupational History    Occupation:      Comment: self employed   Tobacco Use    Smoking status: Former     Packs/day: 1.00     Years: 17.00     Additional pack  years: 0.00     Total pack years: 17.00     Types: Cigarettes     Quit date: 1986     Years since quittin.2    Smokeless tobacco: Never   Vaping Use    Vaping Use: Never used   Substance and Sexual Activity    Alcohol use: No     Alcohol/week: 0.0 standard drinks of alcohol    Drug use: No    Sexual activity: Yes     Partners: Female   Other Topics Concern     Service Yes     Comment: [employer]     Blood Transfusions No    Caffeine Concern Yes     Comment: coffee 1 cup a day    Occupational Exposure Yes     Comment: asbestos    Hobby Hazards No    Sleep Concern No    Stress Concern No    Weight Concern Yes    Special Diet No    Back Care No    Exercise Yes     Comment: walk    Bike Helmet No    Seat Belt Yes    Self-Exams No     History   Drug Use No     Available pre-op labs reviewed.  Lab Results   Component Value Date    WBC 3.8 (L) 2024    RBC 2.28 (L) 2024    HGB 7.1 (L) 2024    HCT 20.1 (L) 2024    MCV 88.2 2024    MCH 31.1 2024    MCHC 35.3 2024    RDW 15.9 2024    PLT 20.0 (L) 2024     Lab Results   Component Value Date     2024    K 3.7 2024     2024    CO2 28.0 2024    BUN 29 (H) 2024    CREATSERUM 1.15 2024     (H) 2024    CA 8.9 2024     Lab Results   Component Value Date    INR 1.15 2024         Airway      Mallampati: I  Mouth opening: >3 FB  TM distance: > 6 cm  Neck ROM: full Cardiovascular    Cardiovascular exam normal.  Rhythm: regular  Rate: normal     Dental             Pulmonary    Pulmonary exam normal.                 Other findings              ASA: 3   Plan: MAC  NPO status verified and     Post-procedure pain management plan discussed with surgeon and patient.    Comment:    Plan is MAC anesthesia, which may include deep sedation.  Implied that memory of procedure is unlikely although intraop recall, if it occurs, may be a reasonable and comfortable  experience with this anesthetic.  Aware that general anesthesia is not intended though deep sedation may include occasional moments of general anesthesia.  The backup plan for safe patient care may include change of plan to full general anesthesia with potential airway placement.  Patient (legal guardian) demonstrated understanding, accepts risks and benefits, and wishes to proceed as reflected in the signed consent form.  Difficulty airway equipment available as needed, may need general anesthesia OETT.  Previous anesthesia records reviewed.  Plan/risks discussed with: patient            Plan/risks discussed with: patient                Present on Admission:   Hyponatremia   Anemia   Azotemia   Hyperglycemia

## 2024-03-25 NOTE — PROGRESS NOTES
Hemoglobin after 2 units of prbc 6.8.  received one ;more unit.  Tolerated well.  Lungs clear.  No resp distress.  No bowel movements.  Ambulating independently.  On tele.  Heart rate sinus rhythm.  NPO for possible scope today.

## 2024-03-25 NOTE — OPERATIVE REPORT
[facility]    Renato Hall Patient Status:  Inpatient    1950 MRN [id number]   Location [facility] ENDOSCOPY PAIN CENTER Attending Guille Davis*   Hosp Day # 1 PCP Deyanira Resendez MD         PATIENT NAME: Renato Hall  DATE OF OPERATION: 3/25/2024    PREOPERATIVE DIAGNOSIS:  Melena  Anemia  Hx gastric cancer  POSTOPERATIVE DIAGNOSIS:  Ulcerated gastric cardia mass with clot and friable mucosa.  No site amenable to endoscopic therapy. Endoclips x 2 placed     PROCEDURE PERFORMED: Upper endoscopy with MAC sedation  SURGEON: Aris Rod MD   MEDICATIONS: MAC IV in divided doses under the supervision of Anesthesia.  PROCEDURE AND FINDINGS: The patient was placed into the left lateral decubitus position after informed consent was obtained.  All questions were answered.  MAC IV sedation was administered.  The Olympus video gastroscope was introduced into the mouth and passed to the third portion of the duodenum.  The entire examined esophagus was normal. There as a large ulcerated gastric cardia mass with clot and friable mucosa.  No site amenable to endoscopic therapy. Endoclips x 2 placed  The remainder of the entire examined stomach,  including retroflexion view, was normal.  The entire examined duodenum was normal.   The gastroscope was removed from the patient.  The procedure was completed. There were no implants placed nor significant blood loss.  The patient tolerated the procedure well. There were no immediate post procedure complications.  Moderate sedation time:  None.  Deep sedation provided by anesthesia    RECOMMENDATIONS:  BID PPI  Full liquid diet.  Follow labs.  Bleeding should improve with improvement of platelets    Aris Rod MD

## 2024-03-25 NOTE — CONSULTS
[facility] HEMATOLOGY ONCOLOGY  Report of Consultation    Renato Hall Patient Status:  Inpatient    1950 MRN [id number]   Location [facility] 4NW-A Attending [place] Tsang MD   Hosp Day # 1 PCP Deyanira Resendez MD     ADMIT DATE AND TIME: 3/24/2024  1:09 PM    ADMIT DIAGNOSIS: Thrombocytopenia (HCC) [D69.6]  Gastrointestinal hemorrhage, unspecified gastrointestinal hemorrhage type [K92.2]  Anemia, unspecified type [D64.9]          REASON FOR CONSULTATION:     Poorly differentiated neuroendocrine carcinoma  Anemia  GI bleed      HISTORY OF PRESENTING ILLNESS     Renato Hall is a 73 year old male with a diagnosis of 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  Diagnosis in 2023.  Initial treatment carboplatin and paclitaxel but after progression treatment was changed to topotecan in 2023    Now presented with increased weakness and fatigue  Also noticed dark color of stools for the last few days  Was noted to have a hemoglobin of 5.8 with platelet count of 13    Given packed cells x 3 overnight along with platelets  Laying in bed  Family at bedside    No acute issues at this time           PERTINENT HISTORY:     History:  Past Medical History:   Diagnosis Date    Abnormal screening CT of heart 2005    calcium score 6    Abnormal screening CT of heart 2010    calcium score of 18    Abnormal screening CT of heart 2015    calcium score 53    Adenomatous colon polyp 2014    Allergic rhinitis due to pollen     BPH (benign prostatic hyperplasia)     Cancer (HCC)     Neuroendocrine CA/Pancreatic    Cardiomyopathy (HCC)     2D Echo 23    COVID 2022    No hospitalization. Harsh coughing    Degeneration of cervical intervertebral disc     Diverticula of colon     Essential hypertension, benign     GOUT     High blood pressure     High  cholesterol     History of blood transfusion     No reaction    Hypertrophic cardiomyopathy (HCC)     Personal history of antineoplastic chemotherapy 12/05/2023    Last treatment    Pure hypercholesterolemia     Tinnitus, bilateral     Type II or unspecified type diabetes mellitus without mention of complication, not stated as uncontrolled     Visual impairment     Glasses     Past Surgical History:   Procedure Laterality Date    CHOLECYSTECTOMY  06/21/1988    COLONOSCOPY  04/21/2008    tiny polyps    COLONOSCOPY  08/27/2014    Procedure: COLONOSCOPY;  Surgeon: Liang Quevedo MD;  Location:  ENDOSCOPY    COLONOSCOPY N/A 09/20/2017    Procedure: COLONOSCOPY;  Surgeon: Liang Quevedo MD;  Location:  ENDOSCOPY    COLONOSCOPY      PERIPHERAL VASCULAR SCREENING HISTORICAL CONV Bilateral 05/22/2017    mild carotid narrowing, PAD screen    UPPER GI ENDOSCOPY,EXAM      VASCULAR LAB - [facility] Bilateral 11/01/2011    PAD screening normal     Family History   Problem Relation Age of Onset    Arthritis Father         TKA    Hypertension Father     Diabetes Father     Obesity Father     Cancer Mother 70        colon, ?uterine    Genito-Urinary Disorder Mother         hysterectomy    Substance Abuse Son     Asthma Son     High Cholesterol Son     Gastro-Intestinal Disorder Brother         colon polyps, GERD    Hypertension Brother     Neurological Disorder Daughter         MS    Diabetes Daughter     Hypertension Sister     Diabetes Sister     Lipids Sister        SOCIAL HX   reports that he quit smoking about 38 years ago. His smoking use included cigarettes. He has a 17 pack-year smoking history. He has never used smokeless tobacco. He reports that he does not drink alcohol and does not use drugs.    Allergies:  Allergies   Allergen Reactions    Amlodipine SWELLING     Ankle swelling on amlodipine.       Medications:    Pre-Admission Meds:  Current Outpatient Medications   Medication Instructions    allopurinol  (ZYLOPRIM) 300 mg, Oral, Daily    Benazepril HCl (LOTENSIN) 20 mg, Oral, Daily, Sometimes 10mg    carvedilol (COREG) 25 mg, Oral, 2 times daily with meals    Fenofibrate 54 mg, Oral, Daily, with food    Fexofenadine HCl (ALLEGRA OR) Take 1 tablet by mouth daily.    Glucose Blood (CONTOUR NEXT TEST) In Vitro Strip Test blood sugar daily    Glucose Blood In Vitro Strip ONE EVERY DAY    indapamide (LOZOL) 1.25 mg, Oral, Every morning    metFORMIN ER (GLUCOPHAGE XR) 500 mg, Oral, Daily with breakfast    Microlet Lancets Does not apply Misc Test daily    ondansetron (ZOFRAN-ODT) 4 mg, Oral, Every 8 hours PRN    pantoprazole (PROTONIX) 40 mg, Oral, 2 times daily before meals    rosuvastatin (CRESTOR) 10 mg, Oral, Daily       Current Inpatient Meds:   pantoprazole  40 mg Intravenous Q12H    allopurinol  300 mg Oral Daily    carvedilol  6.25 mg Oral BID with meals    rosuvastatin  10 mg Oral Nightly    insulin aspart  1-5 Units Subcutaneous TID AC and HS    sodium chloride   Intravenous Once       Infusion   sodium chloride Stopped (03/24/24 1505)    lactated ringers 100 mL/hr at 03/24/24 2100       PRN  glucose **OR** glucose **OR** glucose-vitamin C **OR** dextrose **OR** glucose **OR** glucose **OR** glucose-vitamin C, acetaminophen, melatonin, polyethylene glycol (PEG 3350), sennosides, bisacodyl, fleet enema, ondansetron, metoclopramide, heparin    Review of Systems:  A 12-point review of systems was done with pertinent positives and negatives per the HPI.       Physical Exam:   Blood pressure 113/55, pulse 67, temperature 97.9 °F (36.6 °C), temperature source Oral, resp. rate 18, height 5' 8\" (1.727 m), weight 193 lb 14.4 oz (88 kg), SpO2 97%.    Performance Status: 3   General: Patient is alert and oriented x 3, not in acute distress.   HEENT: No Icterus. Oropharynx is clear.   Neck:  No palpable lymphadenopathy. Neck is supple.   Chest: Clear to auscultation.   Heart: Regular rate and rhythm.    Abdomen:  Nontender   Extremities: Pedal pulses are present. No edema.   Neurological: Grossly intact.    Lymphatics: There is no palpable lymphadenopathy           DIAGNOSTIC WORK UP:     Laboratory Data:      Recent Results (from the past 24 hour(s))   RAINBOW DRAW LAVENDER    Collection Time: 03/24/24  1:39 PM   Result Value Ref Range    Hold Lavender Auto Resulted    RAINBOW DRAW GOLD    Collection Time: 03/24/24  1:39 PM   Result Value Ref Range    Hold Gold Auto Resulted    Rapid SARS-CoV-2 by PCR    Collection Time: 03/24/24  1:39 PM    Specimen: Nares; Other   Result Value Ref Range    Rapid SARS-CoV-2 by PCR Not Detected Not Detected   CBC W/ DIFFERENTIAL    Collection Time: 03/24/24  1:39 PM   Result Value Ref Range    WBC 5.5 4.0 - 11.0 x10(3) uL    RBC 1.87 (L) 3.80 - 5.80 x10(6)uL    HGB 5.8 (LL) 13.0 - 17.5 g/dL    HCT 17.6 (L) 39.0 - 53.0 %    PLT 14.0 (LL) 150.0 - 450.0 10(3)uL    MCV 94.1 80.0 - 100.0 fL    MCH 31.0 26.0 - 34.0 pg    MCHC 33.0 31.0 - 37.0 g/dL    RDW 16.1 %    Neutrophil Absolute Prelim 3.33 1.50 - 7.70 x10 (3) uL   ABORH (Blood Type)    Collection Time: 03/24/24  1:39 PM   Result Value Ref Range    ABO BLOOD TYPE A     RH BLOOD TYPE Positive    Antibody Screen    Collection Time: 03/24/24  1:39 PM   Result Value Ref Range    Antibody Screen Negative    Scan slide    Collection Time: 03/24/24  1:39 PM   Result Value Ref Range    Slide Review Slide reviewed, manual differential added    Manual differential    Collection Time: 03/24/24  1:39 PM   Result Value Ref Range    Neutrophil Absolute Manual 3.47 1.50 - 7.70 x10(3) uL    Lymphocyte Absolute Manual 1.82 1.00 - 4.00 x10(3) uL    Monocyte Absolute Manual 0.22 0.10 - 1.00 x10(3) uL    Eosinophil Absolute Manual 0.00 0.00 - 0.70 x10(3) uL    Basophil Absolute Manual 0.00 0.00 - 0.20 x10(3) uL    Neutrophils % Manual 60 %    Band % 3 %    Lymphocyte % Manual 33 %    Monocyte % Manual 4 %    Eosinophil % Manual 0 %    Basophil % Manual 0 %     Total Cells Counted 100     RBC Morphology See morphology below (A) Normal, Slide reviewed, see previous RBC morphology.    Platelet Morphology Normal Normal    Macrocytosis 1+      Microcytosis 1+      Hypochromia 2+ (A)     Hemoglobin A1C    Collection Time: 03/24/24  1:39 PM   Result Value Ref Range    HgbA1C 7.6 (H) <5.7 %    Estimated Average Glucose 171 (H) 68 - 126 mg/dL   Comp Metabolic Panel (14)    Collection Time: 03/24/24  1:40 PM   Result Value Ref Range    Glucose 217 (H) 70 - 99 mg/dL    Sodium 134 (L) 136 - 145 mmol/L    Potassium 3.9 3.5 - 5.1 mmol/L    Chloride 101 98 - 112 mmol/L    CO2 25.0 21.0 - 32.0 mmol/L    Anion Gap 8 0 - 18 mmol/L    BUN 32 (H) 9 - 23 mg/dL    Creatinine 1.26 0.70 - 1.30 mg/dL    Calcium, Total 8.9 8.5 - 10.1 mg/dL    Calculated Osmolality 291 275 - 295 mOsm/kg    eGFR-Cr 60 >=60 mL/min/1.73m2    AST 13 (L) 15 - 37 U/L    ALT 16 16 - 61 U/L    Alkaline Phosphatase 60 45 - 117 U/L    Bilirubin, Total 0.7 0.1 - 2.0 mg/dL    Total Protein 6.3 (L) 6.4 - 8.2 g/dL    Albumin 2.9 (L) 3.4 - 5.0 g/dL    Globulin  3.4 2.8 - 4.4 g/dL    A/G Ratio 0.9 (L) 1.0 - 2.0   Prothrombin Time (PT)    Collection Time: 03/24/24  1:40 PM   Result Value Ref Range    PT 14.7 11.6 - 14.8 seconds    INR 1.15 0.80 - 1.20   PTT, Activated    Collection Time: 03/24/24  1:40 PM   Result Value Ref Range    PTT 26.8 23.3 - 35.6 seconds   RAINBOW DRAW LIGHT GREEN    Collection Time: 03/24/24  1:40 PM   Result Value Ref Range    Hold Lt Green Auto Resulted    RAINBOW DRAW BLUE    Collection Time: 03/24/24  1:40 PM   Result Value Ref Range    Hold Blue Auto Resulted    POCT Glucose    Collection Time: 03/24/24  5:17 PM   Result Value Ref Range    POC Glucose 152 (H) 70 - 99 mg/dL   POCT Glucose    Collection Time: 03/24/24  9:10 PM   Result Value Ref Range    POC Glucose 202 (H) 70 - 99 mg/dL   CBC W/ DIFFERENTIAL    Collection Time: 03/24/24 10:21 PM   Result Value Ref Range    WBC 4.9 4.0 - 11.0 x10(3) uL     RBC 2.25 (L) 3.80 - 5.80 x10(6)uL    HGB 6.8 (LL) 13.0 - 17.5 g/dL    HCT 20.4 (L) 39.0 - 53.0 %    PLT 29.0 (L) 150.0 - 450.0 10(3)uL    MCV 90.7 80.0 - 100.0 fL    MCH 30.2 26.0 - 34.0 pg    MCHC 33.3 31.0 - 37.0 g/dL    RDW 16.5 %    Neutrophil Absolute Prelim 2.61 1.50 - 7.70 x10 (3) uL    Neutrophil Absolute 2.61 1.50 - 7.70 x10(3) uL    Lymphocyte Absolute 1.94 1.00 - 4.00 x10(3) uL    Monocyte Absolute 0.34 0.10 - 1.00 x10(3) uL    Eosinophil Absolute 0.00 0.00 - 0.70 x10(3) uL    Basophil Absolute 0.00 0.00 - 0.20 x10(3) uL    Immature Granulocyte Absolute 0.05 0.00 - 1.00 x10(3) uL    Neutrophil % 52.8 %    Lymphocyte % 39.3 %    Monocyte % 6.9 %    Eosinophil % 0.0 %    Basophil % 0.0 %    Immature Granulocyte % 1.0 %   Scan slide    Collection Time: 03/24/24 10:21 PM   Result Value Ref Range    Slide Review Slide reviewed,morphology review added    RBC Morphology Scan    Collection Time: 03/24/24 10:21 PM   Result Value Ref Range    RBC Morphology See morphology below (A) Normal, Slide reviewed, see previous RBC morphology.    Platelet Morphology Normal Normal    Macrocytosis 1+      Ovalocytes 1+     Prepare RBC Once    Collection Time: 03/25/24 12:16 AM   Result Value Ref Range    Blood Product [id number]     Unit Number [id number]     UNIT ABO A     UNIT RH POS     Product Status Issued     Expiration Date 441775946317     Blood Type Barcode 6200     Unit Volume 350 ml   Prepare RBC Once    Collection Time: 03/25/24 12:30 AM   Result Value Ref Range    Blood Product [id number]     Unit Number [id number]     UNIT ABO A     UNIT RH POS     Product Status Presumed Transfused     Expiration Date 017706669049     Blood Type Barcode 6200     Unit Volume 350 ml   Prepare platelets Once    Collection Time: 03/25/24 12:30 AM   Result Value Ref Range    Blood Product [id number]     Unit Number [id number]     UNIT ABO O     UNIT RH POS     Product Status Presumed Transfused     Expiration Date  548522285775     Blood Type Barcode 5100     Unit Volume 195 ml   CBC W/ DIFFERENTIAL    Collection Time: 03/25/24  6:11 AM   Result Value Ref Range    WBC 3.8 (L) 4.0 - 11.0 x10(3) uL    RBC 2.28 (L) 3.80 - 5.80 x10(6)uL    HGB 7.1 (L) 13.0 - 17.5 g/dL    HCT 20.1 (L) 39.0 - 53.0 %    PLT 20.0 (L) 150.0 - 450.0 10(3)uL    MCV 88.2 80.0 - 100.0 fL    MCH 31.1 26.0 - 34.0 pg    MCHC 35.3 31.0 - 37.0 g/dL    RDW 15.9 %    Neutrophil Absolute Prelim 2.19 1.50 - 7.70 x10 (3) uL    Neutrophil Absolute 2.19 1.50 - 7.70 x10(3) uL    Lymphocyte Absolute 1.20 1.00 - 4.00 x10(3) uL    Monocyte Absolute 0.33 0.10 - 1.00 x10(3) uL    Eosinophil Absolute 0.00 0.00 - 0.70 x10(3) uL    Basophil Absolute 0.01 0.00 - 0.20 x10(3) uL    Immature Granulocyte Absolute 0.03 0.00 - 1.00 x10(3) uL    Neutrophil % 58.2 %    Lymphocyte % 31.9 %    Monocyte % 8.8 %    Eosinophil % 0.0 %    Basophil % 0.3 %    Immature Granulocyte % 0.8 %   Scan slide    Collection Time: 03/25/24  6:11 AM   Result Value Ref Range    Slide Review Slide reviewed, see previous RBC morphology.    POCT Glucose    Collection Time: 03/25/24  7:39 AM   Result Value Ref Range    POC Glucose 182 (H) 70 - 99 mg/dL       Recent Labs   Lab 03/24/24  1339 03/24/24  2221 03/25/24  0611   RBC 1.87* 2.25* 2.28*   HGB 5.8* 6.8* 7.1*   HCT 17.6* 20.4* 20.1*   MCV 94.1 90.7 88.2   MCH 31.0 30.2 31.1   MCHC 33.0 33.3 35.3   RDW 16.1 16.5 15.9   NEPRELIM 3.33 2.61 2.19   WBC 5.5 4.9 3.8*   PLT 14.0* 29.0* 20.0*       CULTURE RESULTS  No results found for this visit on 03/24/24.      Imaging:    XR CHEST AP PORTABLE  (CPT=71045)    Result Date: 3/24/2024  CONCLUSION:  Hyperinflation both lungs consistent with COPD.  Slight infiltrate versus atelectasis in the lung bases.  Port-A-Cath tip SVC.  No pneumothorax or pleural effusion.   LOCATION:  [facility]      Dictated by (CST): Rachael Mock MD on 3/24/2024 at 2:23 PM     Finalized by (CST): Rachael Mock MD on 3/24/2024 at 2:24 PM           PROBLEM LIST:     Principal Problem:    Gastrointestinal hemorrhage, unspecified gastrointestinal hemorrhage type  Active Problems:    Anemia, unspecified type    Hyponatremia    Anemia    Azotemia    Hyperglycemia    Thrombocytopenia (HCC)          ASSESSMENT AND PLAN:     Renato Hall is a 73 year old male with poorly differentiated metastatic neuroendocrine cancer of the stomach.    Admitted with GI bleed and pancytopenia    Metastatic poorly differentiated neuroendocrine cancer  On systemic chemotherapy topotecan in the clinic  No treatment while in the hospital    GI bleed  Tumor invading from stomach to pancreas presentation  GI bleed could be from tumor erosions  EGD planned by gastroenterology today    Pancytopenia  Chemotherapy induced myelotoxicity  Neutrophils normal despite low white count  Hemoglobin today is 7.1 after transfusion  Platelet count is 20 after transfusion  Would give another unit of platelets prior to EGD and monitor counts  Would closely monitor hemoglobin and transfuse as needed to keep hemoglobin in the safer range    We will follow while I am in the hospital        Thank you for allowing me to participate in the care of your patient.    Bacilio Ornelas MD      This note was prepared using Dragon [employer] voice recognition dictation software. As a result errors may occur. When identified these errors have been corrected. While every attempt is made to correct errors during dictation discrepancies may still exist. Please call me to clarify any errors.

## 2024-03-26 LAB
ANION GAP SERPL CALC-SCNC: 5 MMOL/L (ref 0–18)
BASOPHILS # BLD AUTO: 0 X10(3) UL (ref 0–0.2)
BASOPHILS NFR BLD AUTO: 0 %
BLOOD TYPE BARCODE: 5100
BLOOD TYPE BARCODE: 6200
BLOOD TYPE BARCODE: 6200
BUN BLD-MCNC: 20 MG/DL (ref 9–23)
CALCIUM BLD-MCNC: 8.8 MG/DL (ref 8.5–10.1)
CHLORIDE SERPL-SCNC: 109 MMOL/L (ref 98–112)
CO2 SERPL-SCNC: 27 MMOL/L (ref 21–32)
CREAT BLD-MCNC: 1.16 MG/DL
EGFRCR SERPLBLD CKD-EPI 2021: 67 ML/MIN/1.73M2 (ref 60–?)
EOSINOPHIL # BLD AUTO: 0.01 X10(3) UL (ref 0–0.7)
EOSINOPHIL NFR BLD AUTO: 0.3 %
ERYTHROCYTE [DISTWIDTH] IN BLOOD BY AUTOMATED COUNT: 16.1 %
GLUCOSE BLD-MCNC: 152 MG/DL (ref 70–99)
GLUCOSE BLD-MCNC: 166 MG/DL (ref 70–99)
GLUCOSE BLD-MCNC: 172 MG/DL (ref 70–99)
GLUCOSE BLD-MCNC: 182 MG/DL (ref 70–99)
GLUCOSE BLD-MCNC: 183 MG/DL (ref 70–99)
HCT VFR BLD AUTO: 20 %
HGB BLD-MCNC: 6.8 G/DL
HGB BLD-MCNC: 8.1 G/DL
IMM GRANULOCYTES # BLD AUTO: 0.04 X10(3) UL (ref 0–1)
IMM GRANULOCYTES NFR BLD: 1.1 %
LYMPHOCYTES # BLD AUTO: 1.21 X10(3) UL (ref 1–4)
LYMPHOCYTES NFR BLD AUTO: 31.8 %
MCH RBC QN AUTO: 30.4 PG (ref 26–34)
MCHC RBC AUTO-ENTMCNC: 34 G/DL (ref 31–37)
MCV RBC AUTO: 89.3 FL
MONOCYTES # BLD AUTO: 0.35 X10(3) UL (ref 0.1–1)
MONOCYTES NFR BLD AUTO: 9.2 %
NEUTROPHILS # BLD AUTO: 2.19 X10 (3) UL (ref 1.5–7.7)
NEUTROPHILS # BLD AUTO: 2.19 X10(3) UL (ref 1.5–7.7)
NEUTROPHILS NFR BLD AUTO: 57.6 %
OSMOLALITY SERPL CALC.SUM OF ELEC: 298 MOSM/KG (ref 275–295)
PLATELET # BLD AUTO: 31 10(3)UL (ref 150–450)
POTASSIUM SERPL-SCNC: 3.4 MMOL/L (ref 3.5–5.1)
RBC # BLD AUTO: 2.24 X10(6)UL
SODIUM SERPL-SCNC: 141 MMOL/L (ref 136–145)
UNIT VOLUME: 202 ML
UNIT VOLUME: 350 ML
UNIT VOLUME: 350 ML
WBC # BLD AUTO: 3.8 X10(3) UL (ref 4–11)

## 2024-03-26 PROCEDURE — 82962 GLUCOSE BLOOD TEST: CPT

## 2024-03-26 PROCEDURE — 36430 TRANSFUSION BLD/BLD COMPNT: CPT

## 2024-03-26 PROCEDURE — C9113 INJ PANTOPRAZOLE SODIUM, VIA: HCPCS | Performed by: INTERNAL MEDICINE

## 2024-03-26 PROCEDURE — 85025 COMPLETE CBC W/AUTO DIFF WBC: CPT | Performed by: HOSPITALIST

## 2024-03-26 PROCEDURE — 80048 BASIC METABOLIC PNL TOTAL CA: CPT | Performed by: HOSPITALIST

## 2024-03-26 PROCEDURE — 85018 HEMOGLOBIN: CPT | Performed by: HOSPITALIST

## 2024-03-26 RX ORDER — SUCRALFATE ORAL 1 G/10ML
1 SUSPENSION ORAL
Status: DISCONTINUED | OUTPATIENT
Start: 2024-03-26 | End: 2024-03-27

## 2024-03-26 RX ORDER — SODIUM CHLORIDE 9 MG/ML
INJECTION, SOLUTION INTRAVENOUS ONCE
Status: COMPLETED | OUTPATIENT
Start: 2024-03-26 | End: 2024-03-26

## 2024-03-26 RX ORDER — CARVEDILOL 12.5 MG/1
25 TABLET ORAL 2 TIMES DAILY WITH MEALS
Status: DISCONTINUED | OUTPATIENT
Start: 2024-03-26 | End: 2024-03-27

## 2024-03-26 NOTE — PROGRESS NOTES
[facility] HEMATOLOGY ONCOLOGY  Progress Note    Renato Hall Patient Status:  Inpatient    1950 MRN [id number]   Location [facility] 4NW-A Attending Guille Davis*   Hosp Day # 2 PCP Deyanira Resendez MD     ADMISSION DATE AND TIME: 3/24/2024  1:09 PM    ADMIT DIAGNOSIS: Thrombocytopenia (HCC) [D69.6]  Gastrointestinal hemorrhage, unspecified gastrointestinal hemorrhage type [K92.2]  Anemia, unspecified type [D64.9]    SUBJECTIVE:    Feeling better  Status post EGD yesterday  Findings noted: Large ulcerated gastric cardiac mass with clot and friable mucosa.  Endoclips were placed x 2.  Getting PRBC this morning    OBJECTIVE:  Blood pressure 139/54, pulse 71, temperature 98 °F (36.7 °C), temperature source Oral, resp. rate 18, height 5' 8\" (1.727 m), weight 193 lb 14.4 oz (88 kg), SpO2 95%.    PHYSICAL EXAM:  General: afebrile, alert and oriented x 3, pleasant  HEENT: oropharynx clear  CV: Regular rate and rhythm  Lungs: CTA  Abdomen: soft, non distended and non tender  Extremity: no edema or cyanosis       LABS:  Recent Results (from the past 24 hour(s))   POCT Glucose    Collection Time: 24  5:15 PM   Result Value Ref Range    POC Glucose 159 (H) 70 - 99 mg/dL   POCT Glucose    Collection Time: 24  8:52 PM   Result Value Ref Range    POC Glucose 167 (H) 70 - 99 mg/dL   Prepare RBC Once    Collection Time: 24 12:30 AM   Result Value Ref Range    Blood Product [id number]     Unit Number [id number]     UNIT ABO A     UNIT RH POS     Product Status Presumed Transfused     Expiration Date      Blood Type Barcode 6200     Unit Volume 350 ml   Prepare RBC Once    Collection Time: 24 12:30 AM   Result Value Ref Range    Blood Product [id number]     Unit Number [id number]     UNIT ABO A     UNIT RH POS     Product Status Presumed Transfused     Expiration Date      Blood Type Barcode 6200     Unit Volume 350 ml   Prepare platelets  Once    Collection Time: 03/26/24 12:30 AM   Result Value Ref Range    Blood Product [id number]     Unit Number [id number]     UNIT ABO O     UNIT RH POS     Product Status Presumed Transfused     Expiration Date 202403262359     Blood Type Barcode 5100     Unit Volume 202 ml   POCT Glucose    Collection Time: 03/26/24  4:55 AM   Result Value Ref Range    POC Glucose 166 (H) 70 - 99 mg/dL   Basic Metabolic Panel (8)    Collection Time: 03/26/24  5:13 AM   Result Value Ref Range    Glucose 152 (H) 70 - 99 mg/dL    Sodium 141 136 - 145 mmol/L    Potassium 3.4 (L) 3.5 - 5.1 mmol/L    Chloride 109 98 - 112 mmol/L    CO2 27.0 21.0 - 32.0 mmol/L    Anion Gap 5 0 - 18 mmol/L    BUN 20 9 - 23 mg/dL    Creatinine 1.16 0.70 - 1.30 mg/dL    Calcium, Total 8.8 8.5 - 10.1 mg/dL    Calculated Osmolality 298 (H) 275 - 295 mOsm/kg    eGFR-Cr 67 >=60 mL/min/1.73m2   CBC W/ DIFFERENTIAL    Collection Time: 03/26/24  5:13 AM   Result Value Ref Range    WBC 3.8 (L) 4.0 - 11.0 x10(3) uL    RBC 2.24 (L) 3.80 - 5.80 x10(6)uL    HGB 6.8 (LL) 13.0 - 17.5 g/dL    HCT 20.0 (L) 39.0 - 53.0 %    PLT 31.0 (L) 150.0 - 450.0 10(3)uL    MCV 89.3 80.0 - 100.0 fL    MCH 30.4 26.0 - 34.0 pg    MCHC 34.0 31.0 - 37.0 g/dL    RDW 16.1 %    Neutrophil Absolute Prelim 2.19 1.50 - 7.70 x10 (3) uL    Neutrophil Absolute 2.19 1.50 - 7.70 x10(3) uL    Lymphocyte Absolute 1.21 1.00 - 4.00 x10(3) uL    Monocyte Absolute 0.35 0.10 - 1.00 x10(3) uL    Eosinophil Absolute 0.01 0.00 - 0.70 x10(3) uL    Basophil Absolute 0.00 0.00 - 0.20 x10(3) uL    Immature Granulocyte Absolute 0.04 0.00 - 1.00 x10(3) uL    Neutrophil % 57.6 %    Lymphocyte % 31.8 %    Monocyte % 9.2 %    Eosinophil % 0.3 %    Basophil % 0.0 %    Immature Granulocyte % 1.1 %   Scan slide    Collection Time: 03/26/24  5:13 AM   Result Value Ref Range    Slide Review       Slide reviewed, normal WBC, RBC, and platelet morphology.   Prepare RBC Once    Collection Time: 03/26/24  6:07 AM   Result  Value Ref Range    Blood Product [id number]     Unit Number [id number]     UNIT ABO A     UNIT RH POS     Product Status Issued     Expiration Date 518125149353     Blood Type Barcode 6200     Unit Volume 350 ml   Hemoglobin    Collection Time: 03/26/24 11:15 AM   Result Value Ref Range    HGB 8.1 (L) 13.0 - 17.5 g/dL   POCT Glucose    Collection Time: 03/26/24 11:54 AM   Result Value Ref Range    POC Glucose 182 (H) 70 - 99 mg/dL     Recent Labs   Lab 03/24/24  2221 03/25/24  0611 03/26/24  0513 03/26/24  1115   RBC 2.25* 2.28* 2.24*  --    HGB 6.8* 7.1* 6.8* 8.1*   HCT 20.4* 20.1* 20.0*  --    MCV 90.7 88.2 89.3  --    MCH 30.2 31.1 30.4  --    MCHC 33.3 35.3 34.0  --    RDW 16.5 15.9 16.1  --    NEPRELIM 2.61 2.19 2.19  --    WBC 4.9 3.8* 3.8*  --    PLT 29.0* 20.0* 31.0*  --            CULTURES  No results found for this visit on 03/24/24.    IMAGING:    No results found.      MEDICATIONS:  Medications reviewed.     pantoprazole  40 mg Intravenous Q12H    allopurinol  300 mg Oral Daily    carvedilol  6.25 mg Oral BID with meals    rosuvastatin  10 mg Oral Nightly    insulin aspart  1-5 Units Subcutaneous TID AC and HS      lactated ringers 100 mL/hr at 03/25/24 1415    sodium chloride Stopped (03/24/24 1505)    lactated ringers 100 mL/hr at 03/25/24 0921     glucose **OR** glucose **OR** glucose-vitamin C **OR** dextrose **OR** glucose **OR** glucose **OR** glucose-vitamin C, acetaminophen, melatonin, polyethylene glycol (PEG 3350), sennosides, bisacodyl, fleet enema, ondansetron, metoclopramide, heparin    ASSESSMENT AND PLAN:     Principal Problem:    Gastrointestinal hemorrhage, unspecified gastrointestinal hemorrhage type  Active Problems:    Anemia, unspecified type    Hyponatremia    Anemia    Azotemia    Hyperglycemia    Thrombocytopenia (HCC)        Renato Hall is a 73 year old male with metastatic poorly differentiated metastatic neuroendocrine cancer of the stomach.     Admitted with GI bleed and  pancytopenia     Metastatic poorly differentiated neuroendocrine cancer  On systemic chemotherapy topotecan in the clinic  No treatment while in the hospital     GI bleed  Tumor invading from stomach to pancreas presentation  GI bleed could be from tumor erosions  EGD -3/25/2022 for: Large ulcerated gastric mass  Likely source of bleeding  If he continues to ooze then palliative ration therapy would be an option\     Pancytopenia  Chemotherapy induced myelotoxicity  Neutrophils normal despite low white count  This morning hemoglobin 6.8 and transfusion has been given  Posttransfusion hemoglobin is 8.1  Monitor hemoglobin  Platelet count is 31  Need close monitoring of labs      Will continue to follow while in the hospital           Thank you for allowing me to participate in the care of your patient.     Bacilio Ornelas MD

## 2024-03-26 NOTE — OCCUPATIONAL THERAPY NOTE
OT orders received. Pt reports no concerns with ADLs and functional mobility. Pt declining OT eval this date. OT will sign off.

## 2024-03-26 NOTE — PLAN OF CARE
Pt received alert and oriented x4, denies pain. 1 unit of PRBC transfused without s/s of reaction. Hgb redraw 8.1. Smear of BM appearing greenish brown. Tolerating soft diet. VSS, afebrile. Call light within reach.

## 2024-03-26 NOTE — PLAN OF CARE
Patient alert and oriented x4. VSS. Afebrile. No c/o pain at this time. See MAR. BG coverage provided per sliding scale. IVF infusing. Small BMs. Safety precautions continued. Call light within reach.   0537: Hgb 6.8. MD notified, order for PRBC x1 received   Problem: GASTROINTESTINAL - ADULT  Goal: Maintains or returns to baseline bowel function  Description: INTERVENTIONS:  - Assess bowel function  - Maintain adequate hydration with IV or PO as ordered and tolerated  - Evaluate effectiveness of GI medications  - Encourage mobilization and activity  - Obtain nutritional consult as needed  - Establish a toileting routine/schedule  - Consider collaborating with pharmacy to review patient's medication profile  Outcome: Progressing     Problem: METABOLIC/FLUID AND ELECTROLYTES - ADULT  Goal: Glucose maintained within prescribed range  Description: INTERVENTIONS:  - Monitor Blood Glucose as ordered  - Assess for signs and symptoms of hyperglycemia and hypoglycemia  - Administer ordered medications to maintain glucose within target range  - Assess barriers to adequate nutritional intake and initiate nutrition consult as needed  - Instruct patient on self management of diabetes  Outcome: Progressing

## 2024-03-26 NOTE — PROGRESS NOTES
[facility]  Hospitalist Progress Note                                                                     [facility]   part of [facility]        Renato Hall  5/30/1950    SUBJECTIVE:  Patient seen and examined.  Received 1u PRBC earlier.  Some diarrhea this AM but self limited, denies abd pian.  Denies CP/SOB.  NAD.       OBJECTIVE:  Temp:  [97.5 °F (36.4 °C)-98.3 °F (36.8 °C)] 98.2 °F (36.8 °C)  Pulse:  [67-81] 70  Resp:  [10-18] 18  BP: (107-141)/(47-65) 141/62  SpO2:  [95 %-99 %] 96 %  Exam  Gen: No acute distress, alert and oriented x3, no focal neurologic deficits  Pulm: Lungs clear bilaterally, normal respiratory effort  CV: Heart with regular rate and rhythm, no murmur.  Normal PMI.    Abd: Abdomen soft, nontender, nondistended, no organomegaly, bowel sounds present  MSK: Full range of motion in extremities, no clubbing, no cyanosis  Skin: no rashes or lesions    Labs:   Recent Labs   Lab 03/24/24  1339 03/24/24  1340 03/24/24  2221 03/25/24  0611 03/26/24  0513 03/26/24  1115   WBC 5.5  --  4.9 3.8* 3.8*  --    HGB 5.8*  --  6.8* 7.1* 6.8* 8.1*   MCV 94.1  --  90.7 88.2 89.3  --    PLT 14.0*  --  29.0* 20.0* 31.0*  --    BAND 3  --   --   --   --   --    INR  --  1.15  --   --   --   --        Recent Labs   Lab 03/24/24  1340 03/25/24  0748 03/26/24  0513   * 136 141   K 3.9 3.7 3.4*    104 109   CO2 25.0 28.0 27.0   BUN 32* 29* 20   CREATSERUM 1.26 1.15 1.16   CA 8.9 8.9 8.8   MG  --  1.3*  --    * 173* 152*       Recent Labs   Lab 03/24/24  1340 03/25/24  0748   ALT 16 14*   AST 13* 10*   ALB 2.9* 2.9*       Recent Labs   Lab 03/24/24  2110 03/25/24  0739 03/25/24  1715 03/25/24 2052 03/26/24  0455   PGLU 202* 182* 159* 167* 166*       Meds:   Scheduled:    pantoprazole  40 mg Intravenous Q12H    allopurinol  300 mg Oral Daily    carvedilol  6.25 mg Oral BID with meals    rosuvastatin  10 mg Oral Nightly    insulin  aspart  1-5 Units Subcutaneous TID AC and HS     Continuous Infusions:    lactated ringers 100 mL/hr at 03/25/24 1415    sodium chloride Stopped (03/24/24 1505)    lactated ringers 100 mL/hr at 03/25/24 0921     PRN: glucose **OR** glucose **OR** glucose-vitamin C **OR** dextrose **OR** glucose **OR** glucose **OR** glucose-vitamin C, acetaminophen, melatonin, polyethylene glycol (PEG 3350), sennosides, bisacodyl, fleet enema, ondansetron, metoclopramide, heparin    Assessment/Plan:  Principal Problem:    Gastrointestinal hemorrhage, unspecified gastrointestinal hemorrhage type  Active Problems:    Anemia, unspecified type    Hyponatremia    Anemia    Azotemia    Hyperglycemia    Thrombocytopenia (HCC)    73 year old male with PMH sig for 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 Henning of [facility] oncology, diabetes mellitus type 2, hypertension, hyperlipidemia presented with dark stools and weakness.     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

## 2024-03-26 NOTE — PROGRESS NOTES
Had 1 unit of platelet this am,Tolerated the infusion,Denies any pain or discomfort,Went for Egd and found ulcerated cardia mass  W/ clots,Endoclips x2 was placed,Continue on IVF via rt shivani cath w/ good blood return,Up to bathroom w/ steady  gait,Assisted needs.

## 2024-03-26 NOTE — PROGRESS NOTES
[facility]  Progress Note    Renato Hall Patient Status:  Inpatient    1950 MRN [id number]   Location [facility] 4NW-A Attending Guille Davis*   Hosp Day # 2 PCP Deyanira Resendez MD     Subjective:  Renato Hall is a(n) 73 year old male.Pt no BMs since this AM.  No abd pain NV   ROS: Had FC SOB CP    Objective:  Blood pressure 141/62, pulse 70, temperature 98.2 °F (36.8 °C), temperature source Oral, resp. rate 18, height 5' 8\" (1.727 m), weight 193 lb 14.4 oz (88 kg), SpO2 96%.  Physical Exam:  GEN: well developed, well nourished, NAD    HEENT: non-icteric   LUNGS: CTA  CARDIO: RRR  GI: good BS's,no masses, HSM or tenderness RECTAL: Exam not done. EXTREM.: no edema NEURO: A + O      Labs:   Lab Results   Component Value Date    WBC 3.8 2024    HGB 8.1 2024    HCT 20.0 2024    PLT 31.0 2024    CREATSERUM 1.16 2024    BUN 20 2024     2024    K 3.4 2024     2024    CO2 27.0 2024     2024    CA 8.8 2024    PGLU 166 2024           Assessment:  Patient Active Problem List   Diagnosis    Essential hypertension, benign    Pure hypercholesterolemia    Hyperuricemia    Allergic rhinitis due to pollen    Sensorineural hearing loss, bilateral    Diabetes mellitus type II, non insulin dependent (HCC)    Hypertrophic cardiomyopathy (HCC)    Nasopalatine cyst    Stage 3a chronic kidney disease (HCC)    Rotator cuff tendinitis, right    Hypercholesterolemia with hypertriglyceridemia    BMI 40.0-44.9, adult (HCC)    Gastric mass    Rectal bleeding    Leukocytosis, unspecified type    Anemia, unspecified type    Hemoglobin drop    MDS (myelodysplastic syndrome) (HCC)    Hyponatremia    Anemia    Azotemia    Hyperglycemia    Gastrointestinal hemorrhage, unspecified gastrointestinal hemorrhage type    Thrombocytopenia (HCC)        Renato Hall is a(n) 73 year old male. Pt with gastric mass/malignancy recent  bleed from thrombocytopenia after chemo. Endoclips x 2 placed at 1 bleeding site but mass very friable.  Not amenable to endoscopic therapy. Hopefully bleeding resolves with platelet recovery      Plan:    1.  Cont BID PPI add Carafate QAC  2.  Follow labs.  3.  IR or surgical intervention if sig bleed develops    Total time spent on patient care:  20 minutes    Aris Rod MD  3/26/2024  11:49 AM

## 2024-03-27 VITALS
OXYGEN SATURATION: 97 % | BODY MASS INDEX: 29.38 KG/M2 | RESPIRATION RATE: 16 BRPM | HEIGHT: 68 IN | HEART RATE: 61 BPM | SYSTOLIC BLOOD PRESSURE: 138 MMHG | WEIGHT: 193.88 LBS | DIASTOLIC BLOOD PRESSURE: 75 MMHG | TEMPERATURE: 98 F

## 2024-03-27 LAB
ANION GAP SERPL CALC-SCNC: 6 MMOL/L (ref 0–18)
BASOPHILS # BLD AUTO: 0.01 X10(3) UL (ref 0–0.2)
BASOPHILS NFR BLD AUTO: 0.3 %
BLOOD TYPE BARCODE: 6200
BUN BLD-MCNC: 12 MG/DL (ref 9–23)
CALCIUM BLD-MCNC: 8.7 MG/DL (ref 8.5–10.1)
CHLORIDE SERPL-SCNC: 108 MMOL/L (ref 98–112)
CO2 SERPL-SCNC: 28 MMOL/L (ref 21–32)
CREAT BLD-MCNC: 0.91 MG/DL
EGFRCR SERPLBLD CKD-EPI 2021: 89 ML/MIN/1.73M2 (ref 60–?)
EOSINOPHIL # BLD AUTO: 0.01 X10(3) UL (ref 0–0.7)
EOSINOPHIL NFR BLD AUTO: 0.3 %
ERYTHROCYTE [DISTWIDTH] IN BLOOD BY AUTOMATED COUNT: 15.4 %
GLUCOSE BLD-MCNC: 152 MG/DL (ref 70–99)
GLUCOSE BLD-MCNC: 160 MG/DL (ref 70–99)
GLUCOSE BLD-MCNC: 165 MG/DL (ref 70–99)
HCT VFR BLD AUTO: 22.1 %
HCT VFR BLD AUTO: 24.2 %
HGB BLD-MCNC: 7.9 G/DL
HGB BLD-MCNC: 8.3 G/DL
IMM GRANULOCYTES # BLD AUTO: 0.04 X10(3) UL (ref 0–1)
IMM GRANULOCYTES NFR BLD: 1.3 %
LYMPHOCYTES # BLD AUTO: 0.94 X10(3) UL (ref 1–4)
LYMPHOCYTES NFR BLD AUTO: 31.1 %
MCH RBC QN AUTO: 31.1 PG (ref 26–34)
MCHC RBC AUTO-ENTMCNC: 35.7 G/DL (ref 31–37)
MCV RBC AUTO: 87 FL
MONOCYTES # BLD AUTO: 0.4 X10(3) UL (ref 0.1–1)
MONOCYTES NFR BLD AUTO: 13.2 %
NEUTROPHILS # BLD AUTO: 1.62 X10 (3) UL (ref 1.5–7.7)
NEUTROPHILS # BLD AUTO: 1.62 X10(3) UL (ref 1.5–7.7)
NEUTROPHILS NFR BLD AUTO: 53.8 %
OSMOLALITY SERPL CALC.SUM OF ELEC: 297 MOSM/KG (ref 275–295)
PLATELET # BLD AUTO: 24 10(3)UL (ref 150–450)
PLATELETS.RETICULATED NFR BLD AUTO: 4 % (ref 0–7)
POTASSIUM SERPL-SCNC: 3.3 MMOL/L (ref 3.5–5.1)
RBC # BLD AUTO: 2.54 X10(6)UL
SODIUM SERPL-SCNC: 142 MMOL/L (ref 136–145)
UNIT VOLUME: 350 ML
WBC # BLD AUTO: 3 X10(3) UL (ref 4–11)

## 2024-03-27 PROCEDURE — 85014 HEMATOCRIT: CPT | Performed by: HOSPITALIST

## 2024-03-27 PROCEDURE — 85025 COMPLETE CBC W/AUTO DIFF WBC: CPT | Performed by: HOSPITALIST

## 2024-03-27 PROCEDURE — 85018 HEMOGLOBIN: CPT | Performed by: HOSPITALIST

## 2024-03-27 PROCEDURE — 82962 GLUCOSE BLOOD TEST: CPT

## 2024-03-27 PROCEDURE — C9113 INJ PANTOPRAZOLE SODIUM, VIA: HCPCS | Performed by: INTERNAL MEDICINE

## 2024-03-27 PROCEDURE — 80048 BASIC METABOLIC PNL TOTAL CA: CPT | Performed by: HOSPITALIST

## 2024-03-27 RX ORDER — PANTOPRAZOLE SODIUM 40 MG/1
40 TABLET, DELAYED RELEASE ORAL
Qty: 60 TABLET | Refills: 0 | Status: SHIPPED
Start: 2024-03-27

## 2024-03-27 RX ORDER — PANTOPRAZOLE SODIUM 40 MG/1
40 TABLET, DELAYED RELEASE ORAL
Qty: 60 TABLET | Refills: 0 | Status: SHIPPED | OUTPATIENT
Start: 2024-03-27

## 2024-03-27 RX ORDER — SUCRALFATE 1 G/1
1 TABLET ORAL
Qty: 90 TABLET | Refills: 0 | Status: SHIPPED | OUTPATIENT
Start: 2024-03-27

## 2024-03-27 RX ORDER — POTASSIUM CHLORIDE 20 MEQ/1
40 TABLET, EXTENDED RELEASE ORAL ONCE
Status: COMPLETED | OUTPATIENT
Start: 2024-03-27 | End: 2024-03-27

## 2024-03-27 NOTE — PLAN OF CARE
Patient alert and oriented x4. VSS. Afebrile. No c/o pain at this time. See MAR. BG coverage provided per sliding scale. Soft, formed BM x1. Diarrhea improving. Safety precautions continued. Call light within reach.  Problem: GASTROINTESTINAL - ADULT  Goal: Maintains or returns to baseline bowel function  Description: INTERVENTIONS:  - Assess bowel function  - Maintain adequate hydration with IV or PO as ordered and tolerated  - Evaluate effectiveness of GI medications  - Encourage mobilization and activity  - Obtain nutritional consult as needed  - Establish a toileting routine/schedule  - Consider collaborating with pharmacy to review patient's medication profile  3/26/2024 2204 by Sera Sy, RN  Outcome: Progressing    Problem: METABOLIC/FLUID AND ELECTROLYTES - ADULT  Goal: Glucose maintained within prescribed range  Description: INTERVENTIONS:  - Monitor Blood Glucose as ordered  - Assess for signs and symptoms of hyperglycemia and hypoglycemia  - Administer ordered medications to maintain glucose within target range  - Assess barriers to adequate nutritional intake and initiate nutrition consult as needed  - Instruct patient on self management of diabetes  3/26/2024 2204 by Sera Sy, RN  Outcome: Progressing

## 2024-03-27 NOTE — PROGRESS NOTES
[facility] HEMATOLOGY ONCOLOGY  Progress Note    Renato Hall Patient Status:  Inpatient    1950 MRN [id number]   Location [facility] 4NW-A Attending Guille Davis*   Hosp Day # 3 PCP Deyanira Resendez MD     ADMISSION DATE AND TIME: 3/24/2024  1:09 PM    ADMIT DIAGNOSIS: Thrombocytopenia (HCC) [D69.6]  Gastrointestinal hemorrhage, unspecified gastrointestinal hemorrhage type [K92.2]  Anemia, unspecified type [D64.9]    SUBJECTIVE:    Feeling better  No new issues      OBJECTIVE:  Blood pressure 120/58, pulse 64, temperature 97.9 °F (36.6 °C), temperature source Oral, resp. rate 18, height 5' 8\" (1.727 m), weight 193 lb 14.4 oz (88 kg), SpO2 96%.    PHYSICAL EXAM:  General: afebrile, alert and oriented x 3, pleasant  HEENT: oropharynx clear  CV: Regular rate and rhythm  Lungs: CTA  Abdomen: soft, non distended and non tender  Extremity: no edema or cyanosis       LABS:  Recent Results (from the past 24 hour(s))   POCT Glucose    Collection Time: 24  5:14 PM   Result Value Ref Range    POC Glucose 172 (H) 70 - 99 mg/dL   POCT Glucose    Collection Time: 24  9:09 PM   Result Value Ref Range    POC Glucose 183 (H) 70 - 99 mg/dL   Prepare RBC Once    Collection Time: 24 12:30 AM   Result Value Ref Range    Blood Product [id number]     Unit Number [id number]     UNIT ABO A     UNIT RH POS     Product Status Presumed Transfused     Expiration Date [id number]     Blood Type Barcode 6200     Unit Volume 350 ml   Basic Metabolic Panel (8)    Collection Time: 24  4:24 AM   Result Value Ref Range    Glucose 152 (H) 70 - 99 mg/dL    Sodium 142 136 - 145 mmol/L    Potassium 3.3 (L) 3.5 - 5.1 mmol/L    Chloride 108 98 - 112 mmol/L    CO2 28.0 21.0 - 32.0 mmol/L    Anion Gap 6 0 - 18 mmol/L    BUN 12 9 - 23 mg/dL    Creatinine 0.91 0.70 - 1.30 mg/dL    Calcium, Total 8.7 8.5 - 10.1 mg/dL    Calculated Osmolality 297 (H) 275 - 295 mOsm/kg    eGFR-Cr 89 >=60  mL/min/1.73m2   CBC W/ DIFFERENTIAL    Collection Time: 03/27/24  4:24 AM   Result Value Ref Range    WBC 3.0 (L) 4.0 - 11.0 x10(3) uL    RBC 2.54 (L) 3.80 - 5.80 x10(6)uL    HGB 7.9 (L) 13.0 - 17.5 g/dL    HCT 22.1 (L) 39.0 - 53.0 %    PLT 24.0 (L) 150.0 - 450.0 10(3)uL    Immature Platelet Fraction 4.0 0.0 - 7.0 %    MCV 87.0 80.0 - 100.0 fL    MCH 31.1 26.0 - 34.0 pg    MCHC 35.7 31.0 - 37.0 g/dL    RDW 15.4 %    Neutrophil Absolute Prelim 1.62 1.50 - 7.70 x10 (3) uL    Neutrophil Absolute 1.62 1.50 - 7.70 x10(3) uL    Lymphocyte Absolute 0.94 (L) 1.00 - 4.00 x10(3) uL    Monocyte Absolute 0.40 0.10 - 1.00 x10(3) uL    Eosinophil Absolute 0.01 0.00 - 0.70 x10(3) uL    Basophil Absolute 0.01 0.00 - 0.20 x10(3) uL    Immature Granulocyte Absolute 0.04 0.00 - 1.00 x10(3) uL    Neutrophil % 53.8 %    Lymphocyte % 31.1 %    Monocyte % 13.2 %    Eosinophil % 0.3 %    Basophil % 0.3 %    Immature Granulocyte % 1.3 %   Scan slide    Collection Time: 03/27/24  4:24 AM   Result Value Ref Range    Slide Review       Slide reviewed, normal WBC, RBC, and platelet morphology.   POCT Glucose    Collection Time: 03/27/24  5:12 AM   Result Value Ref Range    POC Glucose 160 (H) 70 - 99 mg/dL   POCT Glucose    Collection Time: 03/27/24 12:03 PM   Result Value Ref Range    POC Glucose 165 (H) 70 - 99 mg/dL   Hemoglobin & Hematocrit    Collection Time: 03/27/24 12:09 PM   Result Value Ref Range    HGB 8.3 (L) 13.0 - 17.5 g/dL    HCT 24.2 (L) 39.0 - 53.0 %     Recent Labs   Lab 03/25/24  0611 03/26/24  0513 03/26/24  1115 03/27/24  0424 03/27/24  1209   RBC 2.28* 2.24*  --  2.54*  --    HGB 7.1* 6.8* 8.1* 7.9* 8.3*   HCT 20.1* 20.0*  --  22.1* 24.2*   MCV 88.2 89.3  --  87.0  --    MCH 31.1 30.4  --  31.1  --    MCHC 35.3 34.0  --  35.7  --    RDW 15.9 16.1  --  15.4  --    NEPRELIM 2.19 2.19  --  1.62  --    WBC 3.8* 3.8*  --  3.0*  --    PLT 20.0* 31.0*  --  24.0*  --            CULTURES  No results found for this visit on  03/24/24.    IMAGING:    No results found.      MEDICATIONS:  Medications reviewed.     sucralfate  1 g Oral TID AC    carvedilol  25 mg Oral BID with meals    pantoprazole  40 mg Intravenous Q12H    allopurinol  300 mg Oral Daily    rosuvastatin  10 mg Oral Nightly    insulin aspart  1-5 Units Subcutaneous TID AC and HS         glucose **OR** glucose **OR** glucose-vitamin C **OR** dextrose **OR** glucose **OR** glucose **OR** glucose-vitamin C, acetaminophen, melatonin, polyethylene glycol (PEG 3350), sennosides, bisacodyl, fleet enema, ondansetron, metoclopramide, heparin    ASSESSMENT AND PLAN:     Principal Problem:    Gastrointestinal hemorrhage, unspecified gastrointestinal hemorrhage type  Active Problems:    Anemia, unspecified type    Hyponatremia    Anemia    Azotemia    Hyperglycemia    Thrombocytopenia (HCC)        Renato Hall is a 73 year old male with metastatic poorly differentiated metastatic neuroendocrine cancer of the stomach.     Admitted with GI bleed and pancytopenia     Metastatic poorly differentiated neuroendocrine cancer  On systemic chemotherapy topotecan in the clinic  No treatment while in the hospital     GI bleed  Tumor invading from stomach to pancreas presentation  GI bleed could be from tumor erosions  EGD -3/25/2022 for: Large ulcerated gastric mass  Likely source of bleeding  If he continues to ooze then palliative ration therapy would be an option\     Pancytopenia  Chemotherapy induced myelotoxicity  Neutrophils normal despite low white count  This morning hemoglobin 6.8 and transfusion has been given  Posttransfusion hemoglobin is 8.1- 7.9-8.3  Monitor hemoglobin  Platelet count is 24    No longer bleeding   OK to DC with Follow up with Dr Henning           Thank you for allowing me to participate in the care of your patient.     Bacilio Ornelas MD

## 2024-03-27 NOTE — PLAN OF CARE
NURSING DISCHARGE NOTE    Discharged Home via Wheelchair.  Accompanied by RN  Belongings Taken by patient/family.    Discharge instructions given and explained to patient and family, verbalized understanding. PAC de-accessed per protocol. Educated on low fiber soft diet and importance of follow up appointment. All questions answered.

## 2024-03-27 NOTE — DISCHARGE SUMMARY
[facility] Hospitalist Discharge Summary     Patient ID:  Renato Hall  73 year old  5/30/1950    Admit date: 3/24/2024    Discharge date and time: 03/27/24     Attending Physician: Guille Davis*     Primary Care Physician: Deyanira Resendez MD     Discharge Diagnoses: Thrombocytopenia (HCC) [D69.6]  Gastrointestinal hemorrhage, unspecified gastrointestinal hemorrhage type [K92.2]  Anemia, unspecified type [D64.9]    Please note that only IHP DMG and EMG patients enrolled in the Medicare ACO, BCBS ACO and [facility] HMOs will be handled by the [facility] Care Management team.  For all other patients, please follow usual protocol for discharge care transition.    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 Henning of [facility] oncology, diabetes mellitus type 2, hypertension, hyperlipidemia presented with dark stools and weakness.  He reports noticing some very dark brown stools last night.  He is also been feeling very weak and tired and wiped out.  He has a history of nosebleeds and he knows that his weakness was due to anemia.  Given the ongoing symptoms he came to the ER. Of note, last chemotherapy was on 3/14.  In the ER his initial BP was 94/61.  Labs significant for sodium of 134, hemoglobin of 5.8 and platelets of 14.  He was ordered 2 units of blood.  GI was consulted for positive stool guaiac.  Oncology also consulted.  Admitted for further evaluation and treatment.    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     Current Discharge Medication List        START taking these medications    Details   sucralfate 1 g Oral Tab Take 1 tablet (1 g total) by mouth 3 (three) times daily before meals.           CONTINUE these medications which have CHANGED    Details   !! pantoprazole 40 MG Oral Tab EC Take 1 tablet (40 mg total) by mouth 2 (two) times daily before meals.      !! pantoprazole 40 MG Oral Tab EC Take 1 tablet (40 mg total) by mouth 2 (two) times daily before meals.       !! - Potential duplicate medications found. Please discuss with provider.        CONTINUE these medications which have NOT CHANGED    Details   rosuvastatin 10 MG Oral Tab Take 1 tablet (10 mg total) by mouth daily.      allopurinol 300 MG Oral Tab Take 1 tablet (300 mg total) by mouth daily.      ondansetron 4 MG Oral Tablet Dispersible Take 1 tablet (4 mg total) by mouth every 8 (eight) hours as needed for Nausea.      metFORMIN HCl ER (GLUCOPHAGE XR) 500 MG Oral Tablet 24 Hr Take 1 tablet (500 mg total) by mouth daily with breakfast.      indapamide 1.25 MG Oral Tab Take 1 tablet (1.25 mg total) by mouth every morning.      carvedilol 25 MG Oral Tab Take 1 tablet (25 mg total) by mouth 2 (two) times daily with meals.      Fenofibrate 54 MG Oral Tab Take 1 tablet (54 mg total) by mouth daily. with food       Microlet Lancets Does not apply Misc Test daily      !! Glucose Blood (CONTOUR NEXT TEST) In Vitro Strip Test blood sugar daily      !! Glucose Blood In Vitro Strip ONE EVERY DAY      Fexofenadine HCl (ALLEGRA OR) Take 1 tablet by mouth daily.       !! - Potential duplicate medications found. Please discuss with provider.        STOP taking these medications       HYDROcodone-acetaminophen (NORCO) 5-325 MG Oral Tab        Benazepril HCl 20 MG Oral Tab              Activity: activity as tolerated  Diet: regular diet  Wound Care: as directed  Code Status: Prior      Discharge Exam:     General: no acute distress, alert and oriented x 3  Heart: RRR  Lungs: clear bilaterally, no active wheezing  Abdomen: nontender, nondistended, intact BS  Extremities: no pedal edema   Neuro: CN inact, no focal deficits      Total time coordinating care for discharge: Greater than 30 minutes    Hemant Davis MD  [facility]

## 2024-03-27 NOTE — PROGRESS NOTES
[facility]  Progress Note    Renato Hall Patient Status:  Inpatient    1950 MRN [id number]   Location [facility] 4NW-A Attending Guille Davis*   Hosp Day # 3 PCP Deyanira Resendez MD     Subjective:  Renato Hall is a(n) 73 year old male.Pt reports dark stool non melenic.  No abd pain pain NVrni  ROS: No FC SOB    Objective:  Blood pressure 120/58, pulse 64, temperature 97.9 °F (36.6 °C), temperature source Oral, resp. rate 18, height 5' 8\" (1.727 m), weight 193 lb 14.4 oz (88 kg), SpO2 96%.  Physical Exam:  GEN: well developed, well nourished, NAD  GI: good BS's,no masses, HSM or tenderness RECTAL: Exam not done. EXTREM.: no edema NEURO: A + O      Labs:   Lab Results   Component Value Date    WBC 3.0 2024    HGB 7.9 2024    HCT 22.1 2024    PLT 24.0 2024    CREATSERUM 0.91 2024    BUN 12 2024     2024    K 3.3 2024     2024    CO2 28.0 2024     2024    CA 8.7 2024    PGLU 160 2024           Assessment:  Patient Active Problem List   Diagnosis    Essential hypertension, benign    Pure hypercholesterolemia    Hyperuricemia    Allergic rhinitis due to pollen    Sensorineural hearing loss, bilateral    Diabetes mellitus type II, non insulin dependent (HCC)    Hypertrophic cardiomyopathy (HCC)    Nasopalatine cyst    Stage 3a chronic kidney disease (HCC)    Rotator cuff tendinitis, right    Hypercholesterolemia with hypertriglyceridemia    BMI 40.0-44.9, adult (HCC)    Gastric mass    Rectal bleeding    Leukocytosis, unspecified type    Anemia, unspecified type    Hemoglobin drop    MDS (myelodysplastic syndrome) (HCC)    Hyponatremia    Anemia    Azotemia    Hyperglycemia    Gastrointestinal hemorrhage, unspecified gastrointestinal hemorrhage type    Thrombocytopenia (HCC)        Renato Hall is a(n) 73 year old male. Pt with gastric cancer s/p bleed. Persistently low platelets exacerbating  bleedign.  EGD endoxlips placed. Hb stable.      Plan:    1.  Cont BID PPI, Carafate QAC.  2.  OK to DC per Onc    Total time spent on patient care:  17 minutes    Aris Rod MD  3/27/2024  7:50 AM

## 2024-03-27 NOTE — PROGRESS NOTES
Resumed care at 2300, Pt A&Ox 4 on room air, VSS. Monitoring hgb levels currently 8.1. Tolerating medications well per mar. Call light within reach, frequent checks made, needs met.

## 2024-04-05 ENCOUNTER — APPOINTMENT (OUTPATIENT)
Dept: MRI IMAGING | Facility: HOSPITAL | Age: 74
End: 2024-04-05
Attending: EMERGENCY MEDICINE
Payer: MEDICARE

## 2024-04-05 ENCOUNTER — HOSPITAL ENCOUNTER (INPATIENT)
Facility: HOSPITAL | Age: 74
LOS: 2 days | Discharge: HOME HEALTH | End: 2024-04-07
Attending: EMERGENCY MEDICINE | Admitting: HOSPITALIST
Payer: MEDICARE

## 2024-04-05 ENCOUNTER — APPOINTMENT (OUTPATIENT)
Dept: CT IMAGING | Facility: HOSPITAL | Age: 74
End: 2024-04-05
Attending: EMERGENCY MEDICINE
Payer: MEDICARE

## 2024-04-05 ENCOUNTER — APPOINTMENT (OUTPATIENT)
Dept: GENERAL RADIOLOGY | Facility: HOSPITAL | Age: 74
End: 2024-04-05
Attending: EMERGENCY MEDICINE
Payer: MEDICARE

## 2024-04-05 DIAGNOSIS — R26.81 GAIT INSTABILITY: ICD-10-CM

## 2024-04-05 DIAGNOSIS — R47.81 SLURRED SPEECH: Primary | ICD-10-CM

## 2024-04-05 LAB
ALBUMIN SERPL-MCNC: 3.1 G/DL (ref 3.4–5)
ALBUMIN/GLOB SERPL: 0.9 {RATIO} (ref 1–2)
ALP LIVER SERPL-CCNC: 57 U/L
ALT SERPL-CCNC: 16 U/L
ANION GAP SERPL CALC-SCNC: 6 MMOL/L (ref 0–18)
APTT PPP: 25.9 SECONDS (ref 23.3–35.6)
AST SERPL-CCNC: 45 U/L (ref 15–37)
ATRIAL RATE: 63 BPM
BASOPHILS # BLD AUTO: 0.03 X10(3) UL (ref 0–0.2)
BASOPHILS NFR BLD AUTO: 0.5 %
BILIRUB SERPL-MCNC: 0.5 MG/DL (ref 0.1–2)
BUN BLD-MCNC: 9 MG/DL (ref 9–23)
CALCIUM BLD-MCNC: 9.7 MG/DL (ref 8.5–10.1)
CHLORIDE SERPL-SCNC: 101 MMOL/L (ref 98–112)
CO2 SERPL-SCNC: 27 MMOL/L (ref 21–32)
CREAT BLD-MCNC: 1.2 MG/DL
EGFRCR SERPLBLD CKD-EPI 2021: 64 ML/MIN/1.73M2 (ref 60–?)
EOSINOPHIL # BLD AUTO: 0.01 X10(3) UL (ref 0–0.7)
EOSINOPHIL NFR BLD AUTO: 0.2 %
ERYTHROCYTE [DISTWIDTH] IN BLOOD BY AUTOMATED COUNT: 17.2 %
GLOBULIN PLAS-MCNC: 3.6 G/DL (ref 2.8–4.4)
GLUCOSE BLD-MCNC: 155 MG/DL (ref 70–99)
GLUCOSE BLD-MCNC: 188 MG/DL (ref 70–99)
GLUCOSE BLD-MCNC: 222 MG/DL (ref 70–99)
HCT VFR BLD AUTO: 29.7 %
HGB BLD-MCNC: 9.9 G/DL
IMM GRANULOCYTES # BLD AUTO: 0.02 X10(3) UL (ref 0–1)
IMM GRANULOCYTES NFR BLD: 0.3 %
INR BLD: 1 (ref 0.8–1.2)
INR BLD: 1.11 (ref 0.8–1.2)
LYMPHOCYTES # BLD AUTO: 0.72 X10(3) UL (ref 1–4)
LYMPHOCYTES NFR BLD AUTO: 10.9 %
MCH RBC QN AUTO: 30.9 PG (ref 26–34)
MCHC RBC AUTO-ENTMCNC: 33.3 G/DL (ref 31–37)
MCV RBC AUTO: 92.8 FL
MONOCYTES # BLD AUTO: 0.62 X10(3) UL (ref 0.1–1)
MONOCYTES NFR BLD AUTO: 9.4 %
NEUTROPHILS # BLD AUTO: 5.21 X10 (3) UL (ref 1.5–7.7)
NEUTROPHILS # BLD AUTO: 5.21 X10(3) UL (ref 1.5–7.7)
NEUTROPHILS NFR BLD AUTO: 78.7 %
OSMOLALITY SERPL CALC.SUM OF ELEC: 284 MOSM/KG (ref 275–295)
P AXIS: 35 DEGREES
P-R INTERVAL: 200 MS
PLATELET # BLD AUTO: 204 10(3)UL (ref 150–450)
PLATELETS.RETICULATED NFR BLD AUTO: 3.9 % (ref 0–7)
POTASSIUM SERPL-SCNC: 4.3 MMOL/L (ref 3.5–5.1)
PROT SERPL-MCNC: 6.7 G/DL (ref 6.4–8.2)
PROTHROMBIN TIME: 13.2 SECONDS (ref 11.6–14.8)
PROTHROMBIN TIME: 14.3 SECONDS (ref 11.6–14.8)
Q-T INTERVAL: 464 MS
QRS DURATION: 134 MS
QTC CALCULATION (BEZET): 474 MS
R AXIS: -60 DEGREES
RBC # BLD AUTO: 3.2 X10(6)UL
SODIUM SERPL-SCNC: 134 MMOL/L (ref 136–145)
T AXIS: 74 DEGREES
VENTRICULAR RATE: 63 BPM
WBC # BLD AUTO: 6.6 X10(3) UL (ref 4–11)

## 2024-04-05 PROCEDURE — 99223 1ST HOSP IP/OBS HIGH 75: CPT | Performed by: OTHER

## 2024-04-05 PROCEDURE — 71045 X-RAY EXAM CHEST 1 VIEW: CPT | Performed by: EMERGENCY MEDICINE

## 2024-04-05 PROCEDURE — 70450 CT HEAD/BRAIN W/O DYE: CPT | Performed by: EMERGENCY MEDICINE

## 2024-04-05 PROCEDURE — 70553 MRI BRAIN STEM W/O & W/DYE: CPT | Performed by: EMERGENCY MEDICINE

## 2024-04-05 RX ORDER — CARVEDILOL 12.5 MG/1
25 TABLET ORAL 2 TIMES DAILY WITH MEALS
Status: DISCONTINUED | OUTPATIENT
Start: 2024-04-05 | End: 2024-04-07

## 2024-04-05 RX ORDER — ONDANSETRON 2 MG/ML
4 INJECTION INTRAMUSCULAR; INTRAVENOUS EVERY 6 HOURS PRN
Status: DISCONTINUED | OUTPATIENT
Start: 2024-04-05 | End: 2024-04-07

## 2024-04-05 RX ORDER — ROSUVASTATIN CALCIUM 10 MG/1
10 TABLET, COATED ORAL NIGHTLY
Status: DISCONTINUED | OUTPATIENT
Start: 2024-04-05 | End: 2024-04-07

## 2024-04-05 RX ORDER — NICOTINE POLACRILEX 4 MG
30 LOZENGE BUCCAL
Status: DISCONTINUED | OUTPATIENT
Start: 2024-04-05 | End: 2024-04-07

## 2024-04-05 RX ORDER — SUCRALFATE 1 G/1
1 TABLET ORAL
Status: DISCONTINUED | OUTPATIENT
Start: 2024-04-05 | End: 2024-04-07

## 2024-04-05 RX ORDER — NICOTINE POLACRILEX 4 MG
15 LOZENGE BUCCAL
Status: DISCONTINUED | OUTPATIENT
Start: 2024-04-05 | End: 2024-04-07

## 2024-04-05 RX ORDER — GADOTERATE MEGLUMINE 376.9 MG/ML
20 INJECTION INTRAVENOUS
Status: COMPLETED | OUTPATIENT
Start: 2024-04-05 | End: 2024-04-05

## 2024-04-05 RX ORDER — HEPARIN SODIUM 5000 [USP'U]/ML
5000 INJECTION, SOLUTION INTRAVENOUS; SUBCUTANEOUS EVERY 12 HOURS SCHEDULED
Status: DISCONTINUED | OUTPATIENT
Start: 2024-04-05 | End: 2024-04-05

## 2024-04-05 RX ORDER — DEXTROSE MONOHYDRATE 25 G/50ML
50 INJECTION, SOLUTION INTRAVENOUS
Status: DISCONTINUED | OUTPATIENT
Start: 2024-04-05 | End: 2024-04-07

## 2024-04-05 RX ORDER — PANTOPRAZOLE SODIUM 40 MG/1
40 TABLET, DELAYED RELEASE ORAL
Status: DISCONTINUED | OUTPATIENT
Start: 2024-04-05 | End: 2024-04-07

## 2024-04-05 RX ORDER — FENOFIBRATE 67 MG/1
67 CAPSULE ORAL NIGHTLY
Status: DISCONTINUED | OUTPATIENT
Start: 2024-04-05 | End: 2024-04-07

## 2024-04-05 RX ORDER — ACETAMINOPHEN 500 MG
500 TABLET ORAL EVERY 4 HOURS PRN
Status: DISCONTINUED | OUTPATIENT
Start: 2024-04-05 | End: 2024-04-07

## 2024-04-05 RX ORDER — ONDANSETRON 4 MG/1
4 TABLET, ORALLY DISINTEGRATING ORAL EVERY 8 HOURS PRN
Status: DISCONTINUED | OUTPATIENT
Start: 2024-04-05 | End: 2024-04-07

## 2024-04-05 RX ORDER — ALLOPURINOL 100 MG/1
300 TABLET ORAL DAILY
Status: DISCONTINUED | OUTPATIENT
Start: 2024-04-05 | End: 2024-04-07

## 2024-04-05 RX ORDER — METOCLOPRAMIDE HYDROCHLORIDE 5 MG/ML
10 INJECTION INTRAMUSCULAR; INTRAVENOUS EVERY 8 HOURS PRN
Status: DISCONTINUED | OUTPATIENT
Start: 2024-04-05 | End: 2024-04-07

## 2024-04-05 NOTE — ED INITIAL ASSESSMENT (HPI)
Pt in with weakness, blurry vision and slurred speech that first began Friday. Clear speech on arrival. No headache and states blurry vision in intermittent.  No falls  No thinners

## 2024-04-05 NOTE — SLP NOTE
ADULT SWALLOWING EVALUATION    ASSESSMENT    ASSESSMENT/OVERALL IMPRESSION:  Patient seen for swallowing evaluation as he failed RN dysphagia screen due to slurred speech. Patient admitted due to weakness, blurred vision and slurred speech. Patient wife at bedside and supportive throughout.  Patient states slurred speech began Tuesday evening and has progressed since. He is obviously dysarthric but is able to recognize and correct articulation errors. His speech is quite deliberate and he is able to express himself effectively though with increased effort. He denied any difficulty with chewing or swallowing since onset of dysarthria.     Oral mechanism exam unremarkable for any unilateral reduction in strength, tone or ROM of facial, lingual or labial musculature. Patient able to self present thin liquids and hard solids with intact oral retrieval and containment. Oral prep and transit were functional. Mastication functional with complete oral clearance. Laryngeal excursion judged to be of adequate strength and timeliness to palpation. There were no overt signs of aspiration noted.    Patient appropriate to continue with regular consistency solids and thin liquids observing aspiration precautions. Discussed medication administration as tolerated, considering patient may struggle with dual consistencies (pill with water) may adjust to whole with puree as needed. SLP will continue to follow for ongoing assessment of po tolerance and completion of communication assessment as workup is ongoing. Reinforced with patient importance of continuing to implement compensatory articulation techniques as he was implementing independently during my visit. Discussed above with patient, wife and RN who were in agreement.     Greer Assessment of Swallow Function Score: No abnormality detected (198)    RECOMMENDATIONS   Diet Recommendations - Solids: Regular  Diet Recommendations - Liquids: Thin Liquids                            Aspiration Precautions: Upright position;Slow rate;Small bites and sips  Medication Administration Recommendations:  (as tolerated)  Treatment Plan/Recommendations: Aspiration precautions;Communication evaluation    HISTORY   MEDICAL HISTORY  Reason for Referral: RN dysphagia screen    Problem List  Principal Problem:    Slurred speech  Active Problems:    Gait instability      Past Medical History  Past Medical History:   Diagnosis Date    Abnormal screening CT of heart 03/31/2005    calcium score 6    Abnormal screening CT of heart 02/09/2010    calcium score of 18    Abnormal screening CT of heart 04/21/2015    calcium score 53    Adenomatous colon polyp 08/27/2014    Allergic rhinitis due to pollen     BPH (benign prostatic hyperplasia)     Cancer (HCC)     Neuroendocrine CA/Pancreatic    Cardiomyopathy (HCC)     2D Echo 9/22/23    COVID 08/2022    No hospitalization. Harsh coughing    Degeneration of cervical intervertebral disc     Diverticula of colon     Essential hypertension, benign     GOUT     High blood pressure     High cholesterol     History of blood transfusion     No reaction    Hypertrophic cardiomyopathy (HCC)     Personal history of antineoplastic chemotherapy 12/05/2023    Last treatment    Pure hypercholesterolemia     Tinnitus, bilateral     Type II or unspecified type diabetes mellitus without mention of complication, not stated as uncontrolled     Visual impairment     Glasses       Prior Living Situation: Home with spouse  Diet Prior to Admission: Regular;Thin liquids  Precautions: Aspiration;Seizure    Patient/Family Goals: to get better    SWALLOWING HISTORY  Current Diet Consistency: Regular;Thin liquids  Dysphagia History: denied  Imaging Results:   MRI Brain from 4/5/24 revealed:  CONCLUSION:       1. No acute intracranial abnormality identified.      2. 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  The largest focal metastatic lesion is in the high left  frontal lobe measuring up to 12 x 9 mm on image 163 series 11 with mild associated edema. A cortically based metastatic   lesion in the right parietal lobe on image 172 series 11 measures 5 x 4 mm which also demonstrates gradient susceptibility which may be due to underlying calcification and/or blood products.  A nodular focus of metastatic disease along the right   occipital lobe is noted on image 117 series 11 measuring up to 4 x 3 mm.  A nodular focus of leptomeningeal metastatic disease along the right occipital lobe on image 103 series 11 measures up to 5 x 3 mm.  Metastatic disease along the cortex of the   posterior left temporal lobe is best seen on image 80 series 11 measuring up to 7 x 5 mm. 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       3. Minimal chronic microvascular ischemic changes in the cerebral white matter.      Please see above for further details.      LOCATION:  [id number]            Dictated by (CST): Floyd Pagan MD on 4/05/2024 at 2:12 PM       Finalized by (CST): Floyd Pagan MD on 4/05/2024 at 2:23 PM       SUBJECTIVE       OBJECTIVE   ORAL MOTOR EXAMINATION  Dentition: Functional  Symmetry: Within Functional Limits  Strength:  (reduced lingual)  Tone: Within Functional Limits  Range of Motion: Within Functional Limits  Rate of Motion: Reduced       Respiratory Status: Unlabored  Consistencies Trialed: Thin liquids;Hard solid  Method of Presentation: Self presentation;Straw;Consecutive swallows  Patient Positioning: Upright;Midline (in bed)    Oral Phase of Swallow: Within Functional Limits                      Pharyngeal Phase of Swallow: Within Functional Limits           (Please note: Silent  aspiration cannot be evaluated clinically. Videofluoroscopic Swallow Study is required to rule-out silent aspiration.)    Esophageal Phase of Swallow: No complaints consistent with possible esophageal involvement                GOALS  Goal #1 The patient will tolerate regular consistency and thin liquids without overt signs or symptoms of aspiration with 100 % accuracy over 1-2 session(s).  In Progress   Goal #2 The patient/family/caregiver will demonstrate understanding and implementation of aspiration precautions and swallow strategies independently over 1-2 session(s).    In Progress   Goal #3 Patient will participate in motor speech evaluation  In Progress     FOLLOW UP  Treatment Plan/Recommendations: Aspiration precautions;Communication evaluation  Number of Visits to Meet Established Goals: 2  Follow Up Needed (Documentation Required): Yes  SLP Follow-up Date: 04/08/24    Thank you for your referral.   If you have any questions, please contact Singh Perez MS CCC-SLP  Pager [phone]

## 2024-04-05 NOTE — CONSULTS
Neurology H&P    Renato Hall Patient Status:  Inpatient    1950 MRN [id number]   Formerly [facility] 3NE-A Attending Jose Matt MD   Hosp Day # 0 PCP Deyanira Resendez MD     Subjective:  Renato Hall is a(n) 73 year old male with a past medical history significant for HTN, HL, hypertrophic cardiomyopathy, diabetes type 2, sensorineural hearing loss, GI bleed, rectal bleed, myelodysplastic syndrome, thrombocytopenia and anemia, presented to the emergency room with a week of feeling off balance, just not feeling well, and slightly slurred speech.  He states that he was just released from the hospital last week.  He states that he had a follow-up with his primary care physician.  The day after being released in the hospital he states he started to feel unwell.  By this he means he states that he felt a little bit unsteady on his feet or \"wobbly\".  He did go get a blood test to see if his anemia had worsened.  He states that this feeling of unsteadiness and uneasiness progressed throughout the next couple days.  He also started noticing that he was having more thick or slurred speech.  He made an appoint with his primary care physician who ordered some basic blood work and found that he was little hypokalemic and dehydrated and started a potassium supplement along with hydration.  He states that this did not help in any way and his symptoms just seem to worsen so as of today feeling that his symptoms of unsteadiness and slurred speech were getting worse decided to come to the emergency room.  Denies any vision changes or focal weakness numbness or tingling.  He denies any falls..    Current Medications:   allopurinol  300 mg Oral Daily    carvedilol  25 mg Oral BID with meals    fenofibrate micronized  67 mg Oral Nightly    pantoprazole  40 mg Oral BID AC    rosuvastatin  10 mg Oral Nightly    sucralfate  1 g Oral TID AC    heparin  5,000 Units Subcutaneous 2 times per day    insulin aspart  1-5 Units  Subcutaneous TID AC and HS       No current outpatient medications on file.       Problem List:  Patient Active Problem List   Diagnosis    Essential hypertension, benign    Pure hypercholesterolemia    Hyperuricemia    Allergic rhinitis due to pollen    Sensorineural hearing loss, bilateral    Diabetes mellitus type II, non insulin dependent (HCC)    Hypertrophic cardiomyopathy (HCC)    Nasopalatine cyst    Stage 3a chronic kidney disease (HCC)    Rotator cuff tendinitis, right    Hypercholesterolemia with hypertriglyceridemia    BMI 40.0-44.9, adult (HCC)    Gastric mass    Rectal bleeding    Leukocytosis, unspecified type    Anemia, unspecified type    Hemoglobin drop    MDS (myelodysplastic syndrome) (HCC)    Hyponatremia    Anemia    Azotemia    Hyperglycemia    Gastrointestinal hemorrhage, unspecified gastrointestinal hemorrhage type    Thrombocytopenia (HCC)    Slurred speech    Gait instability       PMHx:  Past Medical History:   Diagnosis Date    Abnormal screening CT of heart 03/31/2005    calcium score 6    Abnormal screening CT of heart 02/09/2010    calcium score of 18    Abnormal screening CT of heart 04/21/2015    calcium score 53    Adenomatous colon polyp 08/27/2014    Allergic rhinitis due to pollen     BPH (benign prostatic hyperplasia)     Cancer (HCC)     Neuroendocrine CA/Pancreatic    Cardiomyopathy (HCC)     2D Echo 9/22/23    COVID 08/2022    No hospitalization. Harsh coughing    Degeneration of cervical intervertebral disc     Diverticula of colon     Essential hypertension, benign     GOUT     High blood pressure     High cholesterol     History of blood transfusion     No reaction    Hypertrophic cardiomyopathy (HCC)     Personal history of antineoplastic chemotherapy 12/05/2023    Last treatment    Pure hypercholesterolemia     Tinnitus, bilateral     Type II or unspecified type diabetes mellitus without mention of complication, not stated as uncontrolled     Visual impairment      Glasses       PSHx:  Past Surgical History:   Procedure Laterality Date    CHOLECYSTECTOMY  1988    COLONOSCOPY  2008    tiny polyps    COLONOSCOPY  2014    Procedure: COLONOSCOPY;  Surgeon: Liang Quevedo MD;  Location:  ENDOSCOPY    COLONOSCOPY N/A 2017    Procedure: COLONOSCOPY;  Surgeon: Liang Quevedo MD;  Location:  ENDOSCOPY    COLONOSCOPY      PERIPHERAL VASCULAR SCREENING HISTORICAL CONV Bilateral 2017    mild carotid narrowing, PAD screen    UPPER GI ENDOSCOPY,EXAM      VASCULAR LAB - [facility] Bilateral 2011    PAD screening normal       SocHx:  Social History     Socioeconomic History    Marital status:      Spouse name: Dayan    Number of children: 2    Years of education: 12   Occupational History    Occupation:      Comment: self employed   Tobacco Use    Smoking status: Former     Packs/day: 1.00     Years: 17.00     Additional pack years: 0.00     Total pack years: 17.00     Types: Cigarettes     Quit date: 1986     Years since quittin.2    Smokeless tobacco: Never   Vaping Use    Vaping Use: Never used   Substance and Sexual Activity    Alcohol use: No     Alcohol/week: 0.0 standard drinks of alcohol    Drug use: No    Sexual activity: Yes     Partners: Female   Other Topics Concern     Service Yes     Comment: [employer]     Blood Transfusions No    Caffeine Concern Yes     Comment: coffee 1 cup a day    Occupational Exposure Yes     Comment: asbestos    Hobby Hazards No    Sleep Concern No    Stress Concern No    Weight Concern Yes    Special Diet No    Back Care No    Exercise Yes     Comment: walk    Bike Helmet No    Seat Belt Yes    Self-Exams No   Social History Narrative    Lives with wife, and son    Enjoys [employer]     Social Determinants of Health     Food Insecurity: No Food Insecurity (2024)    Food Insecurity     Food Insecurity: Never true   Transportation Needs: No Transportation Needs (2024)     Transportation Needs     Lack of Transportation: No   Housing Stability: Low Risk  (4/5/2024)    Housing Stability     Housing Instability: No       Family History:  Family History   Problem Relation Age of Onset    Arthritis Father         TKA    Hypertension Father     Diabetes Father     Obesity Father     Cancer Mother 70        colon, ?uterine    Genito-Urinary Disorder Mother         hysterectomy    Substance Abuse Son     Asthma Son     High Cholesterol Son     Gastro-Intestinal Disorder Brother         colon polyps, GERD    Hypertension Brother     Neurological Disorder Daughter         MS    Diabetes Daughter     Hypertension Sister     Diabetes Sister     Lipids Sister            ROS:  10 point ROS completed and was negative, except for pertinent positive and negatives stated in subjective.    Objective/Physical Exam:    Vital Signs:  Blood pressure 123/69, pulse 60, temperature 98 °F (36.7 °C), resp. rate 12, height 67\", weight 195 lb (88.5 kg), SpO2 98%.    Gen: Awake and in no apparent distress  HEENT: moist mucus membranes  Neck: Supple  Cardiovascular: Regular rate and rhythm, no murmur  Pulm: CTAB  GI: non-tender, normal bowel sounds  Skin: normal, dry  Extremities: No clubbing or cyanosis      Neurologic:   MENTAL STATUS: alert, ox3, normal attention, language and fund of knowledge.      CRANIAL NERVES II to XII: PERRLA, no ptosis or diplopia, EOM intact, facial sensation intact, strong eye closure, face is symmetric, + dysarthria, tongue midline,  no tongue fasciculations or atrophy, strong shoulder shrug.    MOTOR EXAMINATION: normal tone, no fasciculations, normal strength throughout in UEs and LEs      SENSORY EXAMINATION:  UE: intact to light touch, pinprick intact  LE: intact to light touch, pinprick intact    COORDINATION:  No dysmetria, or intention tremors, HTS intact      GAIT: deferred        Labs:  Lab Results   Component Value Date    WBC 6.6 04/05/2024    HGB 9.9 04/05/2024    HCT 29.7  04/05/2024    .0 04/05/2024    CREATSERUM 1.20 04/05/2024    BUN 9 04/05/2024     04/05/2024    K 4.3 04/05/2024     04/05/2024    CO2 27.0 04/05/2024     04/05/2024    CA 9.7 04/05/2024    ALB 3.1 04/05/2024    ALKPHO 57 04/05/2024    BILT 0.5 04/05/2024    TP 6.7 04/05/2024    AST 45 04/05/2024    ALT 16 04/05/2024    PTT 25.9 04/05/2024    INR 1.11 04/05/2024    PTP 14.3 04/05/2024    PGLU 155 04/05/2024       Imaging:  MRI Brain ww/o 4/5/24  CONCLUSION:       1. No acute intracranial abnormality identified.      2. 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  The largest focal metastatic lesion is in the high left frontal lobe measuring up to 12 x 9 mm on image 163 series 11 with mild associated edema. A cortically based metastatic   lesion in the right parietal lobe on image 172 series 11 measures 5 x 4 mm which also demonstrates gradient susceptibility which may be due to underlying calcification and/or blood products.  A nodular focus of metastatic disease along the right   occipital lobe is noted on image 117 series 11 measuring up to 4 x 3 mm.  A nodular focus of leptomeningeal metastatic disease along the right occipital lobe on image 103 series 11 measures up to 5 x 3 mm.  Metastatic disease along the cortex of the   posterior left temporal lobe is best seen on image 80 series 11 measuring up to 7 x 5 mm. 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       3. Minimal chronic microvascular ischemic changes in the cerebral white matter.     Assessment:  This is a 73-year-old man with multiple medical problems and recent hospitalization for GI bleed, neuroendocrine tumor of  the stomach, and anemia.  Presented to the emergency room with 1 week progressively worsening slurred speech and feelings of unsteadiness gait and balance.  MRI was done showing enhancement of the leptomeninges and a left frontal lobe enhancing lesion.  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  At this point time he has no focal weakness or numbness or tingling on exam.  He does have mild dysarthria.  SLP on consult.  He needs an oncology consult as well.  LP and/or further evaluation of the intracranial disease will be deferred to oncology.  He for gait did not show any stroke or bleed.  Antiplatelet due to recent GI bleed.  Leptomeningeal disease which is present around the entire area of the cerebellum may explain his feelings of unsteadiness and potentially also slurred speech.      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

## 2024-04-05 NOTE — ED QUICK NOTES
Orders for admission, patient is aware of plan and ready to go upstairs. Any questions, please call ED RN Pearl at extension [phone].     Patient Covid vaccination status: Fully vaccinated     COVID Test Ordered in ED: None    COVID Suspicion at Admission: N/A    Running Infusions:  None    Mental Status/LOC at time of transport: Alert, oriented X4.    Other pertinent information:   CIWA score: N/A   NIH score:  1

## 2024-04-05 NOTE — H&P
[facility]   part of [facility]    History and Physical     Renato Hall Patient Status:  Inpatient    1950 MRN [id number]   Location [facility] 3NE-A Attending Jose Matt MD   Hosp Day # 0 PCP Deyanira Resendez MD     Chief Complaint: Slurred speech and difficulty walking.    History of Present Illness: Renato Hall is a 73 year old male with history of BPH, neuroendocrine cancer, cardiomyopathy, hypertension, gout, hyperlipidemia, type 2 diabetes presenting with slurred speech and difficulty walking.  Appears the patient was recently hospitalized for GI bleed.  Patient received 4 units of packed red blood cells and eventually had stapling of an area in his stomach that was bleeding.  Patient says he went home and then the following day he noticed having some difficulty ambulating.  He says this progressively worsened over the last few days until he came in today.  He says predominantly he has difficulty with walking straight.  He says his slurred speech started yesterday and also progressively worsened.  Due to these 2 symptoms he decided come to emergency room for further evaluation and treatment.  Patient also mentions some mild blurry vision the last 2 mornings which resolved on their own.  Patient denies any other positive review of systems.    Past Medical History:  Past Medical History:   Diagnosis Date    Abnormal screening CT of heart 2005    calcium score 6    Abnormal screening CT of heart 2010    calcium score of 18    Abnormal screening CT of heart 2015    calcium score 53    Adenomatous colon polyp 2014    Allergic rhinitis due to pollen     BPH (benign prostatic hyperplasia)     Cancer (HCC)     Neuroendocrine CA/Pancreatic    Cardiomyopathy (HCC)     2D Echo 23    COVID 2022    No hospitalization. Harsh coughing    Degeneration of cervical intervertebral disc     Diverticula of colon     Essential hypertension, benign     GOUT     High blood  pressure     High cholesterol     History of blood transfusion     No reaction    Hypertrophic cardiomyopathy (HCC)     Personal history of antineoplastic chemotherapy 12/05/2023    Last treatment    Pure hypercholesterolemia     Tinnitus, bilateral     Type II or unspecified type diabetes mellitus without mention of complication, not stated as uncontrolled     Visual impairment     Glasses        Past Surgical History:   Past Surgical History:   Procedure Laterality Date    CHOLECYSTECTOMY  06/21/1988    COLONOSCOPY  04/21/2008    tiny polyps    COLONOSCOPY  08/27/2014    Procedure: COLONOSCOPY;  Surgeon: Liang Quevedo MD;  Location:  ENDOSCOPY    COLONOSCOPY N/A 09/20/2017    Procedure: COLONOSCOPY;  Surgeon: Liang Quevedo MD;  Location:  ENDOSCOPY    COLONOSCOPY      PERIPHERAL VASCULAR SCREENING HISTORICAL CONV Bilateral 05/22/2017    mild carotid narrowing, PAD screen    UPPER GI ENDOSCOPY,EXAM      VASCULAR LAB - [facility] Bilateral 11/01/2011    PAD screening normal       Social History:  reports that he quit smoking about 38 years ago. His smoking use included cigarettes. He has a 17 pack-year smoking history. He has never used smokeless tobacco. He reports that he does not drink alcohol and does not use drugs. No illegal drugs, , 2 children, not working, no cane or walker    Family History:   Family History   Problem Relation Age of Onset    Arthritis Father         TKA    Hypertension Father     Diabetes Father     Obesity Father     Cancer Mother 70        colon, ?uterine    Genito-Urinary Disorder Mother         hysterectomy    Substance Abuse Son     Asthma Son     High Cholesterol Son     Gastro-Intestinal Disorder Brother         colon polyps, GERD    Hypertension Brother     Neurological Disorder Daughter         MS    Diabetes Daughter     Hypertension Sister     Diabetes Sister     Lipids Sister    Mother passed  Father passed  2 brother living  1 of 2 sisters living     Allergies:    Allergies   Allergen Reactions    Amlodipine SWELLING     Ankle swelling on amlodipine.       Medications:    Current Facility-Administered Medications on File Prior to Encounter   Medication Dose Route Frequency Provider Last Rate Last Admin    [COMPLETED] potassium chloride (K-Dur) tab 40 mEq  40 mEq Oral Once Guille Davis MD   40 mEq at 24 0806    [COMPLETED] sodium chloride 0.9% infusion   Intravenous Once Shawna Ford DO 10 mL/hr at 24 0615 New Bag at 24 0615    [COMPLETED] sodium chloride 0.9% infusion   Intravenous Once Bacilio Ornelas MD 10 mL/hr at 24 1000 New Bag at 24 1000    [COMPLETED] sodium chloride 0.9 % IV bolus 500 mL  500 mL Intravenous Once Abhi Monaco MD   Stopped at 24 1430    [COMPLETED] pantoprazole (Protonix) 40 mg in sodium chloride 0.9% PF 10 mL IV push  40 mg Intravenous Once Abhi Monaco MD   40 mg at 24 1344    [] sodium chloride 0.9% infusion   Intravenous Continuous Abhi Monaco MD        [COMPLETED] sodium chloride 0.9% infusion   Intravenous Once Vianey Otero MD 10 mL/hr at 24 1106 New Bag at 24 1106    [COMPLETED] heparin (Porcine) 100 Units/mL lock flush 500 Units  5 mL Intravenous Once Aldo Padilla MD   500 Units at 24 1356     Current Outpatient Medications on File Prior to Encounter   Medication Sig Dispense Refill    pantoprazole 40 MG Oral Tab EC Take 1 tablet (40 mg total) by mouth 2 (two) times daily before meals. 60 tablet 0    sucralfate 1 g Oral Tab Take 1 tablet (1 g total) by mouth 3 (three) times daily before meals. 90 tablet 0    ondansetron 4 MG Oral Tablet Dispersible Take 1 tablet (4 mg total) by mouth every 8 (eight) hours as needed for Nausea.      rosuvastatin 10 MG Oral Tab Take 1 tablet (10 mg total) by mouth daily. (Patient taking differently: Take 1 tablet (10 mg total) by mouth nightly.) 90 tablet 3    allopurinol 300 MG Oral Tab Take 1 tablet (300 mg  total) by mouth daily. 90 tablet 1    metFORMIN HCl ER (GLUCOPHAGE XR) 500 MG Oral Tablet 24 Hr Take 1 tablet (500 mg total) by mouth daily with breakfast. (Patient taking differently: Take 1 tablet (500 mg total) by mouth every evening.) 90 tablet 1    indapamide 1.25 MG Oral Tab Take 1 tablet (1.25 mg total) by mouth every morning. 90 tablet 1    carvedilol 25 MG Oral Tab Take 1 tablet (25 mg total) by mouth 2 (two) times daily with meals. 180 tablet 1    Fenofibrate 54 MG Oral Tab Take 1 tablet (54 mg total) by mouth daily. with food (Patient taking differently: Take 1 tablet (54 mg total) by mouth at bedtime. with food) 90 tablet 1    pantoprazole 40 MG Oral Tab EC Take 1 tablet (40 mg total) by mouth 2 (two) times daily before meals. 60 tablet 0    Microlet Lancets Does not apply Misc Test daily 100 each 3    Glucose Blood (CONTOUR NEXT TEST) In Vitro Strip Test blood sugar daily 100 each 1    Glucose Blood In Vitro Strip ONE EVERY  strip 3    Fexofenadine HCl (ALLEGRA OR) Take 1 tablet by mouth daily.         Review of Systems:   A comprehensive 14 point review of systems was completed.    Pertinent positives and negatives noted in the HPI.    Physical Exam:    /69   Pulse 60   Temp 98 °F (36.7 °C)   Resp 12   Ht 5' 7\" (1.702 m)   Wt 195 lb (88.5 kg)   SpO2 98%   BMI 30.54 kg/m²   General: No acute distress. Alert and oriented x 3.  HEENT: Normocephalic atraumatic. Moist mucous membranes. EOM-I.   Neck: No JVD. No carotid bruits.  Respiratory: Clear to auscultation bilaterally. No wheezes. No crackles  Cardiovascular: S1, S2. Regular rate and rhythm. No murmurs  Chest and Back: No tenderness or deformity.  Abdomen: Soft, nontender, nondistended.  Positive bowel sounds. No rebound, guarding  Neurologic: No focal neurological deficits. CNII-XII grossly intact. Sensation and strength intact  Musculoskeletal: Moves all extremities.  Extremities: No significant pitting edema or tenderness of the  LE  Integument: No new rashes or lesions.   Psychiatric: Appropriate mood and affect.      Diagnostic Data:      Labs:  Recent Labs   Lab 04/05/24  0913 04/05/24  1016   WBC 6.6  --    HGB 9.9*  --    MCV 92.8  --    .0  --    INR 1.00 1.11       Recent Labs   Lab 04/05/24  0913   *   BUN 9   CREATSERUM 1.20   CA 9.7   ALB 3.1*   *   K 4.3      CO2 27.0   ALKPHO 57   AST 45*   ALT 16   BILT 0.5   TP 6.7       Estimated Creatinine Clearance: 51.3 mL/min (based on SCr of 1.2 mg/dL).    Recent Labs   Lab 04/05/24  0913 04/05/24  1016   PTP 13.2 14.3   INR 1.00 1.11       No results for input(s): \"TROP\", \"CK\" in the last 168 hours.    Imaging: Imaging data reviewed in Epic.      ASSESSMENT / PLAN:   73 year old male with history of BPH, neuroendocrine cancer, cardiomyopathy, hypertension, gout, hyperlipidemia, type 2 diabetes presenting with slurred speech and difficulty walking.    Slurred Speech  Ataxia  -etiology uncertain  -ct brain neg for acute pathology  -MRI brain pending  -neurology consulted  -PT/OT/ST  -tele    HTN  -sbp stable  -Carvedilol    HLD  -fenofibrate  -rosuvastatin     GI bleed hx  -continue pantoprazole  -continue sucralfate     Quality:  DVT Prophylaxis: scd  CODE status:   Mota: none    Plan of care discussed with patient and staff    Dispo: no discharge    MD Jack Altman Hospitalist  [phone]    Addendum: pt with mets on mRI. Oncology consulted, heparin subcutaneous stopped until ok with oncology, follow neuro and onc recs.

## 2024-04-05 NOTE — ED PROVIDER NOTES
Patient Seen in: [facility] Emergency Department      History     Chief Complaint   Patient presents with    Stroke     Balance issues began Friday, Slurred speak Monday. Blurred vision this AM.   Chemo patient     Stated Complaint: Stroke    Subjective:   HPI    Patient presents with stroke symptoms.  The patient states that he started to have balance issues and lightheadedness last Friday.  His wife states that she noticed slurred speech on Tuesday.  They followed up with her doctor after his recent hospitalization on Wednesday who thought he was probably dehydrated.  He was seen for a GI bleed and was transfused 4 units of PRBCs, I reviewed the records.  He has tried to push fluids but it does not seem to be improving the symptoms.  He feels like he has to hold onto something to walk.  He denies any difficulty swallowing.  He denies any focal weakness or numbness.  He has no headache.  He states he has had a couple episodes of blurry vision that have resolved as well as nausea that has resolved.  He has not had any recurrent black or bloody stools.  He has actually been constipated this week.  He had a small bowel movement last night but actually got a little diaphoretic with straining.  He denies any chest pain.  He has not had a fever.    Objective:   Past Medical History:   Diagnosis Date    Abnormal screening CT of heart 03/31/2005    calcium score 6    Abnormal screening CT of heart 02/09/2010    calcium score of 18    Abnormal screening CT of heart 04/21/2015    calcium score 53    Adenomatous colon polyp 08/27/2014    Allergic rhinitis due to pollen     BPH (benign prostatic hyperplasia)     Cancer (HCC)     Neuroendocrine CA/Pancreatic    Cardiomyopathy (HCC)     2D Echo 9/22/23    COVID 08/2022    No hospitalization. Harsh coughing    Degeneration of cervical intervertebral disc     Diverticula of colon     Essential hypertension, benign     GOUT     High blood pressure     High cholesterol      History of blood transfusion     No reaction    Hypertrophic cardiomyopathy (HCC)     Personal history of antineoplastic chemotherapy 2023    Last treatment    Pure hypercholesterolemia     Tinnitus, bilateral     Type II or unspecified type diabetes mellitus without mention of complication, not stated as uncontrolled     Visual impairment     Glasses              Past Surgical History:   Procedure Laterality Date    CHOLECYSTECTOMY  1988    COLONOSCOPY  2008    tiny polyps    COLONOSCOPY  2014    Procedure: COLONOSCOPY;  Surgeon: Liang Quevedo MD;  Location:  ENDOSCOPY    COLONOSCOPY N/A 2017    Procedure: COLONOSCOPY;  Surgeon: Liang Quevedo MD;  Location:  ENDOSCOPY    COLONOSCOPY      PERIPHERAL VASCULAR SCREENING HISTORICAL CONV Bilateral 2017    mild carotid narrowing, PAD screen    UPPER GI ENDOSCOPY,EXAM      VASCULAR LAB - [facility] Bilateral 2011    PAD screening normal                Social History     Socioeconomic History    Marital status:      Spouse name: Dayan    Number of children: 2    Years of education: 12   Occupational History    Occupation:      Comment: self employed   Tobacco Use    Smoking status: Former     Packs/day: 1.00     Years: 17.00     Additional pack years: 0.00     Total pack years: 17.00     Types: Cigarettes     Quit date: 1986     Years since quittin.2    Smokeless tobacco: Never   Vaping Use    Vaping Use: Never used   Substance and Sexual Activity    Alcohol use: No     Alcohol/week: 0.0 standard drinks of alcohol    Drug use: No    Sexual activity: Yes     Partners: Female   Other Topics Concern     Service Yes     Comment: [employer]     Blood Transfusions No    Caffeine Concern Yes     Comment: coffee 1 cup a day    Occupational Exposure Yes     Comment: asbestos    Hobby Hazards No    Sleep Concern No    Stress Concern No    Weight Concern Yes    Special Diet No    Back Care No    Exercise  Yes     Comment: walk    Bike Helmet No    Seat Belt Yes    Self-Exams No   Social History Narrative    Lives with wife, and son    Enjoys [place]     Social Determinants of Health     Food Insecurity: No Food Insecurity (4/5/2024)    Food Insecurity     Food Insecurity: Never true   Transportation Needs: No Transportation Needs (4/5/2024)    Transportation Needs     Lack of Transportation: No   Housing Stability: Low Risk  (4/5/2024)    Housing Stability     Housing Instability: No              Review of Systems    Positive for stated complaint: Stroke  Other systems are as noted in HPI.  Constitutional and vital signs reviewed.      All other systems reviewed and negative except as noted above.    Physical Exam     ED Triage Vitals   BP 04/05/24 0902 103/63   Pulse 04/05/24 0902 66   Resp 04/05/24 0902 19   Temp 04/05/24 0902 98 °F (36.7 °C)   Temp src 04/05/24 1551 Oral   SpO2 04/05/24 0902 100 %   O2 Device 04/05/24 0902 None (Room air)       Current:/67 (BP Location: Left arm)   Pulse 67   Temp 97.9 °F (36.6 °C) (Oral)   Resp 18   Ht 170.2 cm (5' 7\")   Wt 88.5 kg   SpO2 98%   BMI 30.54 kg/m²         Physical Exam    General: Alert and oriented x3, appears fatigued, speech slightly slurred.  HEENT: Normocephalic, atraumatic, pupils equal round and reactive to light, oropharynx clear.  Neck: Supple.  Cardiovascular: Regular rate and rhythm, + murmur.  Respiratory: Lungs clear to auscultation.  Abdomen: Soft, nontender, no rebound or guarding, normal active bowel sounds, no CVA tenderness.  Extremities: No CCE.  Skin: Warm and dry.  Neurologic: Cranial nerves intact.  Strength 5/5 in all extremities.  Sensory exam grossly intact.    ED Course     Labs Reviewed   COMP METABOLIC PANEL (14) - Abnormal; Notable for the following components:       Result Value    Glucose 222 (*)     Sodium 134 (*)     AST 45 (*)     Albumin 3.1 (*)     A/G Ratio 0.9 (*)     All other components within normal limits   POCT  GLUCOSE - Abnormal; Notable for the following components:    POC Glucose 155 (*)     All other components within normal limits   CBC W/ DIFFERENTIAL - Abnormal; Notable for the following components:    RBC 3.20 (*)     HGB 9.9 (*)     HCT 29.7 (*)     Lymphocyte Absolute 0.72 (*)     All other components within normal limits   PROTHROMBIN TIME (PT) - Normal   PROTHROMBIN TIME (PT) - Normal   PTT, ACTIVATED - Normal   CBC WITH DIFFERENTIAL WITH PLATELET    Narrative:     The following orders were created for panel order CBC With Differential With Platelet.  Procedure                               Abnormality         Status                     ---------                               -----------         ------                     CBC W/ DIFFERENTIAL[[id number]]          Abnormal            Final result                 Please view results for these tests on the individual orders.   SCAN SLIDE     EKG    Rate, intervals and axes as noted on EKG Report.  Rate: 63  Rhythm: Sinus Rhythm  Reading: Left axis deviation, nonspecific intraventricular block, minimal voltage criteria for LVH, may be normal variant         I personally reviewed the chest films and no consolidation or pulmonary edema noted.        XR CHEST AP PORTABLE  (CPT=71045)    Result Date: 4/5/2024  PROCEDURE:  XR CHEST AP PORTABLE  (CPT=71045)  TECHNIQUE:  AP chest radiograph was obtained.  COMPARISON:  [facility] , [facility]OSHIA, XR CHEST AP PORTABLE  (CPT=71045), 3/24/2024, 1:56 PM.  INDICATIONS:  Stroke  PATIENT STATED HISTORY: (As transcribed by Technologist)  Pt has slurred speech and weakness.               CONCLUSION:   Stable cardiac and mediastinal contours.  No pulmonary edema or focal airspace consolidation.  The pleural spaces are clear.  Right subclavian chest port terminates in the mid SVC.   LOCATION:  [facility]      Dictated by (CST): Sherrie Gentile MD on 4/05/2024 at 10:15 AM     Finalized by (CST): Sherrie Gentile MD on 4/05/2024 at 10:15 AM       CT BRAIN OR HEAD  (70056)    Result Date: 4/5/2024  PROCEDURE:  CT BRAIN OR HEAD (15753)  COMPARISON:  None.  INDICATIONS:  Stroke  TECHNIQUE:  Noncontrast CT scanning is performed through the brain. Dose reduction techniques were used. Dose information is transmitted to the ACR (American College of Radiology) NRDR (National Radiology Data Registry) which includes the Dose Index Registry.  PATIENT STATED HISTORY: (As transcribed by Technologist)  Dizziness, slurred speech    FINDINGS:  Ventricles and sulci are normal caliber.  No extra-axial fluid collection.  No mass effect or midline shift.  No acute intracranial hemorrhage.  There is white matter low attenuation consistent with mild chronic small vessel disease.  Mild mucoperiosteal changes are seen in the ethmoid and maxillary sinuses.  The remainder appear satisfactorily aerated as do the mastoid air cells, noting hypoplastic frontal sinuses.             CONCLUSION:  No acute intracranial abnormality.    LOCATION:  [facility]   Dictated by (CST): Alphonso Pantoja MD on 4/05/2024 at 9:47 AM     Finalized by (CST): Alphonso Pantoja MD on 4/05/2024 at 9:49 AM       XR CHEST AP PORTABLE  (CPT=71045)    Result Date: 3/24/2024  PROCEDURE:  XR CHEST AP PORTABLE  (CPT=71045)  TECHNIQUE:  AP chest radiograph was obtained.  COMPARISON:  None.  INDICATIONS:  cancer patient, black stool, weak  PATIENT STATED HISTORY: (As transcribed by Technologist)  Patient states his hemoglobin has been low values for a few days. He states this is a recurring issue. Patient is currently undergoing chemotherapy.             CONCLUSION:  Hyperinflation both lungs consistent with COPD.  Slight infiltrate versus atelectasis in the lung bases.  Port-A-Cath tip SVC.  No pneumothorax or pleural effusion.   LOCATION:  [facility]      Dictated by (CST): Rachael Mock MD on 3/24/2024 at 2:23 PM     Finalized by (CST): Rachael Mock MD on 3/24/2024 at 2:24 PM        Patient had an NIH stroke scale of 1 for his slurred speech.  He  was not a candidate for TNK given the timing of onset of symptoms and recent GI bleed.  He was not given any anticoagulation at this point for the above reason as well.       MDM      Patient presents with slurred speech and balance difficulties.  Differential diagnosis includes but is not limited to acute stroke, intracranial lesion, dehydration and anemia.  The patient has no acute finding on CT and has an MRI of his brain pending for further evaluation.  His hemoglobin appears to be improving and his chemistry panel shows hyperglycemia but is otherwise unremarkable.  I discussed his case with Dr. Ford from neurology who agreed with the MRI.  MRI is not yet available but the patient will be admitted in the meantime for further treatment pending the results.  Dr. Matt from the [facility] service has agreed to admit him and will follow-up on the results.  Patient and family were updated on the findings and plan of care.  Admission disposition: 4/5/2024 11:47 AM                                Medical Decision Making      Disposition and Plan     Clinical Impression:  1. Slurred speech    2. Gait instability         Disposition:  Admit  4/5/2024 11:47 am    Follow-up:  No follow-up provider specified.        Medications Prescribed:  Current Discharge Medication List                            Hospital Problems       Present on Admission  Date Reviewed: 3/21/2022            ICD-10-CM Noted POA    * (Principal) Slurred speech R47.81 4/5/2024 Unknown    Gait instability R26.81 4/5/2024 Unknown

## 2024-04-05 NOTE — ED QUICK NOTES
Rounding Completed    Plan of Care reviewed.   Pt returned from CT scan. Waiting for XR  Family member x1 at bedside.    Bed is locked and in lowest position. Call light within reach.

## 2024-04-06 LAB
ALBUMIN SERPL-MCNC: 2.7 G/DL (ref 3.4–5)
ALBUMIN/GLOB SERPL: 0.9 {RATIO} (ref 1–2)
ALP LIVER SERPL-CCNC: 53 U/L
ALT SERPL-CCNC: 12 U/L
ANION GAP SERPL CALC-SCNC: 3 MMOL/L (ref 0–18)
AST SERPL-CCNC: 18 U/L (ref 15–37)
BASOPHILS # BLD AUTO: 0.02 X10(3) UL (ref 0–0.2)
BASOPHILS NFR BLD AUTO: 0.4 %
BILIRUB SERPL-MCNC: 0.4 MG/DL (ref 0.1–2)
BUN BLD-MCNC: 9 MG/DL (ref 9–23)
CALCIUM BLD-MCNC: 8.6 MG/DL (ref 8.5–10.1)
CHLORIDE SERPL-SCNC: 102 MMOL/L (ref 98–112)
CO2 SERPL-SCNC: 32 MMOL/L (ref 21–32)
CREAT BLD-MCNC: 1.01 MG/DL
EGFRCR SERPLBLD CKD-EPI 2021: 79 ML/MIN/1.73M2 (ref 60–?)
EOSINOPHIL # BLD AUTO: 0 X10(3) UL (ref 0–0.7)
EOSINOPHIL NFR BLD AUTO: 0 %
ERYTHROCYTE [DISTWIDTH] IN BLOOD BY AUTOMATED COUNT: 17.1 %
GLOBULIN PLAS-MCNC: 2.9 G/DL (ref 2.8–4.4)
GLUCOSE BLD-MCNC: 156 MG/DL (ref 70–99)
GLUCOSE BLD-MCNC: 157 MG/DL (ref 70–99)
GLUCOSE BLD-MCNC: 187 MG/DL (ref 70–99)
GLUCOSE BLD-MCNC: 197 MG/DL (ref 70–99)
GLUCOSE BLD-MCNC: 237 MG/DL (ref 70–99)
GLUCOSE BLD-MCNC: 87 MG/DL (ref 70–99)
HCT VFR BLD AUTO: 25.3 %
HGB BLD-MCNC: 8.4 G/DL
IMM GRANULOCYTES # BLD AUTO: 0.01 X10(3) UL (ref 0–1)
IMM GRANULOCYTES NFR BLD: 0.2 %
LYMPHOCYTES # BLD AUTO: 0.9 X10(3) UL (ref 1–4)
LYMPHOCYTES NFR BLD AUTO: 17.2 %
MAGNESIUM SERPL-MCNC: 1.3 MG/DL (ref 1.6–2.6)
MCH RBC QN AUTO: 30.5 PG (ref 26–34)
MCHC RBC AUTO-ENTMCNC: 33.2 G/DL (ref 31–37)
MCV RBC AUTO: 92 FL
MONOCYTES # BLD AUTO: 0.79 X10(3) UL (ref 0.1–1)
MONOCYTES NFR BLD AUTO: 15.1 %
NEUTROPHILS # BLD AUTO: 3.52 X10 (3) UL (ref 1.5–7.7)
NEUTROPHILS # BLD AUTO: 3.52 X10(3) UL (ref 1.5–7.7)
NEUTROPHILS NFR BLD AUTO: 67.1 %
OSMOLALITY SERPL CALC.SUM OF ELEC: 286 MOSM/KG (ref 275–295)
PLATELET # BLD AUTO: 169 10(3)UL (ref 150–450)
POTASSIUM SERPL-SCNC: 3.5 MMOL/L (ref 3.5–5.1)
POTASSIUM SERPL-SCNC: 4.7 MMOL/L (ref 3.5–5.1)
PROT SERPL-MCNC: 5.6 G/DL (ref 6.4–8.2)
RBC # BLD AUTO: 2.75 X10(6)UL
SODIUM SERPL-SCNC: 137 MMOL/L (ref 136–145)
WBC # BLD AUTO: 5.2 X10(3) UL (ref 4–11)

## 2024-04-06 RX ORDER — POTASSIUM CHLORIDE 20 MEQ/1
40 TABLET, EXTENDED RELEASE ORAL EVERY 4 HOURS
Status: COMPLETED | OUTPATIENT
Start: 2024-04-06 | End: 2024-04-06

## 2024-04-06 RX ORDER — DEXAMETHASONE 4 MG/1
4 TABLET ORAL EVERY 12 HOURS SCHEDULED
Status: COMPLETED | OUTPATIENT
Start: 2024-04-06 | End: 2024-04-06

## 2024-04-06 RX ORDER — MAGNESIUM OXIDE 400 MG/1
800 TABLET ORAL ONCE
Status: COMPLETED | OUTPATIENT
Start: 2024-04-06 | End: 2024-04-06

## 2024-04-06 NOTE — PLAN OF CARE
Assumed care at 1930.   A&Ox4, ra, dawns.   Sr on tele.   Scheduled medications given.   Insulin given per sliding scale.   Assisted to bathroom, with one assist.   Fall precaution maintained for patient's needs.   Updated on plan of care.       Problem: Patient/Family Goals  Goal: Patient/Family Long Term Goal  Description: Patient's Long Term Goal: Discharge home     Interventions:  - Follow up with discharge instructions  - See additional Care Plan goals for specific interventions  Outcome: Progressing  Goal: Patient/Family Short Term Goal  Description: Patient's Short Term Goal: Safety    Interventions:   - Free from falls.   - See additional Care Plan goals for specific interventions  Outcome: Progressing

## 2024-04-06 NOTE — PHYSICAL THERAPY NOTE
PHYSICAL THERAPY EVALUATION - INPATIENT     Room Number: 3620/3620-A  Evaluation Date: 4/6/2024  Type of Evaluation: Initial  Physician Order: PT Eval and Treat    Presenting Problem: slurred speech  Co-Morbidities : neuroendocrine CA, cardiomyopathy, HTN, gout, DM2, HL, BPH  Reason for Therapy: Mobility Dysfunction and Discharge Planning    PHYSICAL THERAPY ASSESSMENT   Patient is currently functioning below baseline with bed mobility, transfers, gait, and stair negotiation.  Prior to admission, patient's baseline is modified independent with amb no assistive device.  Patient is requiring contact guard assist with RW as a result of the following impairments: impaired standing balance and medical status.  Physical Therapy will continue to follow for duration of hospitalization.    Patient will benefit from continued skilled PT Services at discharge to promote functional independence and safety with additional support and return home with home health PT.    PLAN  PT Treatment Plan: Energy conservation;Patient education;Family education;Gait training;Stair training;Transfer training;Balance training  Rehab Potential : Good  Frequency (Obs): 3-5x/week  Number of Visits to Meet Established Goals: 3      CURRENT GOALS    Goal #1 Patient is able to demonstrate supine - sit EOB @ level: supervision     Goal #2 Patient is able to demonstrate transfers Sit to/from Stand at assistance level: supervision     Goal #3 Patient is able to ambulate 100 feet with assist device:  least resistive assistive device  at assistance level: supervision     Goal #4 Pt able to ascend/descend 5 steps with handrail with CGA.   Goal #5    Goal #6    Goal Comments: Goals established on 4/6/2024      PHYSICAL THERAPY MEDICAL/SOCIAL HISTORY  History related to current admission: Patient is a 73 year old male admitted on 4/5/2024 from home for weakness, blurry vision, slurred speech.  Pt diagnosed with slurred speech.  MRI brain 4/5/24  CONCLUSION:       1. No acute intracranial abnormality identified.      2. 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  The largest focal metastatic lesion is in the high left frontal lobe measuring up to 12 x 9 mm on image 163 series 11 with mild associated edema. A cortically based metastatic   lesion in the right parietal lobe on image 172 series 11 measures 5 x 4 mm which also demonstrates gradient susceptibility which may be due to underlying calcification and/or blood products.  A nodular focus of metastatic disease along the right   occipital lobe is noted on image 117 series 11 measuring up to 4 x 3 mm.  A nodular focus of leptomeningeal metastatic disease along the right occipital lobe on image 103 series 11 measures up to 5 x 3 mm.  Metastatic disease along the cortex of the   posterior left temporal lobe is best seen on image 80 series 11 measuring up to 7 x 5 mm. 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       3. Minimal chronic microvascular ischemic changes in the cerebral white matter.    HOME SITUATION  Type of Home: House   Home Layout: Multi-level  Stairs to Enter : 4  Railing: Yes  Stairs to Bedroom: 5  Railing: Yes    Lives With: Family (wife and son)  Drives: Yes  Patient Owned Equipment: Rolling walker  Patient Regularly Uses: Glasses    Prior Level of Emerson: per pt reports, pt was amb without assistive device, without assist. Pt was driving prior to admit. Pt lives with wife and son.  Pt reports wife and son can assist with needs upon discharge. Pt denies recent fall hx.     SUBJECTIVE  \"I feel better.\"      OBJECTIVE  Precautions: Bed/chair alarm  Fall Risk: High fall  risk    WEIGHT BEARING RESTRICTION  Weight Bearing Restriction: None                PAIN ASSESSMENT  Ratin  Location: denies pain at this time       COGNITION  Overall Cognitive Status:  WFL - within functional limits    RANGE OF MOTION AND STRENGTH ASSESSMENT  Upper extremity ROM and strength are within functional limits    Lower extremity ROM is within functional limits     Lower extremity strength is within functional limits       BALANCE  Static Sitting: Good  Dynamic Sitting: Fair +  Static Standing: Fair - (with RW)  Dynamic Standing: Fair - (with RW)    ADDITIONAL TESTS                                    ACTIVITY TOLERANCE  Pulse: 71  Heart Rate Source: Monitor     BP: 130/72  BP Location: Left arm  BP Method: Automatic  Patient Position: Sitting    O2 WALK  Oxygen Therapy  SPO2% on Room Air at Rest: 100    NEUROLOGICAL FINDINGS  Neurological Findings: Coordination - Heel to Shin;Coordination - Rapid Alternating Movement;Sensation     Coordination - Heel to Shin: Symmetrical  Coordination - Rapid Alternating Movement: Symmetrical  Sensation: B LE's intact to light touch grossly         AM-PAC '6-Clicks' INPATIENT SHORT FORM - BASIC MOBILITY  How much difficulty does the patient currently have...  Patient Difficulty: Turning over in bed (including adjusting bedclothes, sheets and blankets)?: None   Patient Difficulty: Sitting down on and standing up from a chair with arms (e.g., wheelchair, bedside commode, etc.): A Little   Patient Difficulty: Moving from lying on back to sitting on the side of the bed?: None   How much help from another person does the patient currently need...   Help from Another: Moving to and from a bed to a chair (including a wheelchair)?: A Little   Help from Another: Need to walk in hospital room?: A Little   Help from Another: Climbing 3-5 steps with a railing?: A Little       AM-PAC Score:  Raw Score: 20   Approx Degree of Impairment: 35.83%   Standardized Score (AM-PAC Scale):  47.67   CMS Modifier (G-Code): CJ    FUNCTIONAL ABILITY STATUS  Gait Assessment   Functional Mobility/Gait Assessment  Gait Assistance: Contact guard assist  Distance (ft): 150  Assistive Device: Rolling walker  Pattern: Comment (decrease heel strike R vs L, unsteady gait, wide CECE)  Stairs: Stairs  How Many Stairs: 2  Device: 1 Rail  Assist: Minimal assist  Pattern: Ascend and Descend  Ascend and Descend : Step to    Skilled Therapy Provided     Bed Mobility:  Rolling: not tested  Supine to sit: not tested   Sit to supine: not tested     Transfer Mobility:  Sit to stand: CGA from EOB to RW   Stand to sit: CGA  Gait = pt amb x 150 feet with RW and CGA for safety and balance.    Therapist's Comments: per RN pt ok to be seen. Pt sitting at EOB and agreeable to therapy. Pt denies pain, reports lightheadedness in sitting. No change in lightheadedness throughout session. Pt with minimally unsteady gait with RW requiring CGA and verbal cues for proximity to RW. Pt had difficulty ascending 2 steps with 1 rail with min assist.  Pt returns to room and is seated in bedside chair. Pt educated on activity recommendations, safety with amb with RW, to have family with him ascending/descending stairs, call don't fall, and questions answered. Pt left in chair with alarm set and RN updated.     Exercise/Education Provided:  Bed mobility  Functional activity tolerated  Gait training  Transfer training    Patient End of Session: Up in chair;Needs met;Call light within reach;RN aware of session/findings;All patient questions and concerns addressed;Alarm set;Family present;Discussed recommendations with /      Patient Evaluation Complexity Level:  History Moderate - 1 or 2 personal factors and/or co-morbidities   Examination of body systems Low - addressing 1-2 elements   Clinical Presentation Low - Stable   Clinical Decision Making Low - Stable       PT Session Time: 30 minutes  Gait Training: 10  minutes

## 2024-04-06 NOTE — CONSULTS
[facility]  Report of Consultation    Renato Hall Patient Status:  Inpatient    1950 MRN [id number]   Location [facility] 3NE-A Attending Jose Matt MD   Hosp Day # 1 PCP Deyanira Resendez MD     Reason for Consultation:  Known to us    History of Present Illness:  Renato Hall is a a(n) 73 year old male with history of NET that presented to [facility] with slurred speech and difficulty walking.  He was recently admitted for GI bleeding and needed PRBCs and had a stapling procedure for gastric bleeding.    He was doing well at home but then note difficulty ambulating and he noted progressive worsening of this.  Slurred speech started one day PTA.    He was evaluated in ED yesterday and found to have a normal CT scan, but MRI showed intraparenchymal and LM disease.    Of note he was set to see Dr. Duran from Rad [facility]c next week for large ulcerated gastric mass with invasion to pancreas per last admission.    Given continued oozing during last admission, radiation considered an option.     History:  Past Medical History:   Diagnosis Date    Abnormal screening CT of heart 2005    calcium score 6    Abnormal screening CT of heart 2010    calcium score of 18    Abnormal screening CT of heart 2015    calcium score 53    Adenomatous colon polyp 2014    Allergic rhinitis due to pollen     BPH (benign prostatic hyperplasia)     Cancer (HCC)     Neuroendocrine CA/Pancreatic    Cardiomyopathy (HCC)     2D Echo 23    COVID 2022    No hospitalization. Harsh coughing    Degeneration of cervical intervertebral disc     Diverticula of colon     Essential hypertension, benign     GOUT     High blood pressure     High cholesterol     History of blood transfusion     No reaction    Hypertrophic cardiomyopathy (HCC)     Personal history of antineoplastic chemotherapy 2023    Last treatment    Pure hypercholesterolemia     Tinnitus, bilateral     Type II or unspecified type diabetes  mellitus without mention of complication, not stated as uncontrolled     Visual impairment     Glasses     Past Surgical History:   Procedure Laterality Date    CHOLECYSTECTOMY  06/21/1988    COLONOSCOPY  04/21/2008    tiny polyps    COLONOSCOPY  08/27/2014    Procedure: COLONOSCOPY;  Surgeon: Liang Quevedo MD;  Location:  ENDOSCOPY    COLONOSCOPY N/A 09/20/2017    Procedure: COLONOSCOPY;  Surgeon: Liang Quevedo MD;  Location:  ENDOSCOPY    COLONOSCOPY      PERIPHERAL VASCULAR SCREENING HISTORICAL CONV Bilateral 05/22/2017    mild carotid narrowing, PAD screen    UPPER GI ENDOSCOPY,EXAM      VASCULAR LAB - [facility] Bilateral 11/01/2011    PAD screening normal     Family History   Problem Relation Age of Onset    Arthritis Father         TKA    Hypertension Father     Diabetes Father     Obesity Father     Cancer Mother 70        colon, ?uterine    Genito-Urinary Disorder Mother         hysterectomy    Substance Abuse Son     Asthma Son     High Cholesterol Son     Gastro-Intestinal Disorder Brother         colon polyps, GERD    Hypertension Brother     Neurological Disorder Daughter         MS    Diabetes Daughter     Hypertension Sister     Diabetes Sister     Lipids Sister       reports that he quit smoking about 38 years ago. His smoking use included cigarettes. He has a 17 pack-year smoking history. He has never used smokeless tobacco. He reports that he does not drink alcohol and does not use drugs.    Allergies:  Allergies   Allergen Reactions    Amlodipine SWELLING     Ankle swelling on amlodipine.       Medications:    Current Facility-Administered Medications:     potassium chloride (K-Dur) tab 40 mEq, 40 mEq, Oral, Q4H    heparin (Porcine) 100 Units/mL lock flush 500 Units, 5 mL, Intravenous, PRN    allopurinol (Zyloprim) tab 300 mg, 300 mg, Oral, Daily    carvedilol (Coreg) tab 25 mg, 25 mg, Oral, BID with meals    fenofibrate micronized (Lofibra) cap 67 mg, 67 mg, Oral, Nightly    ondansetron  (Zofran-ODT) disintegrating tab 4 mg, 4 mg, Oral, Q8H PRN    pantoprazole (Protonix) DR tab 40 mg, 40 mg, Oral, BID AC    rosuvastatin (Crestor) tab 10 mg, 10 mg, Oral, Nightly    sucralfate (Carafate) tab 1 g, 1 g, Oral, TID AC    glucose (Dex4) 15 GM/59ML oral liquid 15 g, 15 g, Oral, Q15 Min PRN **OR** glucose (Glutose) 40% oral gel 15 g, 15 g, Oral, Q15 Min PRN **OR** glucose-vitamin C (Dex-4) chewable tab 4 tablet, 4 tablet, Oral, Q15 Min PRN **OR** dextrose 50% injection 50 mL, 50 mL, Intravenous, Q15 Min PRN **OR** glucose (Dex4) 15 GM/59ML oral liquid 30 g, 30 g, Oral, Q15 Min PRN **OR** glucose (Glutose) 40% oral gel 30 g, 30 g, Oral, Q15 Min PRN **OR** glucose-vitamin C (Dex-4) chewable tab 8 tablet, 8 tablet, Oral, Q15 Min PRN    acetaminophen (Tylenol Extra Strength) tab 500 mg, 500 mg, Oral, Q4H PRN    ondansetron (Zofran) 4 MG/2ML injection 4 mg, 4 mg, Intravenous, Q6H PRN    metoclopramide (Reglan) 5 mg/mL injection 10 mg, 10 mg, Intravenous, Q8H PRN    insulin aspart (NovoLOG) 100 Units/mL FlexPen 1-5 Units, 1-5 Units, Subcutaneous, TID AC and HS    Review of Systems:  A 10-point review of systems was done with pertinent positives and negatives per the HPI.    Physical Exam:   Blood pressure 119/66, pulse 75, temperature 98 °F (36.7 °C), temperature source Oral, resp. rate 18, height 5' 7\" (1.702 m), weight 195 lb (88.5 kg), SpO2 97%.   General: Patient is alert and oriented x 3, not in acute distress.   HEENT: EOMs intact. PERRL. Oropharynx is clear.    Neck: No JVD. No palpable lymphadenopathy. Neck is supple.   Chest: Clear to auscultation.   Heart: Regular rate and rhythm.    Abdomen: Soft, non tender with good bowel sounds.   Extremities: Pedal pulses are present. No edema.   Neurological: Grossly intact.    Lymphatics: There is no palpable lymphadenopathy throughout in the cervical,            supraclavicular, axillary, or inguinal regions.     Laboratory Data:    Lab Results   Component Value  Date    WBC 5.2 04/06/2024    HGB 8.4 04/06/2024    HCT 25.3 04/06/2024    .0 04/06/2024    CREATSERUM 1.01 04/06/2024    BUN 9 04/06/2024     04/06/2024    K 3.5 04/06/2024     04/06/2024    CO2 32.0 04/06/2024     04/06/2024    CA 8.6 04/06/2024    ALB 2.7 04/06/2024    ALKPHO 53 04/06/2024    BILT 0.4 04/06/2024    TP 5.6 04/06/2024    AST 18 04/06/2024    ALT 12 04/06/2024    PTT 25.9 04/05/2024    INR 1.11 04/05/2024    MG 1.3 04/06/2024       Imaging:  MRI BRAIN (W+WO) (CPT=70553)    Result Date: 4/5/2024  CONCLUSION:   1. No acute intracranial abnormality identified.  2. 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  The largest focal metastatic lesion is in the high left frontal lobe measuring up to 12 x 9 mm on image 163 series 11 with mild associated edema. A cortically based metastatic lesion in the right parietal lobe on image 172 series 11 measures 5 x 4 mm which also demonstrates gradient susceptibility which may be due to underlying calcification and/or blood products.  A nodular focus of metastatic disease along the right occipital lobe is noted on image 117 series 11 measuring up to 4 x 3 mm.  A nodular focus of leptomeningeal metastatic disease along the right occipital lobe on image 103 series 11 measures up to 5 x 3 mm.  Metastatic disease along the cortex of the posterior left temporal lobe is best seen on image 80 series 11 measuring up to 7 x 5 mm. There is moderate to severe leptomeningeal metastatic disease along the cerebellar folia as well as along the surface of the brainstem, within the internal auditory  canals along the 7/8 nerve complexes, along the cisternal segments of the trigeminal nerves, and along the optic chiasm as well as the pre chiasmatic optic nerves.   3. Minimal chronic microvascular ischemic changes in the cerebral white matter.  Please see above for further details.  LOCATION:  [id number]    Dictated by (CST): Ej  MD Floyd on 4/05/2024 at 2:12 PM     Finalized by (CST): Floyd Pagan MD on 4/05/2024 at 2:23 PM       XR CHEST AP PORTABLE  (CPT=71045)    Result Date: 4/5/2024  CONCLUSION:   Stable cardiac and mediastinal contours.  No pulmonary edema or focal airspace consolidation.  The pleural spaces are clear.  Right subclavian chest port terminates in the mid SVC.   LOCATION:  [facility]      Dictated by (CST): Sherrie Gentile MD on 4/05/2024 at 10:15 AM     Finalized by (CST): Sherrie Gentile MD on 4/05/2024 at 10:15 AM       CT BRAIN OR HEAD (93504)    Result Date: 4/5/2024  CONCLUSION:  No acute intracranial abnormality.    LOCATION:  [facility]   Dictated by (CST): Alphonso Pantoja MD on 4/05/2024 at 9:47 AM     Finalized by (CST): Alphonso Pantoja MD on 4/05/2024 at 9:49 AM       XR CHEST AP PORTABLE  (CPT=71045)    Result Date: 3/24/2024  CONCLUSION:  Hyperinflation both lungs consistent with COPD.  Slight infiltrate versus atelectasis in the lung bases.  Port-A-Cath tip SVC.  No pneumothorax or pleural effusion.   LOCATION:  [facility]      Dictated by (CST): Rachael Mock MD on 3/24/2024 at 2:23 PM     Finalized by (CST): Rachael Mock MD on 3/24/2024 at 2:24 PM         Impression and Plan:    Patient Active Problem List   Diagnosis    Essential hypertension, benign    Pure hypercholesterolemia    Hyperuricemia    Allergic rhinitis due to pollen    Sensorineural hearing loss, bilateral    Diabetes mellitus type II, non insulin dependent (HCC)    Hypertrophic cardiomyopathy (HCC)    Nasopalatine cyst    Stage 3a chronic kidney disease (HCC)    Rotator cuff tendinitis, right    Hypercholesterolemia with hypertriglyceridemia    BMI 40.0-44.9, adult (HCC)    Gastric mass    Rectal bleeding    Leukocytosis, unspecified type    Anemia, unspecified type    Hemoglobin drop    MDS (myelodysplastic syndrome) (HCC)    Hyponatremia    Anemia    Azotemia    Hyperglycemia    Gastrointestinal hemorrhage, unspecified gastrointestinal hemorrhage type     Thrombocytopenia (HCC)    Slurred speech    Gait instability     Hematologic/Oncologic History:  Digestive issues led to EGD and colonoscopy 2/13/2023  Finding of a large gastric mass  Biopsy proven poorly differentiated neuroendocrine carcinoma  Staging scans show lymphadenopathy, both local and distant, and invasion of tumor into pancreas  Chemotherapy with carboplatin and etoposide 3/21/2023-7/6/2023  Carboplatin reduced during cycles 4-6 due to shortage  Final cycle 8/22/2023  Symptomatic progression in November 2023  Topotecan 11/29/2023-  Recently admitted at [facility] with GI bleeding needing PRBCs and thought to be due to gastric tumor invading to pancreas.   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   Recent admission about 10 days ago with GI bleeding, needing PRBCs  Now presenting with neurologic symptoms and found to have LM and intraparenchymal disease on MRI  Had been previously set up to see [facility] to consider gastric radiation for bleeding issues    Plan  Dex 4 q 12 hours.  Monitor hemoglobin very closely in light of recent events.   He is on PPI  No role for inpatient chemotherapy  He will need palliative RT, likely WBRT given MRI results.    Thank you for allowing me to participate in the care of your patient.    Andrew Rivers MD  4/6/2024  7:29 AM

## 2024-04-06 NOTE — PROGRESS NOTES
[facility] Hospitalist Progress Note                                                                     [facility]   part of [facility]      Renato Hall  5/30/1950    SUBJECTIVE: No chest pain, palpitations, shortness of breath, cough, nausea, vomiting, abdominal pain. Patient with no bleeding. Patient with no improvement or worsening of neuro status.     OBJECTIVE:  Temp:  [97.8 °F (36.6 °C)-98.5 °F (36.9 °C)] 98 °F (36.7 °C)  Pulse:  [58-75] 75  Resp:  [11-19] 18  BP: (103-142)/(63-69) 119/66  SpO2:  [94 %-100 %] 97 %  Exam  Gen: No acute distress, alert and oriented x3, no new  focal neurologic deficits, still with slurred speech   Pulm: Lungs clear bilaterally, normal respiratory effort, no crackles, no wheezing  CV: Heart with regular rate and rhythm, no murmur.   Abd: Abdomen soft, nontender, nondistended, no organomegaly, bowel sounds present  MSK: No significant pitting edema or tenderness of the LE  Skin: no new rashes or lesions    Labs:   Recent Labs   Lab 04/05/24  0913 04/05/24  1016 04/06/24  0500   WBC 6.6  --  5.2   HGB 9.9*  --  8.4*   MCV 92.8  --  92.0   .0  --  169.0   INR 1.00 1.11  --        Recent Labs   Lab 04/05/24  0913 04/06/24  0500   * 137   K 4.3 3.5    102   CO2 27.0 32.0   BUN 9 9   CREATSERUM 1.20 1.01   CA 9.7 8.6   MG  --  1.3*   * 156*       Recent Labs   Lab 04/05/24  0913 04/06/24  0500   ALT 16 12*   AST 45* 18   ALB 3.1* 2.7*       Recent Labs   Lab 04/05/24  1417 04/05/24 2057 04/06/24  0540 04/06/24  0545   PGLU 155* 188* 87 157*       Meds:   Scheduled:    potassium chloride  40 mEq Oral Q4H    allopurinol  300 mg Oral Daily    carvedilol  25 mg Oral BID with meals    fenofibrate micronized  67 mg Oral Nightly    pantoprazole  40 mg Oral BID AC    rosuvastatin  10 mg Oral Nightly    sucralfate  1 g Oral TID AC    insulin aspart  1-5 Units Subcutaneous TID AC and HS     Continuous Infusions:    PRN: heparin, ondansetron, glucose **OR** glucose **OR** glucose-vitamin C **OR** dextrose **OR** glucose **OR** glucose **OR** glucose-vitamin C, acetaminophen, ondansetron, metoclopramide    ASSESSMENT / PLAN:   73 year old male with history of BPH, neuroendocrine cancer, cardiomyopathy, hypertension, gout, hyperlipidemia, type 2 diabetes presenting with slurred speech and difficulty walking.     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      Quality:  DVT Prophylaxis: scd  CODE status:   Mota: none     Plan of care discussed with patient and staff     Dispo: no discharge     Jose Matt MD  [facility]  [phone]

## 2024-04-06 NOTE — PLAN OF CARE
Assumed care at 0730  A/O x 4. No slurred speech observed.  RA  NSR on tele  VSS  Denies pain  Up with walker and contact guard assistance. Continent, voids in bathroom.  Cardiac diet with carb control. QID accucheck.  Cardiac EP - K replaced today. Redraw 4.7.  Port to R chest with good blood return. Unit draw.  Po steroids initiated today.  All needs met. Pt and pts wife updated. Will continue with plan of care.    Problem: RISK FOR INFECTION - ADULT  Goal: Absence of fever/infection during anticipated neutropenic period  Description: INTERVENTIONS  - Monitor WBC  - Administer growth factors as ordered  - Implement neutropenic guidelines  Outcome: Progressing     Problem: SAFETY ADULT - FALL  Goal: Free from fall injury  Description: INTERVENTIONS:  - Assess pt frequently for physical needs  - Identify cognitive and physical deficits and behaviors that affect risk of falls.  - [place] fall precautions as indicated by assessment.  - Educate pt/family on patient safety including physical limitations  - Instruct pt to call for assistance with activity based on assessment  - Modify environment to reduce risk of injury  - Provide assistive devices as appropriate  - Consider OT/PT consult to assist with strengthening/mobility  - Encourage toileting schedule  Outcome: Progressing     Problem: DISCHARGE PLANNING  Goal: Discharge to home or other facility with appropriate resources  Description: INTERVENTIONS:  - Identify barriers to discharge w/pt and caregiver  - Include patient/family/discharge partner in discharge planning  - Arrange for needed discharge resources and transportation as appropriate  - Identify discharge learning needs (meds, wound care, etc)  - Arrange for interpreters to assist at discharge as needed  - Consider post-discharge preferences of patient/family/discharge partner  - Complete POLST form as appropriate  - Assess patient's ability to be responsible for managing their own health  - Refer to  Case Management Department for coordinating discharge planning if the patient needs post-hospital services based on physician/LIP order or complex needs related to functional status, cognitive ability or social support system  Outcome: Progressing

## 2024-04-07 VITALS
WEIGHT: 195 LBS | SYSTOLIC BLOOD PRESSURE: 148 MMHG | HEIGHT: 67 IN | BODY MASS INDEX: 30.61 KG/M2 | HEART RATE: 68 BPM | OXYGEN SATURATION: 100 % | TEMPERATURE: 98 F | RESPIRATION RATE: 17 BRPM | DIASTOLIC BLOOD PRESSURE: 80 MMHG

## 2024-04-07 LAB
ANION GAP SERPL CALC-SCNC: 6 MMOL/L (ref 0–18)
BASOPHILS # BLD AUTO: 0 X10(3) UL (ref 0–0.2)
BASOPHILS NFR BLD AUTO: 0 %
BUN BLD-MCNC: 12 MG/DL (ref 9–23)
CALCIUM BLD-MCNC: 9.1 MG/DL (ref 8.5–10.1)
CHLORIDE SERPL-SCNC: 103 MMOL/L (ref 98–112)
CO2 SERPL-SCNC: 28 MMOL/L (ref 21–32)
CREAT BLD-MCNC: 0.9 MG/DL
EGFRCR SERPLBLD CKD-EPI 2021: 90 ML/MIN/1.73M2 (ref 60–?)
EOSINOPHIL # BLD AUTO: 0 X10(3) UL (ref 0–0.7)
EOSINOPHIL NFR BLD AUTO: 0 %
ERYTHROCYTE [DISTWIDTH] IN BLOOD BY AUTOMATED COUNT: 16.8 %
GLUCOSE BLD-MCNC: 186 MG/DL (ref 70–99)
GLUCOSE BLD-MCNC: 197 MG/DL (ref 70–99)
GLUCOSE BLD-MCNC: 210 MG/DL (ref 70–99)
HCT VFR BLD AUTO: 26.7 %
HGB BLD-MCNC: 9.1 G/DL
IMM GRANULOCYTES # BLD AUTO: 0.02 X10(3) UL (ref 0–1)
IMM GRANULOCYTES NFR BLD: 0.4 %
LYMPHOCYTES # BLD AUTO: 0.55 X10(3) UL (ref 1–4)
LYMPHOCYTES NFR BLD AUTO: 10.7 %
MAGNESIUM SERPL-MCNC: 1.6 MG/DL (ref 1.6–2.6)
MCH RBC QN AUTO: 31 PG (ref 26–34)
MCHC RBC AUTO-ENTMCNC: 34.1 G/DL (ref 31–37)
MCV RBC AUTO: 90.8 FL
MONOCYTES # BLD AUTO: 0.22 X10(3) UL (ref 0.1–1)
MONOCYTES NFR BLD AUTO: 4.3 %
NEUTROPHILS # BLD AUTO: 4.35 X10 (3) UL (ref 1.5–7.7)
NEUTROPHILS # BLD AUTO: 4.35 X10(3) UL (ref 1.5–7.7)
NEUTROPHILS NFR BLD AUTO: 84.6 %
OSMOLALITY SERPL CALC.SUM OF ELEC: 289 MOSM/KG (ref 275–295)
PLATELET # BLD AUTO: 196 10(3)UL (ref 150–450)
POTASSIUM SERPL-SCNC: 4.2 MMOL/L (ref 3.5–5.1)
RBC # BLD AUTO: 2.94 X10(6)UL
SODIUM SERPL-SCNC: 137 MMOL/L (ref 136–145)
WBC # BLD AUTO: 5.1 X10(3) UL (ref 4–11)

## 2024-04-07 RX ORDER — DEXAMETHASONE 4 MG/1
4 TABLET ORAL EVERY 12 HOURS SCHEDULED
Status: DISCONTINUED | OUTPATIENT
Start: 2024-04-07 | End: 2024-04-07

## 2024-04-07 RX ORDER — DEXAMETHASONE 4 MG/1
4 TABLET ORAL EVERY 12 HOURS SCHEDULED
Qty: 6 TABLET | Refills: 0 | Status: SHIPPED | OUTPATIENT
Start: 2024-04-07 | End: 2024-04-10

## 2024-04-07 RX ORDER — MAGNESIUM OXIDE 400 MG/1
400 TABLET ORAL ONCE
Status: COMPLETED | OUTPATIENT
Start: 2024-04-07 | End: 2024-04-07

## 2024-04-07 NOTE — PROGRESS NOTES
A+Ox4  RA  Tele SR  R chest port in place  Up x 1 w/walker, unsteady at times  Accucheck QID. Insulin given per sliding scale  No c/o pain or s/s of distress  Safety/fall precautions in place  Updated patient on plan of care

## 2024-04-07 NOTE — DISCHARGE PLANNING
NURSING DISCHARGE NOTE    Discharged Home via Wheelchair.  Accompanied by RN  Belongings Taken by patient/family.      Discharge order in place. Pt was cleared for discharge by hospitalist, hem/onc, and neurologist. Discharge education, medications, and follow-ups reviewed with patient and pt's wife. Pt verbalized understanding. Port de-accessed.

## 2024-04-07 NOTE — OCCUPATIONAL THERAPY NOTE
OCCUPATIONAL THERAPY EVALUATION - INPATIENT     Room Number: 3620/3620-A  Evaluation Date: 4/7/2024  Type of Evaluation: Initial  Presenting Problem: slurred speech    Physician Order: IP Consult to Occupational Therapy  Reason for Therapy: ADL/IADL Dysfunction and Discharge Planning    OCCUPATIONAL THERAPY ASSESSMENT   Patient is currently functioning below baseline with toileting, bathing, lower body dressing, transfers, static standing balance, and dynamic standing balance. Prior to admission, patient's baseline is independent without device.  Patient is requiring contact guard assist as a result of the following impairments: impaired standing balance, impaired coordination, impaired motor planning, decreased safety awareness, and visual deficits. Occupational Therapy will continue to follow for duration of hospitalization.    Patient will benefit from continued skilled OT Services at discharge to promote functional independence and safety with additional support and return home with Day Rehab      History Related to Current Admission: Patient is a 73 year old male admitted on 4/5/2024 from home for slurred speech, difficulty walking. MRI revealed intraparenchymal and LM disease. Oncology consulted and recommending OP palliative RT.      Co-Morbidities : neuroendocrine CA, cardiomyopathy, HTN, gout, DM2, HL, BPH    Imaging:  MRI brain 4/5/24  1. No acute intracranial abnormality identified.   2. 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  The largest focal metastatic lesion is in the high left frontal lobe measuring up to 12 x 9 mm on image 163 series 11 with mild associated edema. A cortically based metastatic   lesion in the right parietal lobe on image 172 series 11 measures 5 x 4 mm which also demonstrates gradient susceptibility which may be due to underlying calcification and/or blood products.  A nodular focus of metastatic disease along the right   occipital lobe is noted on  image 117 series 11 measuring up to 4 x 3 mm.  A nodular focus of leptomeningeal metastatic disease along the right occipital lobe on image 103 series 11 measures up to 5 x 3 mm.  Metastatic disease along the cortex of the   posterior left temporal lobe is best seen on image 80 series 11 measuring up to 7 x 5 mm. 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    3. Minimal chronic microvascular ischemic changes in the cerebral white matter.      Recommendations for nursing staff:   Transfers: MIN A with RW  Toileting location: toilet     EVALUATION SESSION:  Patient Start of Session: semi-supine   FUNCTIONAL TRANSFER ASSESSMENT  Sit to Stand: Edge of Bed; Chair  Edge of Bed: Contact Guard Assist  Chair: Contact Guard Assist  Toilet Transfer: Minimal Assist (w/ RW, cueing needed for safety and body mechanics)    BED MOBILITY  Supine to Sit : Stand-by Assist  Scooting: SBA    BALANCE ASSESSMENT  Static Sitting: Independent  Sitting Bilateral: Stand-by Assist  Static Standing: Contact Guard Assist  Standing Unilateral: Contact Guard Assist    FUNCTIONAL ADL ASSESSMENT  Grooming Seated: Stand-by Assist (dec'd EDUARD gross motor coordination)  LB Dressing Seated: Stand-by Assist  LB Dressing Standing: Contact Guard Assist  Toileting Seated: Stand-by Assist  Toileting Standing: Contact Guard Assist      ACTIVITY TOLERANCE: Pt on room air and denies SOB, dizziness or lightheadedness throughout session. No significant change in vitals noted.                          O2 SATURATIONS       COGNITION  Arousal/Alertness:  appropriate responses to stimuli  Orientation Level:  oriented x4  Following Commands:  follows all commands and directions without  difficulty  Motor Planning: impaired  Awareness of Deficits:  decreased awareness of deficits  COGNITION ASSESSMENTS       VISION  Pt wears glasses at baseline. Reports no visual deficits at this moment, however describes episode last night of blurry and double vision. Double vision was improved when he closed one eye. Has since resolved. On exam, Pt was able to track smoothly in all planes, peripheral and visual fields intact    PERCEPTION  Overall Perception Status:   WFL - within functional limits    Communication: Pt continues to demonstrate slurred speech but able to be understood    Behavioral/Emotional/Social: Pt is pleasant and agreeable to therapy.    UPPER EXTREMITY  ROM: within functional limits   Strength: is within functional limits   Coordination  Gross motor: BUE impaired   Fine motor: WFL  Sensation: Light touch:  intact    Neurological findings:  Neurological Findings: Coordination - Finger to Nose;Coordination - Rapid Alternating Movement;Coordination - Finger Opposition  Coordination - Finger to Nose: Left decreased speed;Left decreased accuracy;Right decreased speed;Right decreased accuracy  Coordination - Rapid Alternating Movement: Symmetrical  Coordination - Finger Opposition: Symmetrical       EDUCATION PROVIDED  Patient: Plan of Care; Role of Occupational Therapy; Adaptive Equipment Recommendations; DME Recommendations; Functional Transfer Techniques; Fall Prevention; Posture/Positioning; Energy Conservation; Compensatory ADL Techniques; Proper Body Mechanics  Patient's Response to Education: Verbalized Understanding; Returned Demonstration  Family/Caregiver: Role of Occupational Therapy; Plan of Care; Adaptive Equipment Recommendations; DME Recommendations; Functional Transfer Techniques; Fall Prevention; Posture/Positioning; Energy Conservation; Compensatory ADL Techniques; Proper Body Mechanics  Family/Caregiver's Response to Education: Verbalized Understanding    Equipment used:  RW  Demonstrates functional use, Would benefit from additional trial      Therapist comments: Supportive spouse present and included in all education. Pt demonstrating impaired BUE/BLE gross motor coordination and motor planning. Pt benefits from cueing for safety and body mechanics during household distance mobility with RW. Discussed home safety and fall prevention, AE/DME recommendations, and compensatory ADL strategies. Message sent to  about possibility of getting pt on waitlist for day rehab. Per EMR and Pt/wife, focus will be on radiation plan, however pt very concerned re: speech and functional deficits and feel he would benefit from intensive OP therapy.     Patient End of Session: Up in chair;With  staff;Needs met;Call light within reach;RN aware of session/findings;All patient questions and concerns addressed;Alarm set;Family present    OCCUPATIONAL PROFILE    HOME SITUATION  Type of Home: House  Home Layout: Multi-level (will stay on one level)  Lives With: Family (wife and son)    Toilet and Equipment: Standard height toilet  Shower/Tub and Equipment: Walk-in shower  Other Equipment:  (RW)    Occupation/Status: retired GM   Hand Dominance: Right  Drives: Yes  Patient Regularly Uses: Glasses    Prior Level of Function: Pt lives with his wife and adult son and is typically independent with all aspects of mobility and self-cares without device. When he was feeling well, Pt was walking 2 miles per day and continued to work as a  out of his garage. Wife reported she typically works but she or the son will be home with him at all times. Planning to arrange place for him to sleep one one floor with 1/2 bath. Will have to go upstairs to access WIS, discussed waiting got HHPT to work on stairs.     SUBJECTIVE   \"I'm most worried about the speech.\"    PAIN ASSESSMENT  Ratin          OBJECTIVE  Precautions: Bed/chair alarm  Fall Risk: High fall risk      ASSESSMENTS  AM-PAC  ‘6-Clicks’ Inpatient Daily Activity Short Form  -   Putting on and taking off regular lower body clothing?: A Little  -   Bathing (including washing, rinsing, drying)?: A Lot  -   Toileting, which includes using toilet, bedpan or urinal? : A Little  -   Putting on and taking off regular upper body clothing?: None  -   Taking care of personal grooming such as brushing teeth?: A Little  -   Eating meals?: None    AM-PAC Score:  Score: 19  Approx Degree of Impairment: 42.8%  Standardized Score (AM-PAC Scale): 40.22    ADDITIONAL TESTS     NEUROLOGICAL FINDINGS  Coordination - Finger to Nose: Left decreased speed; Left decreased accuracy; Right decreased speed; Right decreased accuracy  Coordination - Rapid Alternating Movement: Symmetrical  Coordination - Finger Opposition: Symmetrical       PLAN  OT Treatment Plan: Balance activities;Energy conservation/work simplification techniques;ADL training;Functional transfer training;UE strengthening/ROM;Endurance training;Patient/Family education;Patient/Family training;Equipment eval/education;Neuromuscluar reeducation;Compensatory technique education  Rehab Potential : Good  Frequency: 3-5x/week  Number of Visits to Meet Established Goals: 5    ADL Goals   Patient will perform grooming: with stand by assist and while standing at sink  Patient will perform upper body dressing:  with setup  Patient will perform lower body dressing:  with stand by assist  Patient will perform toileting: with stand by assist    Functional Transfer Goals  Patient will transfer from sit to stand:  with stand by assist  Patient will transfer to toilet:  with supervision    UE Exercise Program Goal  Patient will be supervision with bilateral AROM coordination HEP (home exercise program).      Patient Evaluation Complexity Level:   Occupational Profile/Medical History MODERATE - Expanded review of history including review of medical or therapy record   Specific performance deficits impacting  engagement in ADL/IADL MODERATE  3 - 5 performance deficits   Client Assessment/Performance Deficits MODERATE - Comorbidities and min to mod modifications of tasks    Clinical Decision Making MODERATE - Analysis of occupational profile, detailed assessments, several treatment options    Overall Complexity MODERATE     OT Session Time: 25 minutes  Therapeutic Activity: 10 minutes

## 2024-04-07 NOTE — PLAN OF CARE
Assumed pt care this morning at 0730.   Pt is A&OX4. Wears glasses. Occ slurred speech, not new.   On room air, VSS.  Tele: NSR  Denies having pain.   On oral prednisone.   R port cath with blood return. Unit draw.   Carb controlled diet with QID accuchecks.  Contact guard with ambulation.   Bed in lowest position, call light within reach.     Problem: Patient/Family Goals  Goal: Patient/Family Long Term Goal  Description: Patient's Long Term Goal: Discharge home     Interventions:  - Follow up with discharge instructions  - See additional Care Plan goals for specific interventions  Outcome: Progressing  Goal: Patient/Family Short Term Goal  Description: Patient's Short Term Goal: Safety    Interventions:   - Free from falls.   - See additional Care Plan goals for specific interventions  Outcome: Progressing     Problem: RISK FOR INFECTION - ADULT  Goal: Absence of fever/infection during anticipated neutropenic period  Description: INTERVENTIONS  - Monitor WBC  - Administer growth factors as ordered  - Implement neutropenic guidelines  Outcome: Progressing     Problem: SAFETY ADULT - FALL  Goal: Free from fall injury  Description: INTERVENTIONS:  - Assess pt frequently for physical needs  - Identify cognitive and physical deficits and behaviors that affect risk of falls.  - [place] fall precautions as indicated by assessment.  - Educate pt/family on patient safety including physical limitations  - Instruct pt to call for assistance with activity based on assessment  - Modify environment to reduce risk of injury  - Provide assistive devices as appropriate  - Consider OT/PT consult to assist with strengthening/mobility  - Encourage toileting schedule  Outcome: Progressing     Problem: DISCHARGE PLANNING  Goal: Discharge to home or other facility with appropriate resources  Description: INTERVENTIONS:  - Identify barriers to discharge w/pt and caregiver  - Include patient/family/discharge partner in discharge  planning  - Arrange for needed discharge resources and transportation as appropriate  - Identify discharge learning needs (meds, wound care, etc)  - Arrange for interpreters to assist at discharge as needed  - Consider post-discharge preferences of patient/family/discharge partner  - Complete POLST form as appropriate  - Assess patient's ability to be responsible for managing their own health  - Refer to Case Management Department for coordinating discharge planning if the patient needs post-hospital services based on physician/LIP order or complex needs related to functional status, cognitive ability or social support system  Outcome: Progressing

## 2024-04-07 NOTE — PROGRESS NOTES
[facility]  Follow up note    Renato Hall Patient Status:  Inpatient    1950 MRN [id number]   Location [facility] 3NE-A Attending Jose Matt MD   Hosp Day # 2 PCP Deyanira Resendez MD     Interval History--  Mr. Hall seen again today with his SO.  No major changes overnight.  Hemoglobin is stable.  No improvement clinically on dexamethasone x 2 doses.  He notes he did not sleep well.  Waiting for breakfast.    We noted hemoglobin stable at 9 grams.  No BRBPR, no nausea or vomiting.    2024  Reason for Consultation:  Known to us    History of Present Illness:  Renato Hall is a a(n) 73 year old male with history of NET that presented to ED with slurred speech and difficulty walking.  He was recently admitted for GI bleeding and needed PRBCs and had a stapling procedure for gastric bleeding.    He was doing well at home but then note difficulty ambulating and he noted progressive worsening of this.  Slurred speech started one day PTA.    He was evaluated in ED yesterday and found to have a normal CT scan, but MRI showed intraparenchymal and LM disease.    Of note he was set to see Dr. Duran from Rad Onc next week for large ulcerated gastric mass with invasion to pancreas per last admission.    Given continued oozing during last admission, radiation considered an option.     History:  Past Medical History:   Diagnosis Date    Abnormal screening CT of heart 2005    calcium score 6    Abnormal screening CT of heart 2010    calcium score of 18    Abnormal screening CT of heart 2015    calcium score 53    Adenomatous colon polyp 2014    Allergic rhinitis due to pollen     BPH (benign prostatic hyperplasia)     Cancer (HCC)     Neuroendocrine CA/Pancreatic    Cardiomyopathy (HCC)     2D Echo 23    COVID 2022    No hospitalization. Harsh coughing    Degeneration of cervical intervertebral disc     Diverticula of colon     Essential hypertension, benign     GOUT      High blood pressure     High cholesterol     History of blood transfusion     No reaction    Hypertrophic cardiomyopathy (HCC)     Personal history of antineoplastic chemotherapy 12/05/2023    Last treatment    Pure hypercholesterolemia     Tinnitus, bilateral     Type II or unspecified type diabetes mellitus without mention of complication, not stated as uncontrolled     Visual impairment     Glasses     Past Surgical History:   Procedure Laterality Date    CHOLECYSTECTOMY  06/21/1988    COLONOSCOPY  04/21/2008    tiny polyps    COLONOSCOPY  08/27/2014    Procedure: COLONOSCOPY;  Surgeon: Liang Quevedo MD;  Location:  ENDOSCOPY    COLONOSCOPY N/A 09/20/2017    Procedure: COLONOSCOPY;  Surgeon: Liang Quevedo MD;  Location:  ENDOSCOPY    COLONOSCOPY      PERIPHERAL VASCULAR SCREENING HISTORICAL CONV Bilateral 05/22/2017    mild carotid narrowing, PAD screen    UPPER GI ENDOSCOPY,EXAM      VASCULAR LAB - [facility] Bilateral 11/01/2011    PAD screening normal     Family History   Problem Relation Age of Onset    Arthritis Father         TKA    Hypertension Father     Diabetes Father     Obesity Father     Cancer Mother 70        colon, ?uterine    Genito-Urinary Disorder Mother         hysterectomy    Substance Abuse Son     Asthma Son     High Cholesterol Son     Gastro-Intestinal Disorder Brother         colon polyps, GERD    Hypertension Brother     Neurological Disorder Daughter         MS    Diabetes Daughter     Hypertension Sister     Diabetes Sister     Lipids Sister       reports that he quit smoking about 38 years ago. His smoking use included cigarettes. He has a 17 pack-year smoking history. He has never used smokeless tobacco. He reports that he does not drink alcohol and does not use drugs.    Allergies:  Allergies   Allergen Reactions    Amlodipine SWELLING     Ankle swelling on amlodipine.       Medications:    Current Facility-Administered Medications:     heparin (Porcine) 100 Units/mL lock  flush 500 Units, 5 mL, Intravenous, PRN    allopurinol (Zyloprim) tab 300 mg, 300 mg, Oral, Daily    carvedilol (Coreg) tab 25 mg, 25 mg, Oral, BID with meals    fenofibrate micronized (Lofibra) cap 67 mg, 67 mg, Oral, Nightly    ondansetron (Zofran-ODT) disintegrating tab 4 mg, 4 mg, Oral, Q8H PRN    pantoprazole (Protonix) DR tab 40 mg, 40 mg, Oral, BID AC    rosuvastatin (Crestor) tab 10 mg, 10 mg, Oral, Nightly    sucralfate (Carafate) tab 1 g, 1 g, Oral, TID AC    glucose (Dex4) 15 GM/59ML oral liquid 15 g, 15 g, Oral, Q15 Min PRN **OR** glucose (Glutose) 40% oral gel 15 g, 15 g, Oral, Q15 Min PRN **OR** glucose-vitamin C (Dex-4) chewable tab 4 tablet, 4 tablet, Oral, Q15 Min PRN **OR** dextrose 50% injection 50 mL, 50 mL, Intravenous, Q15 Min PRN **OR** glucose (Dex4) 15 GM/59ML oral liquid 30 g, 30 g, Oral, Q15 Min PRN **OR** glucose (Glutose) 40% oral gel 30 g, 30 g, Oral, Q15 Min PRN **OR** glucose-vitamin C (Dex-4) chewable tab 8 tablet, 8 tablet, Oral, Q15 Min PRN    acetaminophen (Tylenol Extra Strength) tab 500 mg, 500 mg, Oral, Q4H PRN    ondansetron (Zofran) 4 MG/2ML injection 4 mg, 4 mg, Intravenous, Q6H PRN    metoclopramide (Reglan) 5 mg/mL injection 10 mg, 10 mg, Intravenous, Q8H PRN    insulin aspart (NovoLOG) 100 Units/mL FlexPen 1-5 Units, 1-5 Units, Subcutaneous, TID AC and HS    Review of Systems:  A 10-point review of systems was done with pertinent positives and negatives per the HPI.    Physical Exam:   Blood pressure 149/70, pulse 67, temperature 97.7 °F (36.5 °C), temperature source Oral, resp. rate 18, height 5' 7\" (1.702 m), weight 195 lb (88.5 kg), SpO2 100%.   General: Patient is alert and oriented x 3, not in acute distress.   HEENT: EOMs intact. PERRL. Oropharynx is clear.    Neck: No JVD. No palpable lymphadenopathy. Neck is supple.   Chest: Clear to auscultation.   Heart: Regular rate and rhythm.    Abdomen: Soft, non tender with good bowel sounds.   Extremities: Pedal pulses are  present. No edema.   Neurological: Grossly intact.    Lymphatics: There is no palpable lymphadenopathy throughout in the cervical,            supraclavicular, axillary, or inguinal regions.     Laboratory Data:    Lab Results   Component Value Date    WBC 5.1 04/07/2024    HGB 9.1 04/07/2024    HCT 26.7 04/07/2024    .0 04/07/2024    CREATSERUM 0.90 04/07/2024    BUN 12 04/07/2024     04/07/2024    K 4.2 04/07/2024     04/07/2024    CO2 28.0 04/07/2024     04/07/2024    CA 9.1 04/07/2024    MG 1.6 04/07/2024       Imaging:  MRI BRAIN (W+WO) (CPT=70553)    Result Date: 4/5/2024  CONCLUSION:   1. No acute intracranial abnormality identified.  2. 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  The largest focal metastatic lesion is in the high left frontal lobe measuring up to 12 x 9 mm on image 163 series 11 with mild associated edema. A cortically based metastatic lesion in the right parietal lobe on image 172 series 11 measures 5 x 4 mm which also demonstrates gradient susceptibility which may be due to underlying calcification and/or blood products.  A nodular focus of metastatic disease along the right occipital lobe is noted on image 117 series 11 measuring up to 4 x 3 mm.  A nodular focus of leptomeningeal metastatic disease along the right occipital lobe on image 103 series 11 measures up to 5 x 3 mm.  Metastatic disease along the cortex of the posterior left temporal lobe is best seen on image 80 series 11 measuring up to 7 x 5 mm. There is moderate to severe leptomeningeal metastatic disease along the cerebellar folia as well as along the surface of the brainstem, within the internal auditory  canals along the 7/8 nerve complexes, along the cisternal segments of the trigeminal nerves, and along the optic chiasm as well as the pre chiasmatic optic nerves.   3. Minimal chronic microvascular ischemic changes in the cerebral white matter.  Please see above for  further details.  LOCATION:  [id number]    Dictated by (CST): Floyd Pagan MD on 4/05/2024 at 2:12 PM     Finalized by (CST): Floyd Pagan MD on 4/05/2024 at 2:23 PM       XR CHEST AP PORTABLE  (CPT=71045)    Result Date: 4/5/2024  CONCLUSION:   Stable cardiac and mediastinal contours.  No pulmonary edema or focal airspace consolidation.  The pleural spaces are clear.  Right subclavian chest port terminates in the mid SVC.   LOCATION:  [facility]      Dictated by (CST): Sherrie Gentile MD on 4/05/2024 at 10:15 AM     Finalized by (CST): Sherrie Gentile MD on 4/05/2024 at 10:15 AM       CT BRAIN OR HEAD (94118)    Result Date: 4/5/2024  CONCLUSION:  No acute intracranial abnormality.    LOCATION:  [facility]   Dictated by (CST): Alphonso Pantoja MD on 4/05/2024 at 9:47 AM     Finalized by (CST): Alphonso Pantoja MD on 4/05/2024 at 9:49 AM       XR CHEST AP PORTABLE  (CPT=71045)    Result Date: 3/24/2024  CONCLUSION:  Hyperinflation both lungs consistent with COPD.  Slight infiltrate versus atelectasis in the lung bases.  Port-A-Cath tip SVC.  No pneumothorax or pleural effusion.   LOCATION:  [facility]      Dictated by (CST): Rachael Mock MD on 3/24/2024 at 2:23 PM     Finalized by (CST): Rachael Mock MD on 3/24/2024 at 2:24 PM         Impression and Plan:    Patient Active Problem List   Diagnosis    Essential hypertension, benign    Pure hypercholesterolemia    Hyperuricemia    Allergic rhinitis due to pollen    Sensorineural hearing loss, bilateral    Diabetes mellitus type II, non insulin dependent (HCC)    Hypertrophic cardiomyopathy (HCC)    Nasopalatine cyst    Stage 3a chronic kidney disease (HCC)    Rotator cuff tendinitis, right    Hypercholesterolemia with hypertriglyceridemia    BMI 40.0-44.9, adult (HCC)    Gastric mass    Rectal bleeding    Leukocytosis, unspecified type    Anemia, unspecified type    Hemoglobin drop    MDS (myelodysplastic syndrome) (HCC)    Hyponatremia    Anemia    Azotemia    Hyperglycemia     Gastrointestinal hemorrhage, unspecified gastrointestinal hemorrhage type    Thrombocytopenia (HCC)    Slurred speech    Gait instability     Hematologic/Oncologic History:  Digestive issues led to EGD and colonoscopy 2/13/2023  Finding of a large gastric mass  Biopsy proven poorly differentiated neuroendocrine carcinoma  Staging scans show lymphadenopathy, both local and distant, and invasion of tumor into pancreas  Chemotherapy with carboplatin and etoposide 3/21/2023-7/6/2023  Carboplatin reduced during cycles 4-6 due to shortage  Final cycle 8/22/2023  Symptomatic progression in November 2023  Topotecan 11/29/2023-  Recently admitted at ED with GI bleeding needing PRBCs and thought to be due to gastric tumor invading to pancreas.   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   Recent admission about 10 days ago with GI bleeding, needing PRBCs  Now presenting with neurologic symptoms and found to have LM and intraparenchymal disease on MRI  Had been previously set up to see [facility] to consider gastric radiation for bleeding issues--this is scheduled on Tuesday next week.    Plan  Dex 4 q 12 hours.  Monitor hemoglobin very closely in light of recent events.   He is on PPI  Plan to continue this dosing.   He has not had any clinical improvement and we noted he may not.  I am hesitant to increase steroid dosing given his very recent bleeding issues.  We noted only mild edema noted on MRI and thus steroids may not help much overall.   No role for inpatient chemotherapy  He will need palliative RT, likely WBRT given MRI results.  Ok for discharge from our perspective today on current steroid dosing.   We discussed that dex dosing may be adjusted per Rad Onc.    Thank you for allowing me to participate in the care of  your patient.    Andrew Rivers MD

## 2024-04-07 NOTE — CM/SW NOTE
04/07/24 1200   Discharge disposition   Expected discharge disposition Home-Health   Discharge transportation Private car     Notified by RN that pt discharging today    Pt is a 73 year old male admitted with slurred speech.  Neuro and Heme/Onc consulted and plan is for palliative RT as outpt.    PT/OT evaluated pt and Anticipated therapy need: Home with Home Healthcare  In addition, notified by OT that Day Rehab is recommended.    OT Eval:    HOME SITUATION  Type of Home: House  Home Layout: Multi-level (will stay on one level)  Lives With: Family (wife and son)     Toilet and Equipment: Standard height toilet  Shower/Tub and Equipment: Walk-in shower  Other Equipment:  (RW)     Occupation/Status: retired GM   Hand Dominance: Right  Drives: Yes  Patient Regularly Uses: Glasses     Prior Level of Function: Pt lives with his wife and adult son and is typically independent with all aspects of mobility and self-cares without device. When he was feeling well, Pt was walking 2 miles per day and continued to work as a  out of his garage. Wife reported she typically works but she or the son will be home with him at all times. Planning to arrange place for him to sleep one one floor with 1/2 bath. Will have to go upstairs to access WIS, discussed waiting got HHPT to work on stairs.         Met w/pt and his wife at the bedside and they are agreeable to referrals for both.  Day Rehab will likely take several weeks to get an appointment so HH will be needed in the meantime.    HH list given to pt and wife and instructed wife to call this writer once choice is made    Addendum 1615  Called wife at home to follow up on HH choice and she hasn't reviewed the list yet and will call this writer tomorrow with choice    / to remain available for support and/or discharge planning.     Janneth WARNERA MSN, RN CTL/  [id number]

## 2024-04-15 NOTE — HOME CARE LIAISON
Received referral via [facility] for Home Health services. Son called us and requested services with [facility]. Will continue to follow.

## 2024-04-25 ENCOUNTER — HOSPITAL ENCOUNTER (INPATIENT)
Facility: HOSPITAL | Age: 74
LOS: 4 days | Discharge: HOSPICE | End: 2024-04-30
Attending: EMERGENCY MEDICINE | Admitting: HOSPITALIST
Payer: MEDICARE

## 2024-04-25 ENCOUNTER — APPOINTMENT (OUTPATIENT)
Dept: CT IMAGING | Facility: HOSPITAL | Age: 74
DRG: 377 | End: 2024-04-25
Attending: EMERGENCY MEDICINE
Payer: MEDICARE

## 2024-04-25 ENCOUNTER — HOSPITAL ENCOUNTER (INPATIENT)
Facility: HOSPITAL | Age: 74
LOS: 4 days | Discharge: HOSPICE | DRG: 377 | End: 2024-04-30
Attending: EMERGENCY MEDICINE | Admitting: HOSPITALIST
Payer: MEDICARE

## 2024-04-25 ENCOUNTER — APPOINTMENT (OUTPATIENT)
Dept: CT IMAGING | Facility: HOSPITAL | Age: 74
End: 2024-04-25
Attending: EMERGENCY MEDICINE
Payer: MEDICARE

## 2024-04-25 DIAGNOSIS — N17.9 AKI (ACUTE KIDNEY INJURY) (HCC): ICD-10-CM

## 2024-04-25 DIAGNOSIS — C7B.8 NEUROENDOCRINE CARCINOMA METASTATIC TO BRAIN (HCC): ICD-10-CM

## 2024-04-25 DIAGNOSIS — K92.1 MELANOTIC STOOLS: Primary | ICD-10-CM

## 2024-04-25 DIAGNOSIS — C7A.8 NEUROENDOCRINE CARCINOMA METASTATIC TO BRAIN (HCC): ICD-10-CM

## 2024-04-25 DIAGNOSIS — E87.1 HYPONATREMIA: ICD-10-CM

## 2024-04-25 LAB
ALBUMIN SERPL-MCNC: 2.6 G/DL (ref 3.4–5)
ALBUMIN/GLOB SERPL: 0.8 {RATIO} (ref 1–2)
ALP LIVER SERPL-CCNC: 107 U/L
ALT SERPL-CCNC: 31 U/L
ANION GAP SERPL CALC-SCNC: 9 MMOL/L (ref 0–18)
ANTIBODY SCREEN: NEGATIVE
APTT PPP: 28.4 SECONDS (ref 23.3–35.6)
AST SERPL-CCNC: 38 U/L (ref 15–37)
BASOPHILS # BLD AUTO: 0.02 X10(3) UL (ref 0–0.2)
BASOPHILS NFR BLD AUTO: 0.1 %
BILIRUB SERPL-MCNC: 0.9 MG/DL (ref 0.1–2)
BILIRUB UR QL STRIP.AUTO: NEGATIVE
BUN BLD-MCNC: 64 MG/DL (ref 9–23)
CALCIUM BLD-MCNC: 8.5 MG/DL (ref 8.5–10.1)
CHLORIDE SERPL-SCNC: 99 MMOL/L (ref 98–112)
CLARITY UR REFRACT.AUTO: CLEAR
CO2 SERPL-SCNC: 20 MMOL/L (ref 21–32)
COLOR UR AUTO: YELLOW
CREAT BLD-MCNC: 1.38 MG/DL
EGFRCR SERPLBLD CKD-EPI 2021: 54 ML/MIN/1.73M2 (ref 60–?)
EOSINOPHIL # BLD AUTO: 0 X10(3) UL (ref 0–0.7)
EOSINOPHIL NFR BLD AUTO: 0 %
ERYTHROCYTE [DISTWIDTH] IN BLOOD BY AUTOMATED COUNT: 15.8 %
GLOBULIN PLAS-MCNC: 3.1 G/DL (ref 2.8–4.4)
GLUCOSE BLD-MCNC: 213 MG/DL (ref 70–99)
GLUCOSE UR STRIP.AUTO-MCNC: NORMAL MG/DL
HCT VFR BLD AUTO: 41.8 %
HGB BLD-MCNC: 14.3 G/DL
HYALINE CASTS #/AREA URNS AUTO: PRESENT /LPF
IMM GRANULOCYTES # BLD AUTO: 0.12 X10(3) UL (ref 0–1)
IMM GRANULOCYTES NFR BLD: 0.7 %
INR BLD: 1.11 (ref 0.8–1.2)
KETONES UR STRIP.AUTO-MCNC: NEGATIVE MG/DL
LACTATE SERPL-SCNC: 1.6 MMOL/L (ref 0.4–2)
LEUKOCYTE ESTERASE UR QL STRIP.AUTO: NEGATIVE
LYMPHOCYTES # BLD AUTO: 0.21 X10(3) UL (ref 1–4)
LYMPHOCYTES NFR BLD AUTO: 1.3 %
MCH RBC QN AUTO: 30.5 PG (ref 26–34)
MCHC RBC AUTO-ENTMCNC: 34.2 G/DL (ref 31–37)
MCV RBC AUTO: 89.1 FL
MONOCYTES # BLD AUTO: 0.68 X10(3) UL (ref 0.1–1)
MONOCYTES NFR BLD AUTO: 4.2 %
NEUTROPHILS # BLD AUTO: 15.04 X10 (3) UL (ref 1.5–7.7)
NEUTROPHILS # BLD AUTO: 15.04 X10(3) UL (ref 1.5–7.7)
NEUTROPHILS NFR BLD AUTO: 93.7 %
NITRITE UR QL STRIP.AUTO: NEGATIVE
OSMOLALITY SERPL CALC.SUM OF ELEC: 291 MOSM/KG (ref 275–295)
PH UR STRIP.AUTO: 5 [PH] (ref 5–8)
PLATELET # BLD AUTO: 162 10(3)UL (ref 150–450)
POTASSIUM SERPL-SCNC: 4.6 MMOL/L (ref 3.5–5.1)
PROT SERPL-MCNC: 5.7 G/DL (ref 6.4–8.2)
PROT UR STRIP.AUTO-MCNC: NEGATIVE MG/DL
PROTHROMBIN TIME: 14.4 SECONDS (ref 11.6–14.8)
RBC # BLD AUTO: 4.69 X10(6)UL
RBC #/AREA URNS AUTO: >10 /HPF
RH BLOOD TYPE: POSITIVE
SODIUM SERPL-SCNC: 128 MMOL/L (ref 136–145)
SP GR UR STRIP.AUTO: 1.03 (ref 1–1.03)
UROBILINOGEN UR STRIP.AUTO-MCNC: NORMAL MG/DL
WBC # BLD AUTO: 16.1 X10(3) UL (ref 4–11)

## 2024-04-25 PROCEDURE — 74177 CT ABD & PELVIS W/CONTRAST: CPT | Performed by: EMERGENCY MEDICINE

## 2024-04-25 PROCEDURE — 83605 ASSAY OF LACTIC ACID: CPT | Performed by: EMERGENCY MEDICINE

## 2024-04-25 PROCEDURE — 86900 BLOOD TYPING SEROLOGIC ABO: CPT | Performed by: EMERGENCY MEDICINE

## 2024-04-25 PROCEDURE — C9113 INJ PANTOPRAZOLE SODIUM, VIA: HCPCS | Performed by: EMERGENCY MEDICINE

## 2024-04-25 PROCEDURE — 85730 THROMBOPLASTIN TIME PARTIAL: CPT | Performed by: EMERGENCY MEDICINE

## 2024-04-25 PROCEDURE — 85025 COMPLETE CBC W/AUTO DIFF WBC: CPT | Performed by: EMERGENCY MEDICINE

## 2024-04-25 PROCEDURE — 81001 URINALYSIS AUTO W/SCOPE: CPT | Performed by: EMERGENCY MEDICINE

## 2024-04-25 PROCEDURE — 85610 PROTHROMBIN TIME: CPT | Performed by: EMERGENCY MEDICINE

## 2024-04-25 PROCEDURE — 80053 COMPREHEN METABOLIC PANEL: CPT | Performed by: EMERGENCY MEDICINE

## 2024-04-25 PROCEDURE — 96361 HYDRATE IV INFUSION ADD-ON: CPT

## 2024-04-25 PROCEDURE — 99285 EMERGENCY DEPT VISIT HI MDM: CPT

## 2024-04-25 PROCEDURE — 87040 BLOOD CULTURE FOR BACTERIA: CPT | Performed by: EMERGENCY MEDICINE

## 2024-04-25 PROCEDURE — 86850 RBC ANTIBODY SCREEN: CPT | Performed by: EMERGENCY MEDICINE

## 2024-04-25 PROCEDURE — 96374 THER/PROPH/DIAG INJ IV PUSH: CPT

## 2024-04-25 PROCEDURE — 86901 BLOOD TYPING SEROLOGIC RH(D): CPT | Performed by: EMERGENCY MEDICINE

## 2024-04-25 PROCEDURE — 82272 OCCULT BLD FECES 1-3 TESTS: CPT

## 2024-04-25 PROCEDURE — 36415 COLL VENOUS BLD VENIPUNCTURE: CPT

## 2024-04-25 NOTE — ED PROVIDER NOTES
Patient Seen in: [facility] Emergency Department      History     Chief Complaint   Patient presents with    GI Bleeding     Stated Complaint: GI bleed    Subjective:   HPI    73-year-old male with a past medical history as below including metastatic gastric neuroendocrine tumor brought by wife due to dark black-colored stool x 2 starting this morning.  Patient is a poor historian with wife giving majority of history.  Wife states stool looks similar to when he was admitted last month with a GI bleed.  States first episode was black and tarry and second episode was more loose like diarrhea.  No gross blood noted.  Patient denies any abdominal pain, nausea or vomiting.  Wife states that he is weaker than normal.  He denies feeling dizzy or lightheaded.        Objective:   Past Medical History:    Abnormal screening CT of heart    calcium score 6    Abnormal screening CT of heart    calcium score of 18    Abnormal screening CT of heart    calcium score 53    Adenomatous colon polyp    Allergic rhinitis due to pollen    BPH (benign prostatic hyperplasia)    Cancer (HCC)    Neuroendocrine CA/Pancreatic    Cardiomyopathy (HCC)    2D Echo 9/22/23    COVID    No hospitalization. Harsh coughing    Degeneration of cervical intervertebral disc    Diverticula of colon    Essential hypertension, benign    GOUT    High blood pressure    High cholesterol    History of blood transfusion    No reaction    Hypertrophic cardiomyopathy (HCC)    Personal history of antineoplastic chemotherapy    Last treatment    Pure hypercholesterolemia    Tinnitus, bilateral    Type II or unspecified type diabetes mellitus without mention of complication, not stated as uncontrolled    Visual impairment    Glasses              Past Surgical History:   Procedure Laterality Date    Cholecystectomy  06/21/1988    Colonoscopy  04/21/2008    tiny polyps    Colonoscopy  08/27/2014    Procedure: COLONOSCOPY;  Surgeon: Liang Quevedo MD;   Location:  ENDOSCOPY    Colonoscopy N/A 2017    Procedure: COLONOSCOPY;  Surgeon: Liang Quevedo MD;  Location:  ENDOSCOPY    Colonoscopy      Peripheral vascular screening historical conv Bilateral 2017    mild carotid narrowing, PAD screen    Upper gi endoscopy,exam      Vascular lab - dmg Bilateral 2011    PAD screening normal                Social History     Socioeconomic History    Marital status:      Spouse name: Dayan    Number of children: 2    Years of education: 12   Occupational History    Occupation:      Comment: self employed   Tobacco Use    Smoking status: Former     Current packs/day: 0.00     Average packs/day: 1 pack/day for 17.0 years (17.0 ttl pk-yrs)     Types: Cigarettes     Start date: 1969     Quit date: 1986     Years since quittin.3    Smokeless tobacco: Never   Vaping Use    Vaping status: Never Used   Substance and Sexual Activity    Alcohol use: No     Alcohol/week: 0.0 standard drinks of alcohol    Drug use: No    Sexual activity: Yes     Partners: Female   Other Topics Concern     Service Yes     Comment: [employer]     Blood Transfusions No    Caffeine Concern Yes     Comment: coffee 1 cup a day    Occupational Exposure Yes     Comment: asbestos    Hobby Hazards No    Sleep Concern No    Stress Concern No    Weight Concern Yes    Special Diet No    Back Care No    Exercise Yes     Comment: walk    Bike Helmet No    Seat Belt Yes    Self-Exams No   Social History Narrative    Lives with wife, and son    [place]     Social Determinants of Health     Food Insecurity: No Food Insecurity (2024)    Food Insecurity     Food Insecurity: Never true   Transportation Needs: No Transportation Needs (2024)    Transportation Needs     Lack of Transportation: No   Housing Stability: Low Risk  (2024)    Housing Stability     Housing Instability: No              Review of Systems    Positive for stated complaint: GI  bleed  Other systems are as noted in HPI.  Constitutional and vital signs reviewed.      All other systems reviewed and negative except as noted above.    Physical Exam     ED Triage Vitals   BP 04/25/24 1815 97/74   Pulse 04/25/24 1814 89   Resp 04/25/24 1814 20   Temp 04/25/24 1830 97.4 °F (36.3 °C)   Temp src 04/25/24 1830 Temporal   SpO2 04/25/24 1814 99 %   O2 Device 04/25/24 1814 None (Room air)       Current:/67   Pulse 77   Temp 97.4 °F (36.3 °C) (Temporal)   Resp 12   Wt 77.1 kg   SpO2 100%   BMI 26.63 kg/m²         Physical Exam  Vitals and nursing note reviewed.   Constitutional:       General: He is not in acute distress.     Appearance: He is well-developed. He is not ill-appearing.   HENT:      Head: Normocephalic and atraumatic.      Mouth/Throat:      Mouth: Mucous membranes are moist.   Eyes:      General: No scleral icterus.     Extraocular Movements: Extraocular movements intact.   Cardiovascular:      Rate and Rhythm: Normal rate and regular rhythm.   Pulmonary:      Effort: Pulmonary effort is normal.      Breath sounds: Normal breath sounds.   Abdominal:      General: There is no distension.      Palpations: Abdomen is soft.      Tenderness: There is no abdominal tenderness.   Genitourinary:     Comments: Rectal: Loose dark brown/black, Hemoccult positive  Musculoskeletal:      Cervical back: Neck supple.   Skin:     General: Skin is warm and dry.      Capillary Refill: Capillary refill takes less than 2 seconds.   Neurological:      Mental Status: He is alert and oriented to person, place, and time.      GCS: GCS eye subscore is 4. GCS verbal subscore is 5. GCS motor subscore is 6.   Psychiatric:         Mood and Affect: Mood normal.         Behavior: Behavior normal.               ED Course     Labs Reviewed   COMP METABOLIC PANEL (14) - Abnormal; Notable for the following components:       Result Value    Glucose 213 (*)     Sodium 128 (*)     CO2 20.0 (*)     BUN 64 (*)      Creatinine 1.38 (*)     eGFR-Cr 54 (*)     AST 38 (*)     Total Protein 5.7 (*)     Albumin 2.6 (*)     A/G Ratio 0.8 (*)     All other components within normal limits   URINALYSIS WITH CULTURE REFLEX - Abnormal; Notable for the following components:    Blood Urine 1+ (*)     WBC Urine 6-10 (*)     RBC Urine >10 (*)     Squamous Epi. Cells Few (*)     Hyaline Casts Present (*)     All other components within normal limits   CBC W/ DIFFERENTIAL - Abnormal; Notable for the following components:    WBC 16.1 (*)     Neutrophil Absolute Prelim 15.04 (*)     Neutrophil Absolute 15.04 (*)     Lymphocyte Absolute 0.21 (*)     All other components within normal limits   PROTHROMBIN TIME (PT) - Normal   PTT, ACTIVATED - Normal   LACTIC ACID, PLASMA - Normal   CBC WITH DIFFERENTIAL WITH PLATELET    Narrative:     The following orders were created for panel order CBC With Differential With Platelet.  Procedure                               Abnormality         Status                     ---------                               -----------         ------                     CBC W/ DIFFERENTIAL[[id number]]          Abnormal            Final result                 Please view results for these tests on the individual orders.   TYPE AND SCREEN    Narrative:     The following orders were created for panel order Type and screen.  Procedure                               Abnormality         Status                     ---------                               -----------         ------                     ABORH (Blood Type)[[id number]]                               Final result               Antibody Screen[[id number]]                                  Final result                 Please view results for these tests on the individual orders.   ABORH (BLOOD TYPE)   ANTIBODY SCREEN   C. DIFFICILE(TOXIGENIC)PCR   GI STOOL PANEL BY PCR   BLOOD CULTURE   BLOOD CULTURE             CT ABDOMEN+PELVIS(CONTRAST ONLY)(CPT=74177)    Result Date:  4/25/2024  CONCLUSION:   1. Progression of malignancy, now with multiple hepatic metastatic lesions, and and infiltrating spreading malignant appearing process in the upper abdomen including gastroduodenal region and pancreas, with stranding and nodularity in the upper abdomen, extending downward along the left retroperitoneum para renal fascia and into the para-aortic and aortocaval retroperitoneum where there is lymphadenopathy.  2. Developing splenic infarcts, with compromise of the left splenic vein and splenic artery by tumor.  3. Left hydronephrosis, probably secondary to tumor implants around the proximal left ureter, with demonstration of enhancing soft tissue in this region, and dilation of the left ureter proximal to the lesion.  4. Small amount of likely malignant free fluid in the upper abdomen and trace amount of pelvis, but no large or drainable ascites.  5. Sclerotic metastatic bone lesions.   6. . No sign of bowel obstruction, or free air.  Small pleural effusions.  Small pericardial effusion.  Other findings as above.   LOCATION:  [facility]   Dictated by (CST): Handy Dhaliwal MD on 4/25/2024 at 10:18 PM     Finalized by (CST): Handy Dhaliwal MD on 4/25/2024 at 10:31 PM               MDM   73-year-old male with a past medical history as below including metastatic gastric neuroendocrine tumor brought by wife due to dark black-colored stool x 2 starting this morning.    Differential includes but is not limited to GI bleed, anemia, thrombocytopenia, coagulopathy    Labs show WBC 16 K with hemoglobin of 14.3 which is increased from previous of 9.1 on 4/7/2024.  Left hyponatremia with sodium 128 along with mild ROSCOE with BUNs/CR 60/1.38.  UA without evidence of UTI.    Independent interpretation abdomen/pelvis shows extensive  metastatic disease in the liver and upper abdomen.  Radiology report reviewed as above noting progression of malignancy along stranding and nodularity    Patient was treated with  Protonix and IV fluids.  Vitals have remained stable with normal heart rate and blood pressure 103/67.  Discussed with [facility] Dr. Rod who does not think there is much further can be done in terms of GI standpoint and bleeding is likely from friable tumor.  Will admit for further management.  Discussed with hospitalist Dr. Farrell.      Medical Decision Making  Amount and/or Complexity of Data Reviewed  Independent Historian: spouse     Details: HPI  Labs: ordered. Decision-making details documented in ED Course.  Radiology: ordered and independent interpretation performed. Decision-making details documented in ED Course.  Discussion of management or test interpretation with external provider(s): Hospitalist, GI    Risk  Decision regarding hospitalization.        Disposition and Plan     Clinical Impression:  1. Melanotic stools    2. ROSCOE (acute kidney injury) (HCC)    3. 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 Hyponatremia         Disposition:  Admit  4/25/2024 10:31 pm    Follow-up:  No follow-up provider specified.        Medications Prescribed:  Current Discharge Medication List                            Hospital Problems       Present on Admission  Date Reviewed: 3/21/2022            ICD-10-CM Noted POA    * (Principal) Melanotic stools K92.1 4/25/2024 Unknown

## 2024-04-25 NOTE — ED QUICK NOTES
Rounding Completed    Plan of Care reviewed. Waiting for Labs to result.  Elimination needs assessed.  Provided Comfort measures.    Bed is locked and in lowest position. Call light within reach.

## 2024-04-25 NOTE — ED INITIAL ASSESSMENT (HPI)
Pt had two bowel movements today. At 0700 tarry formed stool, pt went to radiation today and when pt returned home at 1300 he had an diarrhea episode of dark tarry stool. No C/o of abdominal pain but an increase in generalized weakness.

## 2024-04-26 PROBLEM — N17.9 AKI (ACUTE KIDNEY INJURY) (HCC): Status: ACTIVE | Noted: 2024-04-26

## 2024-04-26 PROBLEM — C7A.8 NEUROENDOCRINE CARCINOMA METASTATIC TO BRAIN (HCC): Status: ACTIVE | Noted: 2024-04-26

## 2024-04-26 PROBLEM — C7B.8 NEUROENDOCRINE CARCINOMA METASTATIC TO BRAIN (HCC): Status: ACTIVE | Noted: 2024-04-26

## 2024-04-26 LAB
GLUCOSE BLD-MCNC: 170 MG/DL (ref 70–99)
GLUCOSE BLD-MCNC: 188 MG/DL (ref 70–99)
GLUCOSE BLD-MCNC: 197 MG/DL (ref 70–99)
GLUCOSE BLD-MCNC: 227 MG/DL (ref 70–99)
GLUCOSE BLD-MCNC: 254 MG/DL (ref 70–99)

## 2024-04-26 PROCEDURE — 82962 GLUCOSE BLOOD TEST: CPT

## 2024-04-26 PROCEDURE — 92526 ORAL FUNCTION THERAPY: CPT

## 2024-04-26 PROCEDURE — 92610 EVALUATE SWALLOWING FUNCTION: CPT

## 2024-04-26 PROCEDURE — C9113 INJ PANTOPRAZOLE SODIUM, VIA: HCPCS | Performed by: STUDENT IN AN ORGANIZED HEALTH CARE EDUCATION/TRAINING PROGRAM

## 2024-04-26 RX ORDER — METOCLOPRAMIDE HYDROCHLORIDE 5 MG/ML
10 INJECTION INTRAMUSCULAR; INTRAVENOUS EVERY 8 HOURS PRN
Status: DISCONTINUED | OUTPATIENT
Start: 2024-04-26 | End: 2024-04-30

## 2024-04-26 RX ORDER — PROCHLORPERAZINE MALEATE 10 MG
10 TABLET ORAL EVERY 6 HOURS PRN
COMMUNITY

## 2024-04-26 RX ORDER — NICOTINE POLACRILEX 4 MG
15 LOZENGE BUCCAL
Status: DISCONTINUED | OUTPATIENT
Start: 2024-04-26 | End: 2024-04-30

## 2024-04-26 RX ORDER — DEXTROSE MONOHYDRATE 25 G/50ML
50 INJECTION, SOLUTION INTRAVENOUS
Status: DISCONTINUED | OUTPATIENT
Start: 2024-04-26 | End: 2024-04-30

## 2024-04-26 RX ORDER — ONDANSETRON 2 MG/ML
4 INJECTION INTRAMUSCULAR; INTRAVENOUS EVERY 6 HOURS PRN
Status: DISCONTINUED | OUTPATIENT
Start: 2024-04-26 | End: 2024-04-30

## 2024-04-26 RX ORDER — POLYETHYLENE GLYCOL 3350 17 G/17G
17 POWDER, FOR SOLUTION ORAL DAILY PRN
Status: DISCONTINUED | OUTPATIENT
Start: 2024-04-26 | End: 2024-04-30

## 2024-04-26 RX ORDER — BISACODYL 10 MG
10 SUPPOSITORY, RECTAL RECTAL
Status: DISCONTINUED | OUTPATIENT
Start: 2024-04-26 | End: 2024-04-30

## 2024-04-26 RX ORDER — SENNOSIDES 8.6 MG
17.2 TABLET ORAL NIGHTLY PRN
Status: DISCONTINUED | OUTPATIENT
Start: 2024-04-26 | End: 2024-04-30

## 2024-04-26 RX ORDER — ENEMA 19; 7 G/133ML; G/133ML
1 ENEMA RECTAL ONCE AS NEEDED
Status: DISCONTINUED | OUTPATIENT
Start: 2024-04-26 | End: 2024-04-30

## 2024-04-26 RX ORDER — NICOTINE POLACRILEX 4 MG
30 LOZENGE BUCCAL
Status: DISCONTINUED | OUTPATIENT
Start: 2024-04-26 | End: 2024-04-30

## 2024-04-26 RX ORDER — SODIUM CHLORIDE 9 MG/ML
INJECTION, SOLUTION INTRAVENOUS CONTINUOUS
Status: DISCONTINUED | OUTPATIENT
Start: 2024-04-26 | End: 2024-04-29

## 2024-04-26 NOTE — CM/SW NOTE
SW noted that patient is current with [facility] for Home RN and PT services. SW placed NAKUL orders for MD salgado.     SW will continue to follow for plan of care changes and remain available for any additional DC needs or concerns.     Lisa Arriaza MSW, LSW  Discharge Planner   [phone]

## 2024-04-26 NOTE — ED QUICK NOTES
Rounding Completed    Plan of Care reviewed. Waiting for Imaging.  Elimination needs assessed.  Provided comfort measures.    Pt BP is soft, pt is receiving 2nd Liter fluid bolus.

## 2024-04-26 NOTE — CONSULTS
[facility]  Department of Urology   Consultation Note    Renato Hall Patient Status:  Inpatient    1950 MRN [id number]   Location [facility] 4NW-A Attending Vianey Otero, *   Hosp Day # 0 PCP Deyanira Resendez MD     Reason for Consultation:  Left hydronephrosis    History of Present Illness:  Renato Hall is a a(n) 73 year old male  with mmp including but not limited to metastatic gastric neuroendocrine cancer, HTN/HL, CM, DMII, gout, slurred speech and 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      Abdominal/pelvic CT scan with contrast 24:  Impression   CONCLUSION:       1. Progression of malignancy, now with multiple hepatic metastatic lesions, and and infiltrating spreading malignant appearing process in the upper abdomen including gastroduodenal region and pancreas, with stranding and nodularity in the upper abdomen,  extending downward along the left retroperitoneum para renal fascia and into the para-aortic and aortocaval retroperitoneum where there is lymphadenopathy.     2. Developing splenic infarcts, with compromise of the left splenic vein and splenic artery by tumor.     3. Left hydronephrosis, probably secondary to tumor implants around the proximal left ureter, with demonstration of enhancing soft tissue in this region, and dilation of the left ureter proximal to the lesion.     4. Small amount of likely malignant free fluid in the upper abdomen and trace amount of pelvis, but no large or drainable ascites.     5. Sclerotic metastatic bone lesions.       6. . No sign of bowel obstruction, or free air.  Small pleural effusions.  Small pericardial effusion.  Other findings as above.     Urology was consulted.    No flank pain  No dysuria or gross hematuria  Voiding ok    No UTI/stones      History:  Past Medical History:    Abnormal screening CT of heart    calcium score 6    Abnormal  screening CT of heart    calcium score of 18    Abnormal screening CT of heart    calcium score 53    Adenomatous colon polyp    Allergic rhinitis due to pollen    BPH (benign prostatic hyperplasia)    Cancer (HCC)    Neuroendocrine CA/Pancreatic    Cardiomyopathy (HCC)    2D Echo 9/22/23    COVID    No hospitalization. Harsh coughing    Degeneration of cervical intervertebral disc    Diverticula of colon    Essential hypertension, benign    GOUT    High blood pressure    High cholesterol    History of blood transfusion    No reaction    Hypertrophic cardiomyopathy (HCC)    Personal history of antineoplastic chemotherapy    Last treatment    Pure hypercholesterolemia    Tinnitus, bilateral    Type II or unspecified type diabetes mellitus without mention of complication, not stated as uncontrolled    Visual impairment    Glasses     Past Surgical History:   Procedure Laterality Date    Cholecystectomy  06/21/1988    Colonoscopy  04/21/2008    tiny polyps    Colonoscopy  08/27/2014    Procedure: COLONOSCOPY;  Surgeon: Liang Quevedo MD;  Location:  ENDOSCOPY    Colonoscopy N/A 09/20/2017    Procedure: COLONOSCOPY;  Surgeon: Liang Quevedo MD;  Location:  ENDOSCOPY    Colonoscopy      Peripheral vascular screening historical conv Bilateral 05/22/2017    mild carotid narrowing, PAD screen    Upper gi endoscopy,exam      Vascular lab - dmg Bilateral 11/01/2011    PAD screening normal     Family History   Problem Relation Age of Onset    Arthritis Father         TKA    Hypertension Father     Diabetes Father     Obesity Father     Cancer Mother 70        colon, ?uterine    Genito-Urinary Disorder Mother         hysterectomy    Substance Abuse Son     Asthma Son     High Cholesterol Son     Gastro-Intestinal Disorder Brother         colon polyps, GERD    Hypertension Brother     Neurological Disorder Daughter         MS    Diabetes Daughter     Hypertension Sister     Diabetes Sister     Lipids Sister        reports that he quit smoking about 38 years ago. His smoking use included cigarettes. He started smoking about 55 years ago. He has a 17 pack-year smoking history. He has never used smokeless tobacco. He reports that he does not drink alcohol and does not use drugs.    Allergies:  Allergies   Allergen Reactions    Amlodipine SWELLING     Ankle swelling on amlodipine.       Medications:    Current Facility-Administered Medications:     sodium chloride 0.9% infusion, , Intravenous, Continuous    ondansetron (Zofran) 4 MG/2ML injection 4 mg, 4 mg, Intravenous, Q6H PRN    metoclopramide (Reglan) 5 mg/mL injection 10 mg, 10 mg, Intravenous, Q8H PRN    polyethylene glycol (PEG 3350) (Miralax) 17 g oral packet 17 g, 17 g, Oral, Daily PRN    sennosides (Senokot) tab 17.2 mg, 17.2 mg, Oral, Nightly PRN    bisacodyl (Dulcolax) 10 MG rectal suppository 10 mg, 10 mg, Rectal, Daily PRN    fleet enema (Fleet) 7-19 GM/118ML rectal enema 133 mL, 1 enema, Rectal, Once PRN    pantoprazole (Protonix) 40 mg in sodium chloride 0.9% PF 10 mL IV push, 40 mg, Intravenous, Q12H    piperacillin-tazobactam (Zosyn) 3.375 g in dextrose 5% 100 mL IVPB-ADDV, 3.375 g, Intravenous, Q8H    glucose (Dex4) 15 GM/59ML oral liquid 15 g, 15 g, Oral, Q15 Min PRN **OR** glucose (Glutose) 40% oral gel 15 g, 15 g, Oral, Q15 Min PRN **OR** glucose-vitamin C (Dex-4) chewable tab 4 tablet, 4 tablet, Oral, Q15 Min PRN **OR** dextrose 50% injection 50 mL, 50 mL, Intravenous, Q15 Min PRN **OR** glucose (Dex4) 15 GM/59ML oral liquid 30 g, 30 g, Oral, Q15 Min PRN **OR** glucose (Glutose) 40% oral gel 30 g, 30 g, Oral, Q15 Min PRN **OR** glucose-vitamin C (Dex-4) chewable tab 8 tablet, 8 tablet, Oral, Q15 Min PRN    insulin aspart (NovoLOG) 100 Units/mL FlexPen 1-5 Units, 1-5 Units, Subcutaneous, TID AC and HS    Review of Systems:  Pertinent items are noted in HPI.    Physical Exam:  /62   Pulse 79   Temp 98.2 °F (36.8 °C) (Oral)   Resp 14   Wt 178 lb  (80.7 kg)   SpO2 98%   BMI 27.88 kg/m²   GENERAL: the patient is resting in bed in no acute distress.    HEENT: Unremarkable.  NECK: Supple.  LUNGS: non-labored respirations.    ABDOMEN:  The abdomen is soft and nontender without rebound or guarding.  BACK: Without CVA tenderness.      Laboratory Data:  Lab Results   Component Value Date    WBC 16.1 04/25/2024    HGB 14.3 04/25/2024    HCT 41.8 04/25/2024    .0 04/25/2024    CREATSERUM 1.38 04/25/2024    BUN 64 04/25/2024     04/25/2024    K 4.6 04/25/2024    CL 99 04/25/2024    CO2 20.0 04/25/2024     04/25/2024    CA 8.5 04/25/2024    ALB 2.6 04/25/2024    ALKPHO 107 04/25/2024    BILT 0.9 04/25/2024    TP 5.7 04/25/2024    AST 38 04/25/2024    ALT 31 04/25/2024    PTT 28.4 04/25/2024    INR 1.11 04/25/2024    PTP 14.4 04/25/2024    PGLU 170 04/26/2024     UA 4/25/24 was cath specimen: 6-10 WBC/hpf, >10 RBC/hpf, no bacteria, few squamous epithelial cells     Imaging:    CT ABDOMEN+PELVIS(CONTRAST ONLY)(CPT=74177)    Result Date: 4/25/2024  CONCLUSION:   1. Progression of malignancy, now with multiple hepatic metastatic lesions, and and infiltrating spreading malignant appearing process in the upper abdomen including gastroduodenal region and pancreas, with stranding and nodularity in the upper abdomen, extending downward along the left retroperitoneum para renal fascia and into the para-aortic and aortocaval retroperitoneum where there is lymphadenopathy.  2. Developing splenic infarcts, with compromise of the left splenic vein and splenic artery by tumor.  3. Left hydronephrosis, probably secondary to tumor implants around the proximal left ureter, with demonstration of enhancing soft tissue in this region, and dilation of the left ureter proximal to the lesion.  4. Small amount of likely malignant free fluid in the upper abdomen and trace amount of pelvis, but no large or drainable ascites.  5. Sclerotic metastatic bone lesions.   6. . No  sign of bowel obstruction, or free air.  Small pleural effusions.  Small pericardial effusion.  Other findings as above.   LOCATION:  [facility]   Dictated by (CST): Handy Dhaliwal MD on 4/25/2024 at 10:18 PM     Finalized by (CST): Handy Dhaliwal MD on 4/25/2024 at 10:31 PM       Impression:  Patient Active Problem List   Diagnosis    Essential hypertension, benign    Pure hypercholesterolemia    Hyperuricemia    Allergic rhinitis due to pollen    Sensorineural hearing loss, bilateral    Diabetes mellitus type II, non insulin dependent (HCC)    Hypertrophic cardiomyopathy (HCC)    Nasopalatine cyst    Stage 3a chronic kidney disease (HCC)    Rotator cuff tendinitis, right    Hypercholesterolemia with hypertriglyceridemia    BMI 40.0-44.9, adult (HCC)    Gastric mass    Rectal bleeding    Leukocytosis, unspecified type    Anemia, unspecified type    Hemoglobin drop    MDS (myelodysplastic syndrome) (HCC)    Hyponatremia    Anemia    Azotemia    Hyperglycemia    Gastrointestinal hemorrhage, unspecified gastrointestinal hemorrhage type    Thrombocytopenia (HCC)    Slurred speech    Gait instability    Melanotic stools    ROSCOE (acute kidney injury) (HCC)    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  Reviewed risks, benefits, alternatives, and complications of the procedure including but not limited to risk of anesthesia, bladder/ureteral injury, use of nephrostomy tube, bleeding, infection, and ureteral stent related symptoms with patient/wife who understands. Informed patient/wife the ureteral stent is not a permanent implant and would eventually need to be  removed or exchanged within 3 months pending clinical course.  Patient/wife understand.    Follow renal function  Monitor for flank pain    Oncology consulted.      Thank you for allowing me to participate in the care of your patient.    Shweta Lizarraga PA-C  [facility]  Department of Urology  4/26/2024  10:04 AM

## 2024-04-26 NOTE — ED QUICK NOTES
Orders for admission, patient is aware of plan and ready to go upstairs. Any questions, please call ED RN Vijay at extension [phone].     Patient Covid vaccination status: Fully vaccinated     COVID Test Ordered in ED: None    COVID Suspicion at Admission: N/A    Running Infusions:      Mental Status/LOC at time of transport: Ax4    Other pertinent information:   CIWA score: N/A   NIH score:  N/A

## 2024-04-26 NOTE — PROGRESS NOTES
New admission, pt A&Ox4 on room air, horse voice and slurred speech baseline per wife. Bed rest but able to turn. NPO, fall precautions in place. Pt gets radiation today 4/26 will be his 10th round/ last round not sure if getting today or not. Call light within reach, frequent checks made, needs met.

## 2024-04-26 NOTE — HOME CARE LIAISON
Patient is current with [facility] for RN AND PT services. Updated CM NAKUL order will be needed at [facility]. Contact information added to the AVS.

## 2024-04-26 NOTE — H&P
[facility] History and Physical     Renato Hall Patient Status:  Inpatient    1950 MRN [id number]   [facility] 4NW-A Attending Vianey Otero, *   Hosp Day # 0 PCP Deyanira Resendez MD     Chief Complaint:  melena    Subjective:    Renato Hall is a 73 year old male with mmp including but not limited to metastatic gastric neuroendocrine cancer, HTN/HL, CM, DMII, gout, slurred speech and 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  Had in past-- on 3/25/24, had EGD showing ulcerated gastric cardia mass with clot and friable mucosa with 2 endoclips placed. When seen, pt generally weak, LH. No pain. No cp/sob/n/v/f/c. No new abdominal pain/dysuria, urgency. Wife at bedside.    History/Other:        History/Other:    Past Medical History:  Past Medical History:    Abnormal screening CT of heart    calcium score 6    Abnormal screening CT of heart    calcium score of 18    Abnormal screening CT of heart    calcium score 53    Adenomatous colon polyp    Allergic rhinitis due to pollen    BPH (benign prostatic hyperplasia)    Cancer (HCC)    Neuroendocrine CA/Pancreatic    Cardiomyopathy (HCC)    2D Echo 23    COVID    No hospitalization. Harsh coughing    Degeneration of cervical intervertebral disc    Diverticula of colon    Essential hypertension, benign    GOUT    High blood pressure    High cholesterol    History of blood transfusion    No reaction    Hypertrophic cardiomyopathy (HCC)    Personal history of antineoplastic chemotherapy    Last treatment    Pure hypercholesterolemia    Tinnitus, bilateral    Type II or unspecified type diabetes mellitus without mention of complication, not stated as uncontrolled    Visual impairment    Glasses     Past Surgical History:   Past Surgical History:   Procedure Laterality Date    Cholecystectomy  1988    Colonoscopy  2008    tiny polyps     Colonoscopy  08/27/2014    Procedure: COLONOSCOPY;  Surgeon: Liang Quevedo MD;  Location:  ENDOSCOPY    Colonoscopy N/A 09/20/2017    Procedure: COLONOSCOPY;  Surgeon: Liang Quevedo MD;  Location:  ENDOSCOPY    Colonoscopy      Peripheral vascular screening historical conv Bilateral 05/22/2017    mild carotid narrowing, PAD screen    Upper gi endoscopy,exam      Vascular lab - dmg Bilateral 11/01/2011    PAD screening normal      Family History:   Family History   Problem Relation Age of Onset    Arthritis Father         TKA    Hypertension Father     Diabetes Father     Obesity Father     Cancer Mother 70        colon, ?uterine    Genito-Urinary Disorder Mother         hysterectomy    Substance Abuse Son     Asthma Son     High Cholesterol Son     Gastro-Intestinal Disorder Brother         colon polyps, GERD    Hypertension Brother     Neurological Disorder Daughter         MS    Diabetes Daughter     Hypertension Sister     Diabetes Sister     Lipids Sister      Social History:    reports that he quit smoking about 38 years ago. His smoking use included cigarettes. He started smoking about 55 years ago. He has a 17 pack-year smoking history. He has never used smokeless tobacco. He reports that he does not drink alcohol and does not use drugs.       Allergies:   Allergies   Allergen Reactions    Amlodipine SWELLING     Ankle swelling on amlodipine.       Medications:    Current Facility-Administered Medications on File Prior to Encounter   Medication Dose Route Frequency Provider Last Rate Last Admin    [COMPLETED] magnesium oxide (Mag-Ox) tab 400 mg  400 mg Oral Once Jose Matt MD   400 mg at 04/07/24 1212    [COMPLETED] potassium chloride (K-Dur) tab 40 mEq  40 mEq Oral Q4H Jose Matt MD   40 mEq at 04/06/24 0949    [COMPLETED] magnesium oxide (Mag-Ox) tab 800 mg  800 mg Oral Once Jose Matt MD   800 mg at 04/06/24 0657    [COMPLETED] dexamethasone (Decadron) tab 4 mg  4 mg Oral 2  times per day Andrew Rivers MD   4 mg at 24 2124    [COMPLETED] gadoterate meglumine (Dotarem) 10 MMOL/20ML injection 20 mL  20 mL Intravenous ONCE PRN Jose Matt MD   18 mL at 24 1353    [COMPLETED] potassium chloride (K-Dur) tab 40 mEq  40 mEq Oral Once Guille Davis MD   40 mEq at 24 0806    [COMPLETED] sodium chloride 0.9% infusion   Intravenous Once Shawna Ford DO 10 mL/hr at 24 0615 New Bag at 24 0615    [COMPLETED] sodium chloride 0.9% infusion   Intravenous Once Bacilio Ornelas MD 10 mL/hr at 24 1000 New Bag at 24 1000    [COMPLETED] sodium chloride 0.9 % IV bolus 500 mL  500 mL Intravenous Once Abhi Monaco MD   Stopped at 24 1430    [COMPLETED] pantoprazole (Protonix) 40 mg in sodium chloride 0.9% PF 10 mL IV push  40 mg Intravenous Once Abhi Monaco MD   40 mg at 24 1344    [] sodium chloride 0.9% infusion   Intravenous Continuous Abhi Monaco MD        [COMPLETED] sodium chloride 0.9% infusion   Intravenous Once Vianey Otero MD 10 mL/hr at 24 1106 New Bag at 24 1106    [COMPLETED] heparin (Porcine) 100 Units/mL lock flush 500 Units  5 mL Intravenous Once Aldo Padilla MD   500 Units at 24 1356     Current Outpatient Medications on File Prior to Encounter   Medication Sig Dispense Refill    prochlorperazine (COMPAZINE) 10 mg tablet Take 1 tablet (10 mg total) by mouth every 6 (six) hours as needed for Nausea.      pantoprazole 40 MG Oral Tab EC Take 1 tablet (40 mg total) by mouth 2 (two) times daily before meals. 60 tablet 0    sucralfate 1 g Oral Tab Take 1 tablet (1 g total) by mouth 3 (three) times daily before meals. 90 tablet 0    ondansetron 4 MG Oral Tablet Dispersible Take 1 tablet (4 mg total) by mouth every 8 (eight) hours as needed for Nausea.      allopurinol 300 MG Oral Tab Take 1 tablet (300 mg total) by mouth daily. 90 tablet 1    metFORMIN HCl ER (GLUCOPHAGE XR) 500 MG Oral  Tablet 24 Hr Take 1 tablet (500 mg total) by mouth daily with breakfast. 90 tablet 1    carvedilol 25 MG Oral Tab Take 1 tablet (25 mg total) by mouth 2 (two) times daily with meals. 180 tablet 1    Microlet Lancets Does not apply Misc Test daily 100 each 3    Glucose Blood (CONTOUR NEXT TEST) In Vitro Strip Test blood sugar daily 100 each 1    Glucose Blood In Vitro Strip ONE EVERY  strip 3    [] dexamethasone (DECADRON) 4 MG tablet Take 1 tablet (4 mg total) by mouth every 12 (twelve) hours for 3 days. 6 tablet 0    pantoprazole 40 MG Oral Tab EC Take 1 tablet (40 mg total) by mouth 2 (two) times daily before meals. (Patient not taking: Reported on 2024) 60 tablet 0    rosuvastatin 10 MG Oral Tab Take 1 tablet (10 mg total) by mouth daily. (Patient not taking: Reported on 2024) 90 tablet 3    Fenofibrate 54 MG Oral Tab Take 1 tablet (54 mg total) by mouth daily. with food (Patient not taking: Reported on 2024) 90 tablet 1       Review of Systems:   A comprehensive 14 point review of systems was completed.    Pertinent positives and negatives noted in the HPI.    Objective:     /62   Pulse 79   Temp 98.2 °F (36.8 °C) (Oral)   Resp 14   Wt 178 lb (80.7 kg)   SpO2 98%   BMI 27.88 kg/m²      General:  Alert, NAD, appears stated age, no accessory m use, chronically ill appearing, no conversational dyspnea   Head:  Normocephalic, without obvious abnormality, atraumatic.   Eyes:  Sclera anicteric,  EOMs intact. Lids wnl.    Ears, nose, throat:  external ears and nose within normal limits, hearing intact       Neck: Supple, symmetrical   Lungs:   Clear to auscultation bilaterally. ok effort   Chest wall:  No tenderness or deformity.   Heart:  Regular rate and rhythm, S1, S2 normal,no LE edema   Abdomen:   Soft, non-tender. Bowel sounds normal. . Non distended, no peritoneal signs   Extremities: Extremities normal, atraumatic, no edema.   Skin: Skin color, texture, turgor normal. No  visible rashes     Neurologic:  Psychiatric: Low hoarse voice  appropriate affect,  answering questions ok       Results:         CBC/Chem  Recent Labs   Lab 04/25/24 1818   WBC 16.1*   HGB 14.3   MCV 89.1   .0   INR 1.11       Recent Labs   Lab 04/25/24 1818   *   K 4.6   CL 99   CO2 20.0*   BUN 64*   CREATSERUM 1.38*   *   CA 8.5       Recent Labs   Lab 04/25/24 1818   ALT 31   AST 38*   ALB 2.6*          Additional Diagnostics: ECG: NSR       Radiology: CT ABDOMEN+PELVIS(CONTRAST ONLY)(CPT=74177)    Result Date: 4/25/2024            CONCLUSION:   1. Progression of malignancy, now with multiple hepatic metastatic lesions, and and infiltrating spreading malignant appearing process in the upper abdomen including gastroduodenal region and pancreas, with stranding and nodularity in the upper abdomen, extending downward along the left retroperitoneum para renal fascia and into the para-aortic and aortocaval retroperitoneum where there is lymphadenopathy.  2. Developing splenic infarcts, with compromise of the left splenic vein and splenic artery by tumor.  3. Left hydronephrosis, probably secondary to tumor implants around the proximal left ureter, with demonstration of enhancing soft tissue in this region, and dilation of the left ureter proximal to the lesion.  4. Small amount of likely malignant free fluid in the upper abdomen and trace amount of pelvis, but no large or drainable ascites.  5. Sclerotic metastatic bone lesions.   6. . No sign of bowel obstruction, or free air.  Small pleural effusions.  Small pericardial effusion.  Other findings as above.   LOCATION:  [place]   Dictated by (CST): Handy Dhaliwal MD on 4/25/2024 at 10:18 PM     Finalized by (CST): Handy Dhaliwal MD on 4/25/2024 at 10:31 PM           COVID-19 Lab Results    COVID-19  Lab Results   Component Value Date    COVID19 Not Detected 03/24/2024    COVID19 Not Detected 04/07/2023    COVID19 Not Detected 02/24/2023         Assessment & Plan:     73 year old male with mmp including but not limited to metastatic gastric neuroendocrine cancer, HTN/HL, CM, DMII, gout, slurred speech and 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     **melena, diarrhea in setting of   **recent EGD with ulcerated gastric cardia mass with clot and friable mucosa with 2 endoclips placed 3/25/24 per GI. 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 Urinating ok. UA contaminated.  -urology consult, appreciate    **hyponatremia- on gentle IVF, monitor    **acute kidney injury, metabolic acidosis, trial ivf  Stable chronic illnesses:  HTN/HL, CM - hold po meds for now  DMII- iss, accuchecks  Gout    PPx-scds for now    Dw pt/wife/RN Janell    Thank You,  Vianey Otero MD    [facility] Hospitalist  Internal Medicine  Answering Service number: [phone]    4/26/2024    Outpatient records or previous hospital records reviewed.     Further recommendations pending patient's clinical course.  DM hospitalist to continue to follow patient while in house    Patient and/or patient's family given opportunity to ask questions and note understanding and agreeing with therapeutic plan as outlined    Supplementary Documentation:

## 2024-04-26 NOTE — CONSULTS
[facility] IP Report of Consultation Gastroenterology    4/26/2024    Renato Harringtonnohemi  male   Otto Gore DO     [id number]  5/30/1950 Primary Care Physician  Deyanira Resendez MD     Reason for Consultation: Diarrhea and black stools     HPI: Patient is 73M w/ known large gastric mass per EGD 2/2023 + subsequent EUS/FNA confirming poorly-differentiated NET w/ invasion into the pancreas 2/27/23, local and distant LAD on staging studies. Followed by [facility] (Dr. Henning), s/p chemotx w/ concerns for symptomatic progression 11/2023 (further chemotx at that point). Presenting to ED w/ incr weakness + fatigue + dark stools over the last 1-2 days. Hemoglobin is now stable at 14.3. No nausea or vomiting. Reports black tarry stools. Has diarrhea. Stool for C diff is pending       PROBLEM LIST:     Patient Active Problem List   Diagnosis    Essential hypertension, benign    Pure hypercholesterolemia    Hyperuricemia    Allergic rhinitis due to pollen    Sensorineural hearing loss, bilateral    Diabetes mellitus type II, non insulin dependent (HCC)    Hypertrophic cardiomyopathy (HCC)    Nasopalatine cyst    Stage 3a chronic kidney disease (HCC)    Rotator cuff tendinitis, right    Hypercholesterolemia with hypertriglyceridemia    BMI 40.0-44.9, adult (HCC)    Gastric mass    Rectal bleeding    Leukocytosis, unspecified type    Anemia, unspecified type    Hemoglobin drop    MDS (myelodysplastic syndrome) (HCC)    Hyponatremia    Anemia    Azotemia    Hyperglycemia    Gastrointestinal hemorrhage, unspecified gastrointestinal hemorrhage type    Thrombocytopenia (HCC)    Slurred speech    Gait instability    Melanotic stools    ROSCOE (acute kidney injury) (HCC)    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 Harsh coughing    Degeneration of cervical intervertebral disc    Diverticula of colon    Essential hypertension, benign    GOUT    High blood pressure    High cholesterol    History of blood transfusion    No reaction    Hypertrophic cardiomyopathy (HCC)    Personal history of antineoplastic chemotherapy    Last treatment    Pure hypercholesterolemia    Tinnitus, bilateral    Type II or unspecified type diabetes mellitus without mention of complication, not stated as uncontrolled    Visual impairment    Glasses      Past Surgical History:   Procedure Laterality Date    Cholecystectomy  06/21/1988    Colonoscopy  04/21/2008    tiny polyps    Colonoscopy  08/27/2014    Procedure: COLONOSCOPY;  Surgeon: Liang Quevedo MD;  Location:  ENDOSCOPY    Colonoscopy N/A 09/20/2017    Procedure: COLONOSCOPY;  Surgeon: Liang Quevedo MD;  Location:  ENDOSCOPY    Colonoscopy      Peripheral vascular screening historical conv Bilateral 05/22/2017    mild carotid narrowing, PAD screen    Upper gi endoscopy,exam      Vascular lab - dmg Bilateral 11/01/2011    PAD screening normal      Family History   Problem Relation Age of Onset    Arthritis Father         TKA    Hypertension Father     Diabetes Father     Obesity Father     Cancer Mother 70        colon, ?uterine    Genito-Urinary Disorder Mother         hysterectomy    Substance Abuse Son     Asthma Son     High Cholesterol Son     Gastro-Intestinal Disorder Brother         colon polyps, GERD    Hypertension Brother     Neurological Disorder Daughter         MS    Diabetes Daughter     Hypertension Sister     Diabetes Sister     Lipids Sister       Social History     Socioeconomic History    Marital status:      Spouse name: Dayan    Quinn  of children: 2    Years of education: 12   Occupational History    Occupation:      Comment: self employed   Tobacco Use    Smoking status: Former     Current packs/day: 0.00     Average packs/day: 1 pack/day for 17.0 years (17.0 ttl pk-yrs)     Types: Cigarettes     Start date: 1969     Quit date: 1986     Years since quittin.3    Smokeless tobacco: Never   Vaping Use    Vaping status: Never Used   Substance and Sexual Activity    Alcohol use: No     Alcohol/week: 0.0 standard drinks of alcohol    Drug use: No    Sexual activity: Yes     Partners: Female   Other Topics Concern     Service Yes     Comment: [employer]     Blood Transfusions No    Caffeine Concern Yes     Comment: coffee 1 cup a day    Occupational Exposure Yes     Comment: asbestos    Hobby Hazards No    Sleep Concern No    Stress Concern No    Weight Concern Yes    Special Diet No    Back Care No    Exercise Yes     Comment: walk    Bike Helmet No    Seat Belt Yes    Self-Exams No   Social History Narrative    Lives with wife, and son    Enjoys [employer]     Social Determinants of Health     Food Insecurity: No Food Insecurity (2024)    Food Insecurity     Food Insecurity: Never true   Transportation Needs: No Transportation Needs (2024)    Transportation Needs     Lack of Transportation: No   Housing Stability: Low Risk  (2024)    Housing Stability     Housing Instability: No        Allergies;  Allergies   Allergen Reactions    Amlodipine SWELLING     Ankle swelling on amlodipine.        Medications:    Current Facility-Administered Medications:     sodium chloride 0.9% infusion, , Intravenous, Continuous    ondansetron (Zofran) 4 MG/2ML injection 4 mg, 4 mg, Intravenous, Q6H PRN    metoclopramide (Reglan) 5 mg/mL injection 10 mg, 10 mg, Intravenous, Q8H PRN    polyethylene glycol (PEG 3350) (Miralax) 17 g oral packet 17 g, 17 g, Oral, Daily PRN    sennosides (Senokot) tab 17.2 mg, 17.2 mg, Oral, Nightly  PRN    bisacodyl (Dulcolax) 10 MG rectal suppository 10 mg, 10 mg, Rectal, Daily PRN    fleet enema (Fleet) 7-19 GM/118ML rectal enema 133 mL, 1 enema, Rectal, Once PRN    pantoprazole (Protonix) 40 mg in sodium chloride 0.9% PF 10 mL IV push, 40 mg, Intravenous, Q12H    piperacillin-tazobactam (Zosyn) 3.375 g in dextrose 5% 100 mL IVPB-ADDV, 3.375 g, Intravenous, Q8H    glucose (Dex4) 15 GM/59ML oral liquid 15 g, 15 g, Oral, Q15 Min PRN **OR** glucose (Glutose) 40% oral gel 15 g, 15 g, Oral, Q15 Min PRN **OR** glucose-vitamin C (Dex-4) chewable tab 4 tablet, 4 tablet, Oral, Q15 Min PRN **OR** dextrose 50% injection 50 mL, 50 mL, Intravenous, Q15 Min PRN **OR** glucose (Dex4) 15 GM/59ML oral liquid 30 g, 30 g, Oral, Q15 Min PRN **OR** glucose (Glutose) 40% oral gel 30 g, 30 g, Oral, Q15 Min PRN **OR** glucose-vitamin C (Dex-4) chewable tab 8 tablet, 8 tablet, Oral, Q15 Min PRN    insulin aspart (NovoLOG) 100 Units/mL FlexPen 1-5 Units, 1-5 Units, Subcutaneous, TID AC and HS     REVIEW OF SYSTEMS:   GENERAL: well developed, well nourished, in no apparent distress  HEENT: normocephalic; normal nose, pharynx and TM's  EYES: PERRLA, EOMI, sclera anicteric, conjunctiva normal; fundi normal  NECK: supple, FROM, no nodes, no JVD, no thyromegaly, no carotid bruits  RESPIRATORY: clear to percussion and auscultation  CARDIOVASCULAR: S1, S2 normal, RRR; no S3, no S4; no click; murmur negative  ABDOMEN: normal, active bowel sounds, no masses, HSM or tenderness  RECTAL: ---  EXTREMITIES: no cyanosis, clubbing or edema, peripheral pulses intact  PSYCHIATRIC: alert, oriented times 3      EXAM:   BP (!) 85/56   Pulse 75   Temp 98.2 °F (36.8 °C) (Oral)   Resp 14   Wt 178 lb (80.7 kg)   SpO2 98%   BMI 27.88 kg/m²  Body mass index is 27.88 kg/m².  GENERAL: Well developed, well nourished, in no obvious distress.   ABDOMEN: Bowel sounds normoactive. Soft, no organomegaly or masses appreciated. Nontender.   EXTREMITIES: Without  cyanosis. No peripheral edema appreciated.   RECTAL: Deferred.   NEURO: Motor and Gait grossly intact. Alert and Oriented x 3.    LAB/IMAGING RESULTS:     Lab Results   Component Value Date    WBC 16.1 04/25/2024    HGB 14.3 04/25/2024    HCT 41.8 04/25/2024    .0 04/25/2024     [unfilled]  Lab Results   Component Value Date    WBC 16.1 04/25/2024    HGB 14.3 04/25/2024    HCT 41.8 04/25/2024    .0 04/25/2024    CREATSERUM 1.38 04/25/2024    BUN 64 04/25/2024     04/25/2024    K 4.6 04/25/2024    CL 99 04/25/2024    CO2 20.0 04/25/2024     04/25/2024    CA 8.5 04/25/2024    ALB 2.6 04/25/2024    ALKPHO 107 04/25/2024    BILT 0.9 04/25/2024    TP 5.7 04/25/2024    AST 38 04/25/2024    ALT 31 04/25/2024    PTT 28.4 04/25/2024    INR 1.11 04/25/2024    PTP 14.4 04/25/2024    PGLU 197 04/26/2024         ASSESSMENT AND PLAN:   Renato Hall is a(n) 73 year old male. Pt with gastric cancer s/p bleed.     EGD 3/25/2024: Ulcerated gastric cardia mass with clot and friable mucosa. No site amenable to endoscopic therapy. Endoclips x 2 placed     CT ABDOMEN AND PELVIS 4/25/2024  1. Progression of malignancy, now with multiple hepatic metastatic lesions, and and infiltrating spreading malignant appearing process in the upper abdomen including gastroduodenal region and pancreas, with stranding and nodularity in the upper abdomen,   extending downward along the left retroperitoneum para renal fascia and into the para-aortic and aortocaval retroperitoneum where there is lymphadenopathy.   2. Developing splenic infarcts, with compromise of the left splenic vein and splenic artery by tumor.   3. Left hydronephrosis, probably secondary to tumor implants around the proximal left ureter, with demonstration of enhancing soft tissue in this region, and dilation of the left ureter proximal to the lesion.   4. Small amount of likely malignant free fluid in the upper abdomen and trace amount of pelvis, but no  large or drainable ascites.   5. Sclerotic metastatic bone lesions.    6. . No sign of bowel obstruction, or free air.  Small pleural effusions.  Small pericardial effusion.  Other findings as above.     PLAN:  - Pantoprazole 40 mg twice daily   - Avoid NSAID's   - No indication for EGD at this time. Hemoglobin is stable at 14.3  - Resume low fat diet   - Check stool for C diff infection. No BM's yet in the last 16 hrs       The patient indicates understanding of these issues and agrees to the plan.  Otto Gore DO  4/26/2024  11:50 AM

## 2024-04-26 NOTE — DISCHARGE INSTRUCTIONS
Sometimes managing your health at home requires assistance.  The [facility] team has recognized your preference to use [facility].  They can be reached by phone at [phone].  The fax number for your reference is [phone].

## 2024-04-26 NOTE — SLP NOTE
ADULT SWALLOWING EVALUATION    ASSESSMENT    ASSESSMENT/OVERALL IMPRESSION:  Patient is a 74 y/o male admitted with GIB and PMHx significant for metastatic neuroendocrine tumor. SLP order received to evaluate oropharyngeal swallow d/t patient reporting difficulty swallowing at home. Patient received alert in bed. He reported coughing/choking when drinking liquids ~1x/day over the past month. He also reported occasional globus sensation with solids. Patient denied any recent pneumonia or respiratory compromise.    Patient presented with largely intact oropharyngeal swallow. Bolus acceptance was adequate without evidence of anterior bolus loss. Mastication and AP bolus transit were thorough and efficient without evidence of oral residue. Pharyngeal swallow initiation appeared timely and hyolaryngeal excursion was adequate per palpation.  Patient with delayed cough x1 following initial trial of thin liquids. No further s/s of aspiration observed throughout.    Patient appears safe to continue a regular diet and thin liquids. He may benefit from further evaluation via VFSS given his reports of frequent coughing/choking. Discussed with patient who was unsure if he is willing to participate with VFSS and wishes I speak to his wife when she returns. Will attempt to follow up at tie when wife is available to determine if agreeable to VFSS.    Addendum  Met with patient and his wife at bedside. Education provided re: rationale for VFSS. They shared that HH SLP is planned to visit next week. VFSS will help guide treatment for HH SLP. Patient and his wife agreeable to VFSS. Will plan to complete 4/27 with further recommendations pending exam. Discussed with RN.         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 Harsh coughing    Degeneration of cervical intervertebral disc    Diverticula of colon    Essential hypertension, benign    GOUT    High blood pressure    High cholesterol    History of blood transfusion    No reaction    Hypertrophic cardiomyopathy (HCC)    Personal history of antineoplastic chemotherapy    Last treatment    Pure hypercholesterolemia    Tinnitus, bilateral    Type II or unspecified type diabetes mellitus without mention of complication, not stated as uncontrolled    Visual impairment    Glasses       Prior Living Situation: Home with spouse  Diet Prior to Admission: Regular;Thin liquids  Precautions: Aspiration    Patient/Family Goals: none stated    SWALLOWING HISTORY  Current Diet Consistency: Regular;Thin liquids  Dysphagia History: as above  Imaging Results:   CT A+P 4/25/24  CONCLUSION:       1. Progression of malignancy, now with multiple hepatic metastatic lesions, and and infiltrating spreading malignant appearing process in the upper abdomen including gastroduodenal region and pancreas, with stranding and nodularity in the upper abdomen,   extending downward along the left retroperitoneum para renal fascia and  into the para-aortic and aortocaval retroperitoneum where there is lymphadenopathy.      2. Developing splenic infarcts, with compromise of the left splenic vein and splenic artery by tumor.      3. Left hydronephrosis, probably secondary to tumor implants around the proximal left ureter, with demonstration of enhancing soft tissue in this region, and dilation of the left ureter proximal to the lesion.      4. Small amount of likely malignant free fluid in the upper abdomen and trace amount of pelvis, but no large or drainable ascites.      5. Sclerotic metastatic bone lesions.       6. . No sign of bowel obstruction, or free air.  Small pleural effusions.  Small pericardial effusion.  Other findings as above.         LOCATION:  [facility]         Dictated by (CST): Handy Dhaliwal MD on 4/25/2024 at 10:18 PM       Finalized by (CST): Handy Dhaliwal MD on 4/25/2024 at 10:31 PM     SUBJECTIVE       OBJECTIVE   ORAL MOTOR EXAMINATION  Dentition: Functional  Symmetry: Within Functional Limits  Strength: Within Functional Limits     Range of Motion: Within Functional Limits       Voice Quality: Hoarse  Respiratory Status: Unlabored  Consistencies Trialed: Thin liquids;Nectar thick liquids;Puree;Hard solid  Method of Presentation: Self presentation  Patient Positioning: Upright;Midline    Oral Phase of Swallow: Within Functional Limits                      Pharyngeal Phase of Swallow: Within Functional Limits           (Please note: Silent aspiration cannot be evaluated clinically. Videofluoroscopic Swallow Study is required to rule-out silent aspiration.)    Esophageal Phase of Swallow: No complaints consistent with possible esophageal involvement  Comments: NA              GOALS  Goal #1 The patient will tolerate regular consistency and thin liquids without overt signs or symptoms of aspiration with 95 % accuracy over 1-2 session(s).  In Progress   Goal #2 The patient/family/caregiver will demonstrate understanding and  implementation of aspiration precautions and swallow strategies independently over 1-2 session(s).    In Progress   Goal #3 VFSS if agreeable  In Progress   Goal #4     Goal #5     Goal #6     Goal #7     Goal #8       FOLLOW UP  Treatment Plan/Recommendations: Aspiration precautions     Follow Up Needed (Documentation Required): Yes  SLP Follow-up Date: 04/27/24    Thank you for your referral.   If you have any questions, please contact Elier Mahoney SLP

## 2024-04-26 NOTE — DIETARY MALNUTRITION NOTE
[facility]   part of [facility]    NUTRITION ASSESSMENT    Pt meets severe malnutrition criteria at this time.    CRITERIA FOR MALNUTRITION DIAGNOSIS:  Criteria for severe malnutrition diagnosis: chronic illness related to wt loss greater than 7.5% in 3 months, energy intake less than 75% for greater than 1 month, body fat severe depletion, and muscle mass severe depletion      NUTRITION INTERVENTION:    RD nutrition Care Plan- See RD nutrition assessment for additional recommendations  Meal and Snacks - Monitor and encourage adequate PO intake.   Medical Food Supplements - Will evaluate need when diet is advanced. Rationale/use for oral supplements discussed.  Coordination of Nutrition Care - SLP consult prior to diet advancement.      PATIENT STATUS: 04/26/24 73 year old male admitted with GIB and recently diagnosed with metastatic neuroendocrine tumor. Pt and wife report he is not able to eat or drink much more than pudding and italian ice due to trouble swallowing. Pt met with speech today and they plan to do VFSS .  Pt reports trouble swallow began 1 month ago but has worsened over past week where he is able to take in very little.  Offered ONS but pt does not want at this time , he would like to wait until after the swallow study.  Pt with significant wt loss over the past few months and pt with severe muscle and fat depletion.        ANTHROPOMETRICS:  Ht:  172.7 cm (5'8\")  Wt: 80.7 kg (178 lb).   BMI: Body mass index is 27.88 kg/m².  IBW: 70 kg      WEIGHT HISTORY:   Weight loss: Yes, Severe Wt loss of 15 kg, 16%, over 4 months     Wt Readings from Last 10 Encounters:   04/26/24 80.7 kg (178 lb)   04/05/24 88.5 kg (195 lb)   03/24/24 88 kg (193 lb 14.4 oz)   02/23/24 90.7 kg (200 lb)   12/21/23 95.7 kg (211 lb)   04/07/23 96.6 kg (213 lb)   02/27/23 105.2 kg (232 lb)   03/14/22 117 kg (258 lb)   12/13/21 114.8 kg (253 lb)   09/13/21 110.2 kg (243 lb)        NUTRITION:  Diet:       Procedures    NPO     Low Fiber/Soft diet Low Fiber/Soft; Is Patient on Accuchecks? Yes      Food Allergies: No  Cultural/Ethnic/Yazidism Preferences Addressed: Yes    Percent Meals Eaten (last 3 days)       None            GI system review: swallowing dysfunction Last BM: PTA  Skin and wounds: none    NUTRITION RELATED PHYSICAL FINDINGS:     1. Body Fat/Muscle Mass: severe depletion body fat Orbital fat pad and Triceps and severe muscle depletion Temple region, Clavicle region, and Shoulder/Acromion process     2. Fluid Accumulation: none     NUTRITION PRESCRIPTION: 80.7kg (4/26)  Calories: 3926-2235 calories/day (25-30 kcal/kg)  Protein:  grams protein/day (1.2-1.5 grams protein per kg)  Fluid: ~1 ml/kcal or per MD discretion    NUTRITION DIAGNOSIS/PROBLEM:  Malnutrition related to physiological causes and inability to take or tolerate as evidenced by documented/reported insufficient oral intake, documented/reported unintentional weight loss, loss of fat mass, and loss of muscle mass      MONITOR AND EVALUATE/NUTRITION GOALS:  Weight stable within 1 to 2 lbs during admission - New  Return to PO intake or advance diet in 24-48 hrs - New  Start alternative nutrition in 24-48 hrs if diet is not able to advance- New      MEDICATIONS:  Reviewed    LABS:  Sodium 128, BUN 64, Creatine 1.38    Pt is at High nutrition risk  Ching Sanders RD, LDN  Clinical Nutrition

## 2024-04-27 ENCOUNTER — ANESTHESIA EVENT (OUTPATIENT)
Dept: ENDOSCOPY | Facility: HOSPITAL | Age: 74
End: 2024-04-27
Payer: MEDICARE

## 2024-04-27 ENCOUNTER — ANESTHESIA (OUTPATIENT)
Dept: ENDOSCOPY | Facility: HOSPITAL | Age: 74
End: 2024-04-27
Payer: MEDICARE

## 2024-04-27 LAB
ALBUMIN SERPL-MCNC: 2 G/DL (ref 3.4–5)
ALBUMIN/GLOB SERPL: 0.8 {RATIO} (ref 1–2)
ALP LIVER SERPL-CCNC: 86 U/L
ALT SERPL-CCNC: 24 U/L
ANION GAP SERPL CALC-SCNC: 8 MMOL/L (ref 0–18)
AST SERPL-CCNC: 23 U/L (ref 15–37)
BASOPHILS # BLD AUTO: 0.01 X10(3) UL (ref 0–0.2)
BASOPHILS NFR BLD AUTO: 0.1 %
BILIRUB SERPL-MCNC: 0.6 MG/DL (ref 0.1–2)
BUN BLD-MCNC: 44 MG/DL (ref 9–23)
CALCIUM BLD-MCNC: 8.1 MG/DL (ref 8.5–10.1)
CHLORIDE SERPL-SCNC: 107 MMOL/L (ref 98–112)
CO2 SERPL-SCNC: 20 MMOL/L (ref 21–32)
CREAT BLD-MCNC: 1.3 MG/DL
EGFRCR SERPLBLD CKD-EPI 2021: 58 ML/MIN/1.73M2 (ref 60–?)
EOSINOPHIL # BLD AUTO: 0.01 X10(3) UL (ref 0–0.7)
EOSINOPHIL NFR BLD AUTO: 0.1 %
ERYTHROCYTE [DISTWIDTH] IN BLOOD BY AUTOMATED COUNT: 16.2 %
ERYTHROCYTE [DISTWIDTH] IN BLOOD BY AUTOMATED COUNT: 16.8 %
GLOBULIN PLAS-MCNC: 2.4 G/DL (ref 2.8–4.4)
GLUCOSE BLD-MCNC: 161 MG/DL (ref 70–99)
GLUCOSE BLD-MCNC: 164 MG/DL (ref 70–99)
GLUCOSE BLD-MCNC: 167 MG/DL (ref 70–99)
GLUCOSE BLD-MCNC: 183 MG/DL (ref 70–99)
GLUCOSE BLD-MCNC: 211 MG/DL (ref 70–99)
HCT VFR BLD AUTO: 32.8 %
HCT VFR BLD AUTO: 37.3 %
HGB BLD-MCNC: 11.2 G/DL
HGB BLD-MCNC: 11.9 G/DL
IMM GRANULOCYTES # BLD AUTO: 0.06 X10(3) UL (ref 0–1)
IMM GRANULOCYTES NFR BLD: 0.7 %
LYMPHOCYTES # BLD AUTO: 0.19 X10(3) UL (ref 1–4)
LYMPHOCYTES NFR BLD AUTO: 2.1 %
MAGNESIUM SERPL-MCNC: 2.3 MG/DL (ref 1.6–2.6)
MCH RBC QN AUTO: 30.7 PG (ref 26–34)
MCH RBC QN AUTO: 31.1 PG (ref 26–34)
MCHC RBC AUTO-ENTMCNC: 31.9 G/DL (ref 31–37)
MCHC RBC AUTO-ENTMCNC: 34.1 G/DL (ref 31–37)
MCV RBC AUTO: 89.9 FL
MCV RBC AUTO: 97.4 FL
MONOCYTES # BLD AUTO: 0.77 X10(3) UL (ref 0.1–1)
MONOCYTES NFR BLD AUTO: 8.5 %
NEUTROPHILS # BLD AUTO: 8.06 X10 (3) UL (ref 1.5–7.7)
NEUTROPHILS # BLD AUTO: 8.06 X10(3) UL (ref 1.5–7.7)
NEUTROPHILS NFR BLD AUTO: 88.5 %
OSMOLALITY SERPL CALC.SUM OF ELEC: 295 MOSM/KG (ref 275–295)
PLATELET # BLD AUTO: 67 10(3)UL (ref 150–450)
PLATELET # BLD AUTO: 90 10(3)UL (ref 150–450)
POTASSIUM SERPL-SCNC: 3.2 MMOL/L (ref 3.5–5.1)
PROT SERPL-MCNC: 4.4 G/DL (ref 6.4–8.2)
RBC # BLD AUTO: 3.65 X10(6)UL
RBC # BLD AUTO: 3.83 X10(6)UL
SODIUM SERPL-SCNC: 135 MMOL/L (ref 136–145)
WBC # BLD AUTO: 7.5 X10(3) UL (ref 4–11)
WBC # BLD AUTO: 9.1 X10(3) UL (ref 4–11)

## 2024-04-27 PROCEDURE — 85027 COMPLETE CBC AUTOMATED: CPT | Performed by: HOSPITALIST

## 2024-04-27 PROCEDURE — 80053 COMPREHEN METABOLIC PANEL: CPT | Performed by: HOSPITALIST

## 2024-04-27 PROCEDURE — 0DJ08ZZ INSPECTION OF UPPER INTESTINAL TRACT, VIA NATURAL OR ARTIFICIAL OPENING ENDOSCOPIC: ICD-10-PCS | Performed by: INTERNAL MEDICINE

## 2024-04-27 PROCEDURE — 85025 COMPLETE CBC W/AUTO DIFF WBC: CPT | Performed by: INTERNAL MEDICINE

## 2024-04-27 PROCEDURE — 82962 GLUCOSE BLOOD TEST: CPT

## 2024-04-27 PROCEDURE — 83735 ASSAY OF MAGNESIUM: CPT | Performed by: HOSPITALIST

## 2024-04-27 PROCEDURE — C9113 INJ PANTOPRAZOLE SODIUM, VIA: HCPCS | Performed by: STUDENT IN AN ORGANIZED HEALTH CARE EDUCATION/TRAINING PROGRAM

## 2024-04-27 RX ORDER — SODIUM CHLORIDE, SODIUM LACTATE, POTASSIUM CHLORIDE, CALCIUM CHLORIDE 600; 310; 30; 20 MG/100ML; MG/100ML; MG/100ML; MG/100ML
INJECTION, SOLUTION INTRAVENOUS CONTINUOUS PRN
Status: DISCONTINUED | OUTPATIENT
Start: 2024-04-27 | End: 2024-04-27 | Stop reason: SURG

## 2024-04-27 RX ORDER — LIDOCAINE HYDROCHLORIDE 10 MG/ML
INJECTION, SOLUTION EPIDURAL; INFILTRATION; INTRACAUDAL; PERINEURAL AS NEEDED
Status: DISCONTINUED | OUTPATIENT
Start: 2024-04-27 | End: 2024-04-27 | Stop reason: SURG

## 2024-04-27 RX ADMIN — LIDOCAINE HYDROCHLORIDE 50 MG: 10 INJECTION, SOLUTION EPIDURAL; INFILTRATION; INTRACAUDAL; PERINEURAL at 12:13:00

## 2024-04-27 RX ADMIN — SODIUM CHLORIDE, SODIUM LACTATE, POTASSIUM CHLORIDE, CALCIUM CHLORIDE: 600; 310; 30; 20 INJECTION, SOLUTION INTRAVENOUS at 12:07:00

## 2024-04-27 NOTE — PROGRESS NOTES
[facility]  Progress Note    Renato Hall Patient Status:  Inpatient    1950 MRN [id number]   [facility] 4NW-A Attending Vianey Otero, *   Hosp Day # 1 PCP Deyanira Resendez MD     Subjective:  Renato Hall is a(n) 73 year old male.    Wife at bedside    Current complaints: none    Denies further dark stool or bm since admit    Reviewed labs w/ them. Labs drawn from port    Role and risk of endoscopy and risk of ongoing bleeding reviewed, reviewed dr flores' egd w/ them as well    Risk of egd including  sedation, bleeding, perforation, infection, miss rate or issues with accuracy, etc and possible morbidity/mortalty discussed.  We discussed risk, benefit, alternatives, limitations as well as not having the procedures.  All questions answered, they demonstrated understanding.        Current Facility-Administered Medications   Medication Dose Route Frequency    potassium chloride 40 mEq in 250mL sodium chloride 0.9% IVPB premix  40 mEq Intravenous Once    sodium chloride 0.9% infusion   Intravenous Continuous    ondansetron (Zofran) 4 MG/2ML injection 4 mg  4 mg Intravenous Q6H PRN    metoclopramide (Reglan) 5 mg/mL injection 10 mg  10 mg Intravenous Q8H PRN    polyethylene glycol (PEG 3350) (Miralax) 17 g oral packet 17 g  17 g Oral Daily PRN    sennosides (Senokot) tab 17.2 mg  17.2 mg Oral Nightly PRN    bisacodyl (Dulcolax) 10 MG rectal suppository 10 mg  10 mg Rectal Daily PRN    fleet enema (Fleet) 7-19 GM/118ML rectal enema 133 mL  1 enema Rectal Once PRN    pantoprazole (Protonix) 40 mg in sodium chloride 0.9% PF 10 mL IV push  40 mg Intravenous Q12H    piperacillin-tazobactam (Zosyn) 3.375 g in dextrose 5% 100 mL IVPB-ADDV  3.375 g Intravenous Q8H    glucose (Dex4) 15 GM/59ML oral liquid 15 g  15 g Oral Q15 Min PRN    Or    glucose (Glutose) 40% oral gel 15 g  15 g Oral Q15 Min PRN    Or    glucose-vitamin C (Dex-4) chewable tab 4 tablet  4 tablet Oral Q15 Min PRN    Or     dextrose 50% injection 50 mL  50 mL Intravenous Q15 Min PRN    Or    glucose (Dex4) 15 GM/59ML oral liquid 30 g  30 g Oral Q15 Min PRN    Or    glucose (Glutose) 40% oral gel 30 g  30 g Oral Q15 Min PRN    Or    glucose-vitamin C (Dex-4) chewable tab 8 tablet  8 tablet Oral Q15 Min PRN    insulin aspart (NovoLOG) 100 Units/mL FlexPen 1-5 Units  1-5 Units Subcutaneous TID AC and HS        Objective:    BP 96/57 (BP Location: Left arm)   Pulse 68   Temp 98.1 °F (36.7 °C) (Axillary)   Resp 18   Wt 178 lb (80.7 kg)   SpO2 98%   BMI 27.88 kg/m²   General appearance: alert, appears stated age, and cooperative, tired appearing but non toxic and NAD  Lungs: clear to auscultation bilaterally  Heart: reg rate   Abdomen: soft, non-tender; bowel sounds normal; no masses,  no organomegaly , no pain w/ palp  Extremities: no le edema  Neurologic: Grossly normal          Labs:     Recent Labs   Lab 04/25/24  1818 04/27/24  0516   RBC 4.69 3.65*   HGB 14.3 11.2*   HCT 41.8 32.8*   MCV 89.1 89.9   MCH 30.5 30.7   MCHC 34.2 34.1   RDW 15.8 16.2   NEPRELIM 15.04*  --    WBC 16.1* 7.5   .0 90.0*       Lab Results   Component Value Date    CREATSERUM 1.30 04/27/2024    BUN 44 04/27/2024     04/27/2024    K 3.2 04/27/2024     04/27/2024    CO2 20.0 04/27/2024     04/27/2024    CA 8.1 04/27/2024    MG 2.3 04/27/2024       Lab Results   Component Value Date    ALB 2.0 04/27/2024    ALKPHO 86 04/27/2024    BILT 0.6 04/27/2024    TP 4.4 04/27/2024    AST 23 04/27/2024    ALT 24 04/27/2024        )    Lab Results   Component Value Date    PGLU 167 04/27/2024           Assessment and Plan:  Patient Active Problem List   Diagnosis    Essential hypertension, benign    Pure hypercholesterolemia    Hyperuricemia    Allergic rhinitis due to pollen    Sensorineural hearing loss, bilateral    Diabetes mellitus type II, non insulin dependent (HCC)    Hypertrophic cardiomyopathy (HCC)    Nasopalatine cyst    Stage 3a  chronic kidney disease (HCC)    Rotator cuff tendinitis, right    Hypercholesterolemia with hypertriglyceridemia    BMI 40.0-44.9, adult (HCC)    Gastric mass    Rectal bleeding    Leukocytosis, unspecified type    Anemia, unspecified type    Hemoglobin drop    MDS (myelodysplastic syndrome) (HCC)    Hyponatremia    Anemia    Azotemia    Hyperglycemia    Gastrointestinal hemorrhage, unspecified gastrointestinal hemorrhage type    Thrombocytopenia (HCC)    Slurred speech    Gait instability    Melanotic stools    ROSCOE (acute kidney injury) (HCC)    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   Plat and hg dropped w/out further bms.  Ongoing bleedng, slow oozing, new process, lab error all possible.    Risk of endoscopy reviewed and that no therapy or therapy that only temporary stops bleeding reviewed    Andrew mcdowellosnohemi reviewed w/ them    --repeat cbc now and bmp  --they will cosnider options and decide if wants egd or not. Npo until then  --advised ongoing heme f/u for long term goals of care and options for further treatment         Wife/pt demonstrated understanding of risks of morbidity/mortality if diagnoses and/or treatments were delayed balanced against risks of further investigation and/or treatment.         --wife/patient demonstrated understanding of the results and recommendations and risks, benefits, limitations, and alternatives to the plan. All of their questions and concerns were addressed and were agreeable to the plan as listed      Ritchie Jane MD

## 2024-04-27 NOTE — OPERATIVE REPORT
ENDOSCOPY OPERATIVE REPORT    Patient Name:  Renato Hall  Medical Record #: [id number]  YOB: 1950  Date of Procedure: 4/27/2024    Preoperative Diagnosis:  melena, acute blood loss anemia    Postoperative Diagnosis:  stricture at gej from gastric mass. Diffusely friable gastric mass encompassing majority of the cardia/body/fundus, very friable to but no active bleeding and yellow/bile colored gastric contents present in stomach    Procedure Performed: Esophagogastroduodenoscopy                Anesthesia Given: MAC          Endoscopist:   Ritchie Jane MD        Procedure:   After the patient was interviewed and the procedure again discussed and questions addressed, the patient was brought to the GI Lab and monitoring of the B/P, pulse, and pulse oximetry was performed. The patient was then placed in the left lateral decubitus position and sedated with divided doses of IV medication; continuous vital signs were monitored throughout the procedure.    A time-out procedure was performed, history and physical were reviewed, medical reconciliation was complete, informed consent was obtained.     The videogastroscope was intubated into the esophagus and advanced into the duodenum under directed vision without difficulty. Then withdrawn. A thorough exam was performed.    The patient tolerated the procedure well.       Findings:  Initially had plastic cap on egd scope    The esophagus mucosa had an overall normal appearance until distal esophagus, tumor and stricturing of gej was noted with clean based ulcer, I did not feel I could pass the EGD scope across this with the cap, scope removed and the plastic cap was removed and the adult EGD scope was able to traverse this area. The Z-line appeared to occurred  at the top of the gastric folds, some distortion due to mass and stricture    The gastric lumen was then entered and views of the gastric mucosa as well as retroverted views of the fundus were  obtained.     Diffusely friable gastric mass encompassing majority of the cardia/body/fundus, very friable to but no active bleeding and yellow/bile colored gastric contents present in stomach.  No active bleeding or old blood noted.  Body had deformity from the mass as well.    A normal pylorus was passed and the duodenal bulb entered, the duodenal bulb and post-bulbar duodenum were unremarkable and no new/old blood present.     The procedure was tolerated well and upon completion and throughtout the vital signs were stable.      Specimens:  See above      Condition on discharge from procedure:  good      PLAN:    --he is at risk for future bleeding from the gastric mass, tissue is friable with passage of the scope  --no specific target for endoscopic therapy noted on this exam but intermittent bleeding is possible.  As the bun has been dropping, he may have had prior bleeding    --recommend ongoing treatment with heme onc , will defer to them if any further role for radiation or other treatment  --monitor hg  --clears today  --stricture at level of GEJ may cause some dysphagia with pills, await swallow evaluation       Discussed results/recommendations with wife/pt after procedure    Ritchie Jane MD  [facility]

## 2024-04-27 NOTE — PLAN OF CARE
Pt a/o x4. VSS on RA. No c/o pain. Meds per MAR. IVF per order. No melena seen. Pending stool studies. Plan for video swallow 4/27.    Fall risk protocol followed, call light within reach.     K replaced per protocol.

## 2024-04-27 NOTE — PROGRESS NOTES
.[facility] note    PCP: Deyanira Resendez MD    Chief Complaint:  F/u melena    SUBJECTIVE:  No melena overnight. Had egd today. Denies pain or sob.     OBJECTIVE:  Temp:  [97.5 °F (36.4 °C)-98.2 °F (36.8 °C)] 98.2 °F (36.8 °C)  Pulse:  [66-74] 71  Resp:  [15-18] 15  BP: (88-98)/(54-60) 98/58  SpO2:  [98 %-99 %] 98 %    Intake/Output:    Intake/Output Summary (Last 24 hours) at 4/27/2024 1153  Last data filed at 4/27/2024 1002  Gross per 24 hour   Intake 1837 ml   Output 500 ml   Net 1337 ml       Last 3 Weights   04/26/24 0020 178 lb (80.7 kg)   04/25/24 1814 170 lb (77.1 kg)   04/05/24 1428 195 lb (88.5 kg)   04/05/24 1200 195 lb (88.5 kg)   04/05/24 0902 195 lb (88.5 kg)   03/24/24 1700 193 lb 14.4 oz (88 kg)   03/24/24 1307 195 lb (88.5 kg)       Exam  Gen: No acute distress  HEENT: anicteric sclera, MMM  Pulm: Lungs clear, normal respiratory effort  CV: Heart with regular rate and rhythm, no peripheral edema  Abd: Abdomen soft, nontender, nondistended, no organomegaly, bowel sounds present  MSK: Full range of motion in extremities, no clubbing, no cyanosis  Skin: no rashes or lesions  Neuro: A&OX3, no focal deficits    Data Review:         Labs:     Recent Labs   Lab 04/25/24 1818 04/27/24  0516 04/27/24  0856   WBC 16.1* 7.5 9.1   HGB 14.3 11.2* 11.9*   MCV 89.1 89.9 97.4   .0 90.0* 67.0*   NE 15.04*  --  8.06*   LYMABS 0.21*  --  0.19*   INR 1.11  --   --        Recent Labs   Lab 04/25/24 1818 04/27/24  0516   * 135*   K 4.6 3.2*   CL 99 107   CO2 20.0* 20.0*   BUN 64* 44*   CREATSERUM 1.38* 1.30   CA 8.5 8.1*   MG  --  2.3   * 164*       Recent Labs   Lab 04/25/24  1818 04/27/24  0516   ALT 31 24   AST 38* 23   ALB 2.6* 2.0*       No results for input(s): \"TROP\", \"CK\", \"PBNP\", \"PCT\" in the last 168 hours.    No results for input(s): \"CRP\", \"KIRBY\", \"LDH\", \"DDIMER\" in the last 168 hours.    Recent Labs   Lab 04/26/24  0556 04/26/24  1120 04/26/24  1620 04/26/24  2149 04/27/24  5827    PGLU 170* 197* 254* 227* 167*       Meds:   Scheduled Medication:   pantoprazole  40 mg Intravenous Q12H    piperacillin-tazobactam  3.375 g Intravenous Q8H    insulin aspart  1-5 Units Subcutaneous TID AC and HS     Continuous Infusing Medication:   sodium chloride 75 mL/hr at 04/27/24 0959     PRN Medication:  ondansetron    metoclopramide    polyethylene glycol (PEG 3350)    sennosides    bisacodyl    fleet enema    glucose **OR** glucose **OR** glucose-vitamin C **OR** dextrose **OR** glucose **OR** glucose **OR** glucose-vitamin C       Microbiology:    Hospital Encounter on 04/25/24   1. 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      **melena, diarrhea in setting of   **recent EGD with ulcerated gastric cardia mass with clot and friable mucosa with 2 endoclips placed 3/25/24 per GI.   - egd 4/27 with friable mass again seen, not actively bleeding. GEJ stricture noted, await swallow eval, unclear timing of this.  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   -CT with progression of metastasis, suspected splenic infarcts d/t tumor burden to vasculature  -heme onc to see this weekend (pt was in endoscopy during rounding attempt, discussed with on-call  MD)    **anemia/thrombocytopenia  - hgb declined from 14.3 to 11.9, plts declined from 162 to 67 but counts have been variable over the past month with admissions related to bleeding from the gastric mass and with the metastatic neuroendocrine cancer.      **incidental finding of L hydronephrosis d/t tumor burden  -creatinine slightly elevated. Urinating ok. UA contaminated.  -urology consult, appreciate     **hyponatremia- on gentle IVF, improved     **acute kidney injury, metabolic acidosis, trial ivf- stable, slightly improved    Stable chronic illnesses:  HTN/HL, CM - hold po meds for now  DMII- iss, accuchecks  Gout     PPx-scds for now     Discussed with pt/wife      Jennifer Valladares MD  [facility]  Pager: [phone]

## 2024-04-27 NOTE — PRE-SEDATION ASSESSMENT
Physician Pre-Sedation Assessment    Pre-Sedation Assessment:    Reviewed labs w/ pt/wife, they want to proceed with egd    Cardiac: reg rate  Respiratory: breath sounds clear bilaterally   Abdomen: soft, BS (+), non-tender        Plan: MAC Sedation

## 2024-04-27 NOTE — PLAN OF CARE
Rec'd pt alert, oriented, slow and slurred speech baseline per family at bedside.  Denies pain but \"tired.\"  Dr. Jane, GI, talked w family and pt who elected for another EGD this morning, thus NPO.  Speech and video swallow team notified that pt is NPO; team aware that video swallow test will be on Monday.  Post EGD, VSS, afebrile, no complaints, poor appetite despite being advanced to clear liquids.  No melena, no stool today, no S/S of bleeding.  Peripheral CBC drawn this morning to compare to port draw, platelets lower at 67K.  Blood sugars being covered with SSI per MAR.  Call light within reach, family at bedside.  Addendum 1850:  No urine output since 0500 on night shift for 500mL.  Bladder scan shows 360mL.  IVF NS at 75mL/hr.  Dr. Valladares aware.    Goal: Electrolytes maintained within normal limits  Description: INTERVENTIONS:  - Monitor labs and rhythm and assess patient for signs and symptoms of electrolyte imbalances  - Administer electrolyte replacement as ordered  - Monitor response to electrolyte replacements, including rhythm and repeat lab results as appropriate  - Fluid restriction as ordered  - Instruct patient on fluid and nutrition restrictions as appropriate  Outcome: Progressing  Goal: Hemodynamic stability and optimal renal function maintained  Description: INTERVENTIONS:  - Monitor labs and assess for signs and symptoms of volume excess or deficit  - Monitor intake, output and patient weight  - Monitor urine specific gravity, serum osmolarity and serum sodium as indicated or ordered  - Monitor response to interventions for patient's volume status, including labs, urine output, blood pressure (other measures as available)  - Encourage oral intake as appropriate  - Instruct patient on fluid and nutrition restrictions as appropriate  Outcome: Progressing     Problem: HEMATOLOGIC - ADULT  Goal: Maintains hematologic stability  Description: INTERVENTIONS  - Assess for signs and symptoms of  bleeding or hemorrhage  - Monitor labs and vital signs for trends  - Administer supportive blood products/factors, fluids and medications as ordered and appropriate  - Administer supportive blood products/factors as ordered and appropriate  Outcome: Progressing  Goal: Free from bleeding injury  Description: (Example usage: patient with low platelets)  INTERVENTIONS:  - Avoid intramuscular injections, enemas and rectal medication administration  - Ensure safe mobilization of patient  - Hold pressure on venipuncture sites to achieve adequate hemostasis  - Assess for signs and symptoms of internal bleeding  - Monitor lab trends  - Patient is to report abnormal signs of bleeding to staff  - Avoid use of toothpicks and dental floss  - Use electric shaver for shaving  - Use soft bristle tooth brush  - Limit straining and forceful nose blowing  Outcome: Progressing     Problem: PAIN - ADULT  Goal: Verbalizes/displays adequate comfort level or patient's stated pain goal  Description: INTERVENTIONS:  - Encourage pt to monitor pain and request assistance  - Assess pain using appropriate pain scale  - Administer analgesics based on type and severity of pain and evaluate response  - Implement non-pharmacological measures as appropriate and evaluate response  - Consider cultural and social influences on pain and pain management  - Manage/alleviate anxiety  - Utilize distraction and/or relaxation techniques  - Monitor for opioid side effects  - Notify MD/LIP if interventions unsuccessful or patient reports new pain  - Anticipate increased pain with activity and pre-medicate as appropriate  Outcome: Progressing

## 2024-04-27 NOTE — PLAN OF CARE
Pt alert/oriented x 4.  Generalized weakness.  Hgb 14 today.  No bowel movement on day shift.  Loss of appetite.  Speech eval done at bedside today, plans for video swallow tomorrow.  Abdomen soft, nontender, active bowel sounds.  Denies nausea/vomiting, denies pain.  BP 90s/50s, asymptomatic, Dr. Otero aware.  Seen by urology and GI.  Updated pt and daughers re: plan of care, verbalizes understanding.  Scheduled meds given per MAR.  Needs addressed, safety measure in place.     Problem: GASTROINTESTINAL - ADULT  Goal: Maintains or returns to baseline bowel function  Description: INTERVENTIONS:  - Assess bowel function  - Maintain adequate hydration with IV or PO as ordered and tolerated  - Evaluate effectiveness of GI medications  - Encourage mobilization and activity  - Obtain nutritional consult as needed  - Establish a toileting routine/schedule  - Consider collaborating with pharmacy to review patient's medication profile  Outcome: Progressing

## 2024-04-27 NOTE — SLP NOTE
Attempted to see pt for VFSS. VFSS put on hold per GI for possible EGD. Will hold VFSS until Monday. Radiology aware. D/w pt's RN.

## 2024-04-27 NOTE — ANESTHESIA POSTPROCEDURE EVALUATION
[facility]    Renato Hall Patient Status:  Inpatient   Age/Gender 73 year old male MRN [id number]   Location [facility] Attending Jennifer Valladares MD   Hosp Day # 1 PCP Deyanira Resendez MD       Anesthesia Post-op Note    ESOPHAGOGASTRODUODENOSCOPY (EGD)    Procedure Summary       Date: 04/27/24 Room / Location:  ENDOSCOPY 03 / [facility]    Anesthesia Start: 1207 Anesthesia Stop:     Procedure: ESOPHAGOGASTRODUODENOSCOPY (EGD) Diagnosis: (gastric mass and GE junction stricture)    Surgeons: Ritchie Jane MD Anesthesiologist: Aldo Arora MD    Anesthesia Type: MAC ASA Status: 3            Anesthesia Type: MAC    Vitals Value Taken Time   BP 73/39 04/27/24 1228   Temp na 04/27/24 1228   Pulse 78 04/27/24 1228   Resp 16 04/27/24 1228   SpO2 97 04/27/24 1228       Patient Location: Endoscopy    Anesthesia Type: MAC    Airway Patency: patent    Postop Pain Control: adequate    Mental Status: mildly sedated but able to meaningfully participate in the post-anesthesia evaluation    Nausea/Vomiting: none    Cardiopulmonary/Hydration status: stable euvolemic    Complications: no apparent anesthesia related complications    Postop vital signs: stable    Dental Exam: Unchanged from Preop    Patient to be discharged from PACU when criteria met.

## 2024-04-27 NOTE — ANESTHESIA PREPROCEDURE EVALUATION
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  Temp: 98.2 °F (36.8 °C)  Pulse: 71  Resp: 15  BP: 98/58  SpO2: 98 %  Body mass index is 27.88 kg/m².    Current medications reviewed.  Hospital Medications:  • [COMPLETED] potassium chloride 40 mEq in 250mL sodium chloride 0.9% IVPB premix  40 mEq Intravenous Once   • sodium chloride 0.9% infusion   Intravenous Continuous   • ondansetron (Zofran) 4 MG/2ML injection 4 mg  4 mg Intravenous Q6H PRN   • metoclopramide (Reglan) 5 mg/mL injection 10 mg  10 mg Intravenous Q8H PRN   • polyethylene glycol (PEG 3350) (Miralax) 17 g oral packet 17 g  17 g Oral Daily PRN   • sennosides (Senokot) tab 17.2 mg  17.2 mg Oral Nightly PRN   • bisacodyl (Dulcolax) 10 MG rectal suppository 10 mg  10 mg Rectal Daily PRN   • fleet enema (Fleet) 7-19 GM/118ML rectal enema 133 mL  1 enema Rectal Once PRN   • pantoprazole (Protonix) 40 mg in sodium chloride 0.9% PF 10 mL IV push  40 mg Intravenous Q12H   • piperacillin-tazobactam (Zosyn) 3.375 g in dextrose 5% 100 mL IVPB-ADDV  3.375 g Intravenous Q8H   • glucose (Dex4) 15 GM/59ML oral liquid 15 g  15 g Oral Q15 Min PRN    Or   • glucose (Glutose) 40% oral gel 15 g  15 g Oral Q15 Min PRN    Or   • glucose-vitamin C (Dex-4) chewable tab 4 tablet  4 tablet Oral Q15 Min PRN    Or   • dextrose 50% injection 50 mL  50 mL Intravenous Q15 Min PRN    Or   • glucose (Dex4) 15 GM/59ML oral liquid 30 g  30 g Oral Q15 Min PRN    Or   • glucose (Glutose) 40% oral gel 30 g  30 g Oral Q15 Min PRN    Or   • glucose-vitamin C (Dex-4) chewable tab 8 tablet  8 tablet Oral Q15 Min  PRN   • insulin aspart (NovoLOG) 100 Units/mL FlexPen 1-5 Units  1-5 Units Subcutaneous TID AC and HS   • [COMPLETED] pantoprazole (Protonix) 40 mg in sodium chloride 0.9% PF 10 mL IV push  40 mg Intravenous Once   • [COMPLETED] sodium chloride 0.9 % IV bolus 1,000 mL  1,000 mL Intravenous Once   • [COMPLETED] iopamidol 76% (ISOVUE-370) injection for power injector  100 mL Intravenous ONCE PRN       Outpatient Medications:     Medications Prior to Admission   Medication Sig Dispense Refill Last Dose   • prochlorperazine (COMPAZINE) 10 mg tablet Take 1 tablet (10 mg total) by mouth every 6 (six) hours as needed for Nausea.   Past Week   • pantoprazole 40 MG Oral Tab EC Take 1 tablet (40 mg total) by mouth 2 (two) times daily before meals. 60 tablet 0 Taking   • sucralfate 1 g Oral Tab Take 1 tablet (1 g total) by mouth 3 (three) times daily before meals. 90 tablet 0 2024 at 1400   • ondansetron 4 MG Oral Tablet Dispersible Take 1 tablet (4 mg total) by mouth every 8 (eight) hours as needed for Nausea.   2024   • allopurinol 300 MG Oral Tab Take 1 tablet (300 mg total) by mouth daily. 90 tablet 1 Taking   • metFORMIN HCl ER (GLUCOPHAGE XR) 500 MG Oral Tablet 24 Hr Take 1 tablet (500 mg total) by mouth daily with breakfast. 90 tablet 1 Past Week   • carvedilol 25 MG Oral Tab Take 1 tablet (25 mg total) by mouth 2 (two) times daily with meals. 180 tablet 1 2024   • Microlet Lancets Does not apply Misc Test daily 100 each 3 2024 at 0700 78uu   • Glucose Blood (CONTOUR NEXT TEST) In Vitro Strip Test blood sugar daily 100 each 1 Taking   • Glucose Blood In Vitro Strip ONE EVERY  strip 3 Taking   • [] dexamethasone (DECADRON) 4 MG tablet Take 1 tablet (4 mg total) by mouth every 12 (twelve) hours for 3 days. 6 tablet 0    • pantoprazole 40 MG Oral Tab EC Take 1 tablet (40 mg total) by mouth 2 (two) times daily before meals. (Patient not taking: Reported on 2024) 60 tablet 0 Not Taking    • rosuvastatin 10 MG Oral Tab Take 1 tablet (10 mg total) by mouth daily. (Patient not taking: Reported on 4/25/2024) 90 tablet 3 4/22/2024   • Fenofibrate 54 MG Oral Tab Take 1 tablet (54 mg total) by mouth daily. with food (Patient not taking: Reported on 4/25/2024) 90 tablet 1 Not Taking       Allergies: Amlodipine      Anesthesia Evaluation    Patient summary reviewed.    Anesthetic Complications  (-) history of anesthetic complications         GI/Hepatic/Renal             (+) chronic renal disease and CRI                   Cardiovascular  Comment: Conclusions:     1. Left ventricle: The cavity size was mildly increased. Wall thickness was      mildly increased with prominence of the basal septum. Systolic function      was normal. The estimated ejection fraction was 65-70%. Doppler      parameters are consistent with abnormal left ventricular relaxation -      grade 1 diastolic dysfunction.   2. Ventricular septum: Basal septum thickness was moderately increased at      1.85 cm. During Valsalva, the gradient across the left ventricular      outflow tract increased to 32 mmHg consistent with mild obstruction.   3. Right ventricle: Systolic function was normal.   4. Left atrium: The left atrial volume was mildly increased.   5. Aortic valve: Trileaflet; mildly calcified leaflets. Trivial      regurgitation.   6. Mitral valve: Mildly calcified annulus. There was mild systolic anterior      motion of the mitral valve without significant regurgitation.   7. Pulmonary arteries: Systolic pressure was mildly increased, in the range      of 40mm Hg to 45mm Hg. Estimated pulmonary artery diastolic pressure was      9mm Hg.     Impressions:  This study is compared with previous dated 08/18/2020: The   gradient across the left ventricular outflow tract is increased.       ECG reviewed.  Exercise tolerance: good     MET: >4      (+) hypertension   (+) hyperlipidemia                  (+) CHF                 Endo/Other      (+) diabetes                            Pulmonary    Negative pulmonary ROS.                       Neuro/Psych                                  Past Surgical History:   Procedure Laterality Date   • Cholecystectomy  1988   • Colonoscopy  2008    tiny polyps   • Colonoscopy  2014    Procedure: COLONOSCOPY;  Surgeon: Liang Quevedo MD;  Location:  ENDOSCOPY   • Colonoscopy N/A 2017    Procedure: COLONOSCOPY;  Surgeon: Liang Quevedo MD;  Location:  ENDOSCOPY   • Colonoscopy     • Peripheral vascular screening historical conv Bilateral 2017    mild carotid narrowing, PAD screen   • Upper gi endoscopy,exam     • Vascular lab - dmg Bilateral 2011    PAD screening normal     Social History     Socioeconomic History   • Marital status:      Spouse name: Dayan   • Number of children: 2   • Years of education: 12   Occupational History   • Occupation:      Comment: self employed   Tobacco Use   • Smoking status: Former     Current packs/day: 0.00     Average packs/day: 1 pack/day for 17.0 years (17.0 ttl pk-yrs)     Types: Cigarettes     Start date: 1969     Quit date: 1986     Years since quittin.3   • Smokeless tobacco: Never   Vaping Use   • Vaping status: Never Used   Substance and Sexual Activity   • Alcohol use: No     Alcohol/week: 0.0 standard drinks of alcohol   • Drug use: No   • Sexual activity: Yes     Partners: Female   Other Topics Concern   •  Service Yes     Comment: [employer]    • Blood Transfusions No   • Caffeine Concern Yes     Comment: coffee 1 cup a day   • Occupational Exposure Yes     Comment: asbestos   • Hobby Hazards No   • Sleep Concern No   • Stress Concern No   • Weight Concern Yes   • Special Diet No   • Back Care No   • Exercise Yes     Comment: walk   • Bike Helmet No   • Seat Belt Yes   • Self-Exams No     History   Drug Use No     Available pre-op labs reviewed.  Lab Results   Component  Value Date    WBC 9.1 04/27/2024    RBC 3.83 04/27/2024    HGB 11.9 (L) 04/27/2024    HCT 37.3 (L) 04/27/2024    MCV 97.4 04/27/2024    MCH 31.1 04/27/2024    MCHC 31.9 04/27/2024    RDW 16.8 04/27/2024    PLT 67.0 (L) 04/27/2024     Lab Results   Component Value Date     (L) 04/27/2024    K 3.2 (L) 04/27/2024     04/27/2024    CO2 20.0 (L) 04/27/2024    BUN 44 (H) 04/27/2024    CREATSERUM 1.30 04/27/2024     (H) 04/27/2024    CA 8.1 (L) 04/27/2024     Lab Results   Component Value Date    INR 1.11 04/25/2024         Airway      Mallampati: II  Mouth opening: >3 FB  TM distance: > 6 cm  Neck ROM: full Cardiovascular    Cardiovascular exam normal.  Rhythm: regular  Rate: normal     Dental             Pulmonary    Pulmonary exam normal.  Breath sounds clear to auscultation bilaterally.               Other findings        ASA: 3   Plan: MAC  NPO status verified and patient meets guidelines.    Post-procedure pain management plan discussed with surgeon and patient.      Plan/risks discussed with: patient            Present on Admission:  **None**

## 2024-04-28 LAB
ANION GAP SERPL CALC-SCNC: 8 MMOL/L (ref 0–18)
BASOPHILS # BLD AUTO: 0 X10(3) UL (ref 0–0.2)
BASOPHILS NFR BLD AUTO: 0 %
BUN BLD-MCNC: 37 MG/DL (ref 9–23)
CALCIUM BLD-MCNC: 7.9 MG/DL (ref 8.5–10.1)
CHLORIDE SERPL-SCNC: 112 MMOL/L (ref 98–112)
CO2 SERPL-SCNC: 19 MMOL/L (ref 21–32)
CREAT BLD-MCNC: 1.08 MG/DL
EGFRCR SERPLBLD CKD-EPI 2021: 72 ML/MIN/1.73M2 (ref 60–?)
EOSINOPHIL # BLD AUTO: 0.01 X10(3) UL (ref 0–0.7)
EOSINOPHIL NFR BLD AUTO: 0.1 %
ERYTHROCYTE [DISTWIDTH] IN BLOOD BY AUTOMATED COUNT: 16.6 %
GLUCOSE BLD-MCNC: 165 MG/DL (ref 70–99)
GLUCOSE BLD-MCNC: 168 MG/DL (ref 70–99)
GLUCOSE BLD-MCNC: 170 MG/DL (ref 70–99)
GLUCOSE BLD-MCNC: 174 MG/DL (ref 70–99)
GLUCOSE BLD-MCNC: 175 MG/DL (ref 70–99)
HCT VFR BLD AUTO: 32.9 %
HGB BLD-MCNC: 10.5 G/DL
IMM GRANULOCYTES # BLD AUTO: 0.04 X10(3) UL (ref 0–1)
IMM GRANULOCYTES NFR BLD: 0.6 %
LYMPHOCYTES # BLD AUTO: 0.11 X10(3) UL (ref 1–4)
LYMPHOCYTES NFR BLD AUTO: 1.5 %
MCH RBC QN AUTO: 30.4 PG (ref 26–34)
MCHC RBC AUTO-ENTMCNC: 31.9 G/DL (ref 31–37)
MCV RBC AUTO: 95.4 FL
MONOCYTES # BLD AUTO: 0.23 X10(3) UL (ref 0.1–1)
MONOCYTES NFR BLD AUTO: 3.2 %
NEUTROPHILS # BLD AUTO: 6.78 X10 (3) UL (ref 1.5–7.7)
NEUTROPHILS # BLD AUTO: 6.78 X10(3) UL (ref 1.5–7.7)
NEUTROPHILS NFR BLD AUTO: 94.6 %
OSMOLALITY SERPL CALC.SUM OF ELEC: 301 MOSM/KG (ref 275–295)
PLATELET # BLD AUTO: 78 10(3)UL (ref 150–450)
POTASSIUM SERPL-SCNC: 3.4 MMOL/L (ref 3.5–5.1)
POTASSIUM SERPL-SCNC: 3.4 MMOL/L (ref 3.5–5.1)
RBC # BLD AUTO: 3.45 X10(6)UL
SODIUM SERPL-SCNC: 139 MMOL/L (ref 136–145)
WBC # BLD AUTO: 7.2 X10(3) UL (ref 4–11)

## 2024-04-28 PROCEDURE — 84132 ASSAY OF SERUM POTASSIUM: CPT | Performed by: HOSPITALIST

## 2024-04-28 PROCEDURE — 85025 COMPLETE CBC W/AUTO DIFF WBC: CPT | Performed by: INTERNAL MEDICINE

## 2024-04-28 PROCEDURE — C9113 INJ PANTOPRAZOLE SODIUM, VIA: HCPCS | Performed by: STUDENT IN AN ORGANIZED HEALTH CARE EDUCATION/TRAINING PROGRAM

## 2024-04-28 PROCEDURE — 82962 GLUCOSE BLOOD TEST: CPT

## 2024-04-28 PROCEDURE — 80048 BASIC METABOLIC PNL TOTAL CA: CPT | Performed by: INTERNAL MEDICINE

## 2024-04-28 RX ORDER — DEXAMETHASONE 4 MG/1
4 TABLET ORAL EVERY 8 HOURS SCHEDULED
Status: DISCONTINUED | OUTPATIENT
Start: 2024-04-28 | End: 2024-04-28

## 2024-04-28 RX ORDER — DEXAMETHASONE SODIUM PHOSPHATE 4 MG/ML
4 VIAL (ML) INJECTION EVERY 8 HOURS
Status: DISCONTINUED | OUTPATIENT
Start: 2024-04-28 | End: 2024-04-30

## 2024-04-28 NOTE — PROGRESS NOTES
[facility]  Progress Note    Renato Hall Patient Status:  Inpatient    1950 MRN [id number]   [facility] Attending Jennifer Valladares MD   Hosp Day # 2 PCP Deyanira Resendez MD     Subjective:  Renato Hall is a(n) 73 year old male.    Wife at bedside    Current complaints: denies bm's or melena or overt gi bleeding    Reviewed egd w/ them again today      Current Facility-Administered Medications   Medication Dose Route Frequency    potassium chloride 40 mEq in 250mL sodium chloride 0.9% IVPB premix  40 mEq Intravenous Once    heparin (Porcine) 100 Units/mL lock flush 500 Units  5 mL Intravenous PRN    sodium chloride 0.9% infusion   Intravenous Continuous    ondansetron (Zofran) 4 MG/2ML injection 4 mg  4 mg Intravenous Q6H PRN    metoclopramide (Reglan) 5 mg/mL injection 10 mg  10 mg Intravenous Q8H PRN    polyethylene glycol (PEG 3350) (Miralax) 17 g oral packet 17 g  17 g Oral Daily PRN    sennosides (Senokot) tab 17.2 mg  17.2 mg Oral Nightly PRN    bisacodyl (Dulcolax) 10 MG rectal suppository 10 mg  10 mg Rectal Daily PRN    fleet enema (Fleet) 7-19 GM/118ML rectal enema 133 mL  1 enema Rectal Once PRN    pantoprazole (Protonix) 40 mg in sodium chloride 0.9% PF 10 mL IV push  40 mg Intravenous Q12H    piperacillin-tazobactam (Zosyn) 3.375 g in dextrose 5% 100 mL IVPB-ADDV  3.375 g Intravenous Q8H    glucose (Dex4) 15 GM/59ML oral liquid 15 g  15 g Oral Q15 Min PRN    Or    glucose (Glutose) 40% oral gel 15 g  15 g Oral Q15 Min PRN    Or    glucose-vitamin C (Dex-4) chewable tab 4 tablet  4 tablet Oral Q15 Min PRN    Or    dextrose 50% injection 50 mL  50 mL Intravenous Q15 Min PRN    Or    glucose (Dex4) 15 GM/59ML oral liquid 30 g  30 g Oral Q15 Min PRN    Or    glucose (Glutose) 40% oral gel 30 g  30 g Oral Q15 Min PRN    Or    glucose-vitamin C (Dex-4) chewable tab 8 tablet  8 tablet Oral Q15 Min PRN    insulin aspart (NovoLOG) 100 Units/mL FlexPen 1-5 Units  1-5 Units  Subcutaneous TID AC and HS        Objective:    /55 (BP Location: Right arm)   Pulse 68   Temp 97.8 °F (36.6 °C) (Oral)   Resp 16   Wt 178 lb (80.7 kg)   SpO2 96%   BMI 27.88 kg/m²   General appearance: alert, appears stated age, and cooperative  Heart: reg rate   Abdomen: soft, non-tender; bowel sounds normal; no masses,  no organomegaly   Neurologic: Grossly normal, ox3        Labs:     Recent Labs   Lab 04/27/24  0516 04/27/24  0856 04/28/24  0745   RBC 3.65* 3.83 3.45*   HGB 11.2* 11.9* 10.5*   HCT 32.8* 37.3* 32.9*   MCV 89.9 97.4 95.4   MCH 30.7 31.1 30.4   MCHC 34.1 31.9 31.9   RDW 16.2 16.8 16.6   NEPRELIM  --  8.06* 6.78   WBC 7.5 9.1 7.2   PLT 90.0* 67.0* 78.0*       Lab Results   Component Value Date    CREATSERUM 1.08 04/28/2024    BUN 37 04/28/2024     04/28/2024    K 3.4 04/28/2024    K 3.4 04/28/2024     04/28/2024    CO2 19.0 04/28/2024     04/28/2024    CA 7.9 04/28/2024             )    Lab Results   Component Value Date    PGLU 165 04/28/2024           Assessment and Plan:  Patient Active Problem List   Diagnosis    Essential hypertension, benign    Pure hypercholesterolemia    Hyperuricemia    Allergic rhinitis due to pollen    Sensorineural hearing loss, bilateral    Diabetes mellitus type II, non insulin dependent (HCC)    Hypertrophic cardiomyopathy (HCC)    Nasopalatine cyst    Stage 3a chronic kidney disease (HCC)    Rotator cuff tendinitis, right    Hypercholesterolemia with hypertriglyceridemia    BMI 40.0-44.9, adult (HCC)    Gastric mass    Rectal bleeding    Leukocytosis, unspecified type    Anemia, unspecified type    Hemoglobin drop    MDS (myelodysplastic syndrome) (HCC)    Hyponatremia    Anemia    Azotemia    Hyperglycemia    Gastrointestinal hemorrhage, unspecified gastrointestinal hemorrhage type    Thrombocytopenia (HCC)    Slurred speech    Gait instability    Melanotic stools    ROSCOE (acute kidney injury) (HCC)    Neuroendocrine carcinoma  metastatic to brain (HCC)       1 melena  2 ulcerated gastric mass  3 acute blood loss anemia    Mild improvement  in bun. No overt bleeding  --discussed he is at risk for ongoing or recurrent bleeding  --no endoscopic targets from yesterday's egd  --discussed w/ them stricture at gej may progress, has risk of bleeding etc if attempt at dilation and at some point he may need to transition to soft      Will sign off for now, please call w/ questions      --wife/patient demonstrated understanding of the results and recommendations and risks, benefits, limitations, and alternatives to the plan. All of their questions and concerns were addressed and were agreeable to the plan as listed      Ritchie Jane MD

## 2024-04-28 NOTE — CM/SW NOTE
SW received MDO for hospice. SRINIVASAN contacted Imani from [facility] and made referral for f/up.     Sheridan Mchugh, PHAN - [phone]

## 2024-04-28 NOTE — HOSPICE RN NOTE
Referral received.  Aidin sent.  [facility] will reach out to family to arrange informational meeting.   Imani Inman RN  [facility] TNL/SOC RN  [phone]

## 2024-04-28 NOTE — PROGRESS NOTES
.[facility] note    PCP: Deyanira Resendez MD    Chief Complaint:  F/u melena    SUBJECTIVE:  Feels weak. Denies pain. Only taking lemon ice    OBJECTIVE:  Temp:  [97.4 °F (36.3 °C)-98.5 °F (36.9 °C)] 98.5 °F (36.9 °C)  Pulse:  [66-78] 70  Resp:  [11-16] 15  BP: ()/(55-73) 99/63  SpO2:  [96 %-97 %] 97 %    Intake/Output:    Intake/Output Summary (Last 24 hours) at 4/28/2024 1250  Last data filed at 4/28/2024 1100  Gross per 24 hour   Intake 3522 ml   Output 400 ml   Net 3122 ml       Last 3 Weights   04/26/24 0020 178 lb (80.7 kg)   04/25/24 1814 170 lb (77.1 kg)   04/05/24 1428 195 lb (88.5 kg)   04/05/24 1200 195 lb (88.5 kg)   04/05/24 0902 195 lb (88.5 kg)   03/24/24 1700 193 lb 14.4 oz (88 kg)   03/24/24 1307 195 lb (88.5 kg)       Exam  Gen: No acute distress  HEENT: anicteric sclera, MMM  Pulm: Lungs clear, normal respiratory effort  CV: Heart with regular rate and rhythm, no peripheral edema  Abd: Abdomen soft, nontender, nondistended, no organomegaly, bowel sounds present  MSK: Full range of motion in extremities, no clubbing, no cyanosis  Skin: no rashes or lesions  Neuro: A&OX3, no focal deficits    Data Review:         Labs:     Recent Labs   Lab 04/25/24  1818 04/27/24  0516 04/27/24  0856 04/28/24  0745   WBC 16.1* 7.5 9.1 7.2   HGB 14.3 11.2* 11.9* 10.5*   MCV 89.1 89.9 97.4 95.4   .0 90.0* 67.0* 78.0*   NE 15.04*  --  8.06* 6.78   LYMABS 0.21*  --  0.19* 0.11*   INR 1.11  --   --   --        Recent Labs   Lab 04/25/24 1818 04/27/24  0516 04/28/24  0745   * 135* 139   K 4.6 3.2* 3.4*  3.4*   CL 99 107 112   CO2 20.0* 20.0* 19.0*   BUN 64* 44* 37*   CREATSERUM 1.38* 1.30 1.08   CA 8.5 8.1* 7.9*   MG  --  2.3  --    * 164* 168*       Recent Labs   Lab 04/25/24 1818 04/27/24  0516   ALT 31 24   AST 38* 23   ALB 2.6* 2.0*       No results for input(s): \"TROP\", \"CK\", \"PBNP\", \"PCT\" in the last 168 hours.    No results for input(s): \"CRP\", \"KIRBY\", \"LDH\", \"DDIMER\" in the last 168  hours.    Recent Labs   Lab 04/27/24  1307 04/27/24  1647 04/27/24  2109 04/28/24  0517 04/28/24  1150   PGLU 161* 183* 211* 165* 170*       Meds:   Scheduled Medication:   potassium chloride  40 mEq Intravenous Once    dexamethasone  4 mg Oral Q8H BETH    pantoprazole  40 mg Intravenous Q12H    piperacillin-tazobactam  3.375 g Intravenous Q8H    insulin aspart  1-5 Units Subcutaneous TID AC and HS     Continuous Infusing Medication:   sodium chloride 75 mL/hr at 04/28/24 1052     PRN Medication:  heparin    ondansetron    metoclopramide    polyethylene glycol (PEG 3350)    sennosides    bisacodyl    fleet enema    glucose **OR** glucose **OR** glucose-vitamin C **OR** dextrose **OR** glucose **OR** glucose **OR** glucose-vitamin C       Microbiology:    Hospital Encounter on 04/25/24   1. 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      **melena, diarrhea in setting of   **recent EGD with ulcerated gastric cardia mass with clot and friable mucosa with 2 endoclips placed 3/25/24 per GI.   - egd 4/27 with friable mass again seen, not actively bleeding. GEJ stricture noted, await swallow eval tomorrow  -cont ppi  -okay for clears. Further diet advancement pending swallow eval results.  -on empiric abx zosyn, not sure there is still a clear indication for this. Will stop.     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   -CT with progression of metastasis, suspected splenic infarcts d/t tumor burden to vasculature  -appreciate heme-onc recs. Discussed with pt and wife. Agreeable to hospice consult for more info. Wife tells me that she just wants him to be comfortable. May consider palliative if need to delineate goals of care a bit more.  - decadron restarted    **anemia/thrombocytopenia  - hgb declined from 14.3 to 11.9, plts declined from 162 to 67 but counts have been variable over the past month with admissions related to bleeding from the gastric mass and with the metastatic neuroendocrine cancer.   - relatively stable on today's cbc     **incidental finding of L hydronephrosis d/t tumor burden  -creatinine slightly elevated. Urinating ok. UA contaminated.  -urology consult, appreciate. No plan for intervention at this time given discussion of above goals of care     **hyponatremia- on gentle IVF, improved     **acute kidney injury, metabolic acidosis, trial ivf- stable, slightly improved    Stable chronic illnesses:  HTN/HL, CM - hold po meds for now  DMII- iss, accuchecks  Gout     PPx-scds for now     Discussed with pt/wife/rn/gi      Jennifer Valladares MD  [facility]  Pager: [phone]

## 2024-04-28 NOTE — HOSPICE RN NOTE
Met with pt and spouse to discuss hospice philosophy, services, levels and goals of care.  All questions answered.  Pt and spouse want to speak with Dr. Rivers in am before making any decisions.  [facility] to follow up after pt and family have had an opportunity to speak with MD.  [facility] Companion book  and contact information left.  Pt and spouse encouraged to call if services needed sooner. Pt does not meet GIP criteria today.  KILO Roach updated.  Imani Inman RN  [facility] TNL/SOC RN  [phone]

## 2024-04-28 NOTE — PLAN OF CARE
Pt received A&Ox4. VSS. Afebrile. Denies pain. Meds given per MAR. Zosyn administered. IVF infusing at 75mL/h. Repositioned throughout the night. Fall precautions in place. Call light within reach.

## 2024-04-28 NOTE — PROGRESS NOTES
[facility]   part of [facility]    Progress Note    Renato Hall Patient Status:  Inpatient    1950 MRN [id number]   Location [facility] 4NW-A Attending Jennifer Valladares MD   Hosp Day # 2 PCP Deyanira Resendez MD     Subjective:   Renato Hall is a a(n) 73 year old male  with mmp including but not limited to metastatic gastric neuroendocrine cancer, HTN/HL, CM, DMII, gout, slurred speech and 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      Endoscopy yesterday with GI.     Creatinine yesterday was 1.30.  AM labs today pending. 400mL UOP.     Wife is with patient at bedside this AM.    Objective:   Blood pressure 103/55, pulse 68, temperature 97.8 °F (36.6 °C), temperature source Oral, resp. rate 16, weight 178 lb (80.7 kg), SpO2 96%.    General appearance: alert and cooperative  Head: Normocephalic, without obvious abnormality, atraumatic  Pulmonary:  Non labored respirations  Abdominal: soft, non-tender; bowel sounds normal; no masses,  no organomegaly  Psychiatric: calm    Results:   Lab Results   Component Value Date    WBC 9.1 2024    HGB 11.9 (L) 2024    HCT 37.3 (L) 2024    PLT 67.0 (L) 2024    CREATSERUM 1.30 2024    BUN 44 (H) 2024     (L) 2024    K 3.2 (L) 2024     2024    CO2 20.0 (L) 2024     (H) 2024    CA 8.1 (L) 2024    ALB 2.0 (L) 2024    ALKPHO 86 2024    BILT 0.6 2024    TP 4.4 (L) 2024    AST 23 2024    ALT 24 2024    PTT 28.4 2024    INR 1.11 2024    TSH 2.930 2021    PSA 0.572 2021    MG 2.3 2024     2021       No results found.        Assessment & Plan:   73 year old gentleman with a metastatic gastric neuroendocrine tumor.  Urology has been consulted for left hydronephrosis, which appears to be caused by extrinsic ureteral compression from his  malignancy.     I discussed with the patient and his wife that he should have a goals of care discussion with his medical oncologist to determine if there are plans for additional treatment, versus a palliative approach.  We discussed that if the patient and his wife have plans for additional cancer treatments, then he should consider cystoscopy and ureteral stent placement, to help un-obstruct the kidney, with the goal of helping preserve renal function.    We discussed that because he is asymptomatic and his renal function remains relatively stable, a stent placement is not urgently needed.  I would recommend they meet with his medical oncologist to discuss goals of care and plans for additional treatment.      I put my information into his discharge paperwork, and I would be glad to meet with him as an outpatient in the coming weeks, to discuss the option of ureteral stent placement.    Urology will sign off at this point.  Please call with questions or if additional evaluation is needed by our service.     Maximilian Infante MD  4/28/2024

## 2024-04-28 NOTE — CONSULTS
[facility]  Hematology Oncology Consultation  2024    Renato Hall Patient Status:  Inpatient    1950 MRN [id number]   Location [facility] 4NW-A Attending Jennifer Valladares MD   Hosp Day # 2 PCP Deyanira Resendez MD     Reason for Consultation:  Neuroendocrine tumor of stomach, melena    History of Present Illness:  Pt is a 72 yo male, pt of Dr. 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  Last office visit with Dr. Henning on 3/1, at that time he was responding to topotecan based on CT in 2024.  He was admitted to [facility] end of 2024 with GI bleed, Hgb 5's, EGD showed ulcerated cardia mass, s/p enodclips x 2.  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    Referred to Rad Onc as outpt, treated by Dr. Cristobal Duran, recently completed 9 of planned 10 fractions of WBRT on  and completed 8 planned fractions of palliative RT to stomach to control bleeding.  He missed his 10th fraction of brain RT on  due to being in ED.  Outpt CT on  for RT planning with progression of disease compared to , new liver mets, enlarging gastrohepatic node, new thickening of L adrenal gland, sclerotic lesions in bilateral iliac wings (new?) and L1 (seen prior).    Hgb 8's upon discharge in March, stable in 9's during readmission in April.  Pt presented to ED on  with melena, generalized weakness.  Initial Hgb here 14 --> 11's, stable at 10.5 today.  Pt not seen on  as he was down at EGD, which showed stricture at GEJ due to gastric mass, diffusely friable gastric mass encompassing majority of cardia, body, fundus, very friable but no active bleeding.  CT a/p here (compared to ) with progression of disease, new liver mets, infiltrating malignant process in upper abd, extending to L retroperitoneal LAD, developing splenic infarcts with compromise of L splenic vein and  splenic artery by tumor, L hydronephrosis, possibly 2ard to tumor implants around the proximal L ureter, sclerotic metastatic bone lesions.    History taken from pt and wife.  He has been generally weak during radiation, PS 2-3, at home sits up in recliner.      History:  Past Medical History:    Abnormal screening CT of heart    calcium score 6    Abnormal screening CT of heart    calcium score of 18    Abnormal screening CT of heart    calcium score 53    Adenomatous colon polyp    Allergic rhinitis due to pollen    BPH (benign prostatic hyperplasia)    Cancer (HCC)    Neuroendocrine CA/Pancreatic    Cardiomyopathy (HCC)    2D Echo 9/22/23    COVID    No hospitalization. Harsh coughing    Degeneration of cervical intervertebral disc    Diverticula of colon    Essential hypertension, benign    GOUT    High blood pressure    High cholesterol    History of blood transfusion    No reaction    Hypertrophic cardiomyopathy (HCC)    Personal history of antineoplastic chemotherapy    Last treatment    Pure hypercholesterolemia    Tinnitus, bilateral    Type II or unspecified type diabetes mellitus without mention of complication, not stated as uncontrolled    Visual impairment    Glasses     Past Surgical History:   Procedure Laterality Date    Cholecystectomy  06/21/1988    Colonoscopy  04/21/2008    tiny polyps    Colonoscopy  08/27/2014    Procedure: COLONOSCOPY;  Surgeon: Liang Quevedo MD;  Location:  ENDOSCOPY    Colonoscopy N/A 09/20/2017    Procedure: COLONOSCOPY;  Surgeon: Liang Quevedo MD;  Location:  ENDOSCOPY    Colonoscopy      Peripheral vascular screening historical conv Bilateral 05/22/2017    mild carotid narrowing, PAD screen    Upper gi endoscopy,exam      Vascular lab - dmg Bilateral 11/01/2011    PAD screening normal     Family History   Problem Relation Age of Onset    Arthritis Father         TKA    Hypertension Father     Diabetes Father     Obesity Father     Cancer Mother 70         colon, ?uterine    Genito-Urinary Disorder Mother         hysterectomy    Substance Abuse Son     Asthma Son     High Cholesterol Son     Gastro-Intestinal Disorder Brother         colon polyps, GERD    Hypertension Brother     Neurological Disorder Daughter         MS    Diabetes Daughter     Hypertension Sister     Diabetes Sister     Lipids Sister       reports that he quit smoking about 38 years ago. His smoking use included cigarettes. He started smoking about 55 years ago. He has a 17 pack-year smoking history. He has never used smokeless tobacco. He reports that he does not drink alcohol and does not use drugs.    Allergies:  Allergies   Allergen Reactions    Amlodipine SWELLING     Ankle swelling on amlodipine.       Medications:    Current Facility-Administered Medications:     heparin (Porcine) 100 Units/mL lock flush 500 Units, 5 mL, Intravenous, PRN    sodium chloride 0.9% infusion, , Intravenous, Continuous    ondansetron (Zofran) 4 MG/2ML injection 4 mg, 4 mg, Intravenous, Q6H PRN    metoclopramide (Reglan) 5 mg/mL injection 10 mg, 10 mg, Intravenous, Q8H PRN    polyethylene glycol (PEG 3350) (Miralax) 17 g oral packet 17 g, 17 g, Oral, Daily PRN    sennosides (Senokot) tab 17.2 mg, 17.2 mg, Oral, Nightly PRN    bisacodyl (Dulcolax) 10 MG rectal suppository 10 mg, 10 mg, Rectal, Daily PRN    fleet enema (Fleet) 7-19 GM/118ML rectal enema 133 mL, 1 enema, Rectal, Once PRN    pantoprazole (Protonix) 40 mg in sodium chloride 0.9% PF 10 mL IV push, 40 mg, Intravenous, Q12H    piperacillin-tazobactam (Zosyn) 3.375 g in dextrose 5% 100 mL IVPB-ADDV, 3.375 g, Intravenous, Q8H    glucose (Dex4) 15 GM/59ML oral liquid 15 g, 15 g, Oral, Q15 Min PRN **OR** glucose (Glutose) 40% oral gel 15 g, 15 g, Oral, Q15 Min PRN **OR** glucose-vitamin C (Dex-4) chewable tab 4 tablet, 4 tablet, Oral, Q15 Min PRN **OR** dextrose 50% injection 50 mL, 50 mL, Intravenous, Q15 Min PRN **OR** glucose (Dex4) 15 GM/59ML oral liquid 30  g, 30 g, Oral, Q15 Min PRN **OR** glucose (Glutose) 40% oral gel 30 g, 30 g, Oral, Q15 Min PRN **OR** glucose-vitamin C (Dex-4) chewable tab 8 tablet, 8 tablet, Oral, Q15 Min PRN    insulin aspart (NovoLOG) 100 Units/mL FlexPen 1-5 Units, 1-5 Units, Subcutaneous, TID AC and HS    Review of Systems:  A 10-point review of systems was done with pertinent positives and negatives per the HPI.    Physical Exam:   Blood pressure 103/55, pulse 68, temperature 97.8 °F (36.6 °C), temperature source Oral, resp. rate 16, weight 178 lb (80.7 kg), SpO2 96%.  General: Patient is weak appearing, alert and oriented x 3, not in acute distress.  HEENT: EOMs intact. PERRL. Oropharynx is clear.   Neck: No palpable lymphadenopathy. Neck is supple.  Chest: Clear to auscultation.  Heart: Regular rate and rhythm.   Abdomen: Soft, non tender with good bowel sounds.  Extremities: Pedal pulses are present. No edema.  Neurological: Grossly intact.   Lymphatics: There is no palpable lymphadenopathy throughout in the cervical or supraclavicular, regions.  ECOG PS: 2-3    Laboratory Data:    Lab Results   Component Value Date    WBC 7.2 04/28/2024    HGB 10.5 04/28/2024    HCT 32.9 04/28/2024    PLT 78.0 04/28/2024    PGLU 165 04/28/2024       Recent Labs   Lab 04/25/24  1818 04/27/24  0516 04/27/24  0856 04/28/24  0745   RBC 4.69 3.65* 3.83 3.45*   HGB 14.3 11.2* 11.9* 10.5*   HCT 41.8 32.8* 37.3* 32.9*   MCV 89.1 89.9 97.4 95.4   MCH 30.5 30.7 31.1 30.4   MCHC 34.2 34.1 31.9 31.9   RDW 15.8 16.2 16.8 16.6   NEPRELIM 15.04*  --  8.06* 6.78   WBC 16.1* 7.5 9.1 7.2   .0 90.0* 67.0* 78.0*         Recent Labs   Lab 04/25/24  1818 04/27/24  0516   * 164*   BUN 64* 44*   CREATSERUM 1.38* 1.30   EGFRCR 54* 58*   CA 8.5 8.1*   ALB 2.6* 2.0*   * 135*   K 4.6 3.2*   CL 99 107   CO2 20.0* 20.0*   ALKPHO 107 86   AST 38* 23   ALT 31 24   BILT 0.9 0.6   TP 5.7* 4.4*       Imaging:  reviewed    Impression and Plan:    Metastatic poorly  differentiated neuroendocrine tumor of stomach  Progression of disease on 2nd line topotecan  Intracranial and leptomeningeal mets, s/p WBRT, completed 4/25  GI bleed from tumor in March 2024, s/p palliative RT to gastric tumor    Discussion held with pt and his wife, explained to them he has progression of disease (new liver mets), current poor PS precludes further treatment.  He understands he is too weak for chemotherapy and does not want further procedures.  Offered palliative care consult and they will consider this.  Hospice is appropriate.    Will confirm outpt dose of steroids he was on for brain mets (wife states he was on 4 mg q8 and has not been instructed to taper yet) and resume this as inpt.  Thrombocytopenia - not due to chemo as last treatment over 1 month ago and plts had recovered.  May be consumptive due to GI bleed.  Currently no active bleeding, no need for PRBC or plts transfusion today.  Check CBC daily.  Continue discussion on goals of care, pt and wife are realistic about his poor prognosis.    Dr. Rivers to cover starting Mon 4/29.    Please do not hesitate to contact me directly with any specific questions or concerns.    Roya De La Torre MD  [facility]  Department of Oncology and Hematology

## 2024-04-29 ENCOUNTER — APPOINTMENT (OUTPATIENT)
Dept: GENERAL RADIOLOGY | Facility: HOSPITAL | Age: 74
End: 2024-04-29
Attending: HOSPITALIST
Payer: MEDICARE

## 2024-04-29 ENCOUNTER — APPOINTMENT (OUTPATIENT)
Dept: GENERAL RADIOLOGY | Facility: HOSPITAL | Age: 74
DRG: 377 | End: 2024-04-29
Attending: HOSPITALIST
Payer: MEDICARE

## 2024-04-29 LAB
ANION GAP SERPL CALC-SCNC: 9 MMOL/L (ref 0–18)
BASOPHILS # BLD AUTO: 0 X10(3) UL (ref 0–0.2)
BASOPHILS NFR BLD AUTO: 0 %
BUN BLD-MCNC: 29 MG/DL (ref 9–23)
CALCIUM BLD-MCNC: 7.9 MG/DL (ref 8.5–10.1)
CHLORIDE SERPL-SCNC: 115 MMOL/L (ref 98–112)
CO2 SERPL-SCNC: 16 MMOL/L (ref 21–32)
CREAT BLD-MCNC: 1.41 MG/DL
EGFRCR SERPLBLD CKD-EPI 2021: 53 ML/MIN/1.73M2 (ref 60–?)
EOSINOPHIL # BLD AUTO: 0 X10(3) UL (ref 0–0.7)
EOSINOPHIL NFR BLD AUTO: 0 %
ERYTHROCYTE [DISTWIDTH] IN BLOOD BY AUTOMATED COUNT: 16.8 %
GLUCOSE BLD-MCNC: 168 MG/DL (ref 70–99)
GLUCOSE BLD-MCNC: 176 MG/DL (ref 70–99)
GLUCOSE BLD-MCNC: 183 MG/DL (ref 70–99)
GLUCOSE BLD-MCNC: 201 MG/DL (ref 70–99)
GLUCOSE BLD-MCNC: 207 MG/DL (ref 70–99)
HCT VFR BLD AUTO: 31.7 %
HGB BLD-MCNC: 10.7 G/DL
IMM GRANULOCYTES # BLD AUTO: 0.02 X10(3) UL (ref 0–1)
IMM GRANULOCYTES NFR BLD: 0.4 %
LYMPHOCYTES # BLD AUTO: 0.07 X10(3) UL (ref 1–4)
LYMPHOCYTES NFR BLD AUTO: 1.3 %
MCH RBC QN AUTO: 31 PG (ref 26–34)
MCHC RBC AUTO-ENTMCNC: 33.8 G/DL (ref 31–37)
MCV RBC AUTO: 91.9 FL
MONOCYTES # BLD AUTO: 0.07 X10(3) UL (ref 0.1–1)
MONOCYTES NFR BLD AUTO: 1.3 %
NEUTROPHILS # BLD AUTO: 5.32 X10 (3) UL (ref 1.5–7.7)
NEUTROPHILS # BLD AUTO: 5.32 X10(3) UL (ref 1.5–7.7)
NEUTROPHILS NFR BLD AUTO: 97 %
OSMOLALITY SERPL CALC.SUM OF ELEC: 300 MOSM/KG (ref 275–295)
PLATELET # BLD AUTO: 63 10(3)UL (ref 150–450)
POTASSIUM SERPL-SCNC: 4.3 MMOL/L (ref 3.5–5.1)
POTASSIUM SERPL-SCNC: 4.3 MMOL/L (ref 3.5–5.1)
RBC # BLD AUTO: 3.45 X10(6)UL
SODIUM SERPL-SCNC: 140 MMOL/L (ref 136–145)
WBC # BLD AUTO: 5.5 X10(3) UL (ref 4–11)

## 2024-04-29 PROCEDURE — 74230 X-RAY XM SWLNG FUNCJ C+: CPT | Performed by: HOSPITALIST

## 2024-04-29 PROCEDURE — 92611 MOTION FLUOROSCOPY/SWALLOW: CPT

## 2024-04-29 PROCEDURE — 82962 GLUCOSE BLOOD TEST: CPT

## 2024-04-29 PROCEDURE — C9113 INJ PANTOPRAZOLE SODIUM, VIA: HCPCS | Performed by: STUDENT IN AN ORGANIZED HEALTH CARE EDUCATION/TRAINING PROGRAM

## 2024-04-29 PROCEDURE — 92526 ORAL FUNCTION THERAPY: CPT

## 2024-04-29 PROCEDURE — 80048 BASIC METABOLIC PNL TOTAL CA: CPT | Performed by: HOSPITALIST

## 2024-04-29 PROCEDURE — 85025 COMPLETE CBC W/AUTO DIFF WBC: CPT | Performed by: HOSPITALIST

## 2024-04-29 PROCEDURE — 84132 ASSAY OF SERUM POTASSIUM: CPT | Performed by: HOSPITALIST

## 2024-04-29 NOTE — PLAN OF CARE
Pt A+Ox4, slow and slurred speech baseline.  Denies pain.  Wife at bedside and discussed possible hospice with Dr. Valladares and hospice nurses, Marilyn RN, Jane RN.  Pt and wife to discuss options with Dr. Rivers in the morning.  K repleted.  Pt able to tolerate chicken broth and requested ice cream.  Pt aware that he is scheduled for video swallow test tomorrow.  Problem: METABOLIC/FLUID AND ELECTROLYTES - ADULT  Goal: Electrolytes maintained within normal limits  Description: INTERVENTIONS:  - Monitor labs and rhythm and assess patient for signs and symptoms of electrolyte imbalances  - Administer electrolyte replacement as ordered  - Monitor response to electrolyte replacements, including rhythm and repeat lab results as appropriate  - Fluid restriction as ordered  - Instruct patient on fluid and nutrition restrictions as appropriate  Outcome: Progressing     Problem: PAIN - ADULT  Goal: Verbalizes/displays adequate comfort level or patient's stated pain goal  Description: INTERVENTIONS:  - Encourage pt to monitor pain and request assistance  - Assess pain using appropriate pain scale  - Administer analgesics based on type and severity of pain and evaluate response  - Implement non-pharmacological measures as appropriate and evaluate response  - Consider cultural and social influences on pain and pain management  - Manage/alleviate anxiety  - Utilize distraction and/or relaxation techniques  - Monitor for opioid side effects  - Notify MD/LIP if interventions unsuccessful or patient reports new pain  - Anticipate increased pain with activity and pre-medicate as appropriate  Outcome: Progressing

## 2024-04-29 NOTE — HOSPICE RN NOTE
[facility] followed -up with spouse, Isabelle. She stated that they spoke with Dr. Rivers, and are wanting to talk with Dr. Henning and ELVIRA Alejandro prior to making a decision about hospice. [facility] will continue to follow to support the patient and family.    Vangie Schroeder RN  [facility] RN Liaison  [phone] (After-hours)

## 2024-04-29 NOTE — PROGRESS NOTES
.[facility] note    PCP: Deyanira Resendez MD    Chief Complaint:  F/u melena    SUBJECTIVE:  Hgb stable. Reports having a bm this morning. Had swallow eval and is hungry, going to try some mashed potatoes.     OBJECTIVE:  Temp:  [97.4 °F (36.3 °C)-98.5 °F (36.9 °C)] 97.5 °F (36.4 °C)  Pulse:  [68-80] 68  Resp:  [15-16] 16  BP: ()/(58-66) 113/66  SpO2:  [95 %-99 %] 98 %    Intake/Output:    Intake/Output Summary (Last 24 hours) at 4/29/2024 0904  Last data filed at 4/29/2024 0615  Gross per 24 hour   Intake 2120 ml   Output 850 ml   Net 1270 ml       Last 3 Weights   04/26/24 0020 178 lb (80.7 kg)   04/25/24 1814 170 lb (77.1 kg)   04/05/24 1428 195 lb (88.5 kg)   04/05/24 1200 195 lb (88.5 kg)   04/05/24 0902 195 lb (88.5 kg)   03/24/24 1700 193 lb 14.4 oz (88 kg)   03/24/24 1307 195 lb (88.5 kg)       Exam  Gen: No acute distress  HEENT: anicteric sclera, MMM  Pulm: Lungs clear, normal respiratory effort  CV: Heart with regular rate and rhythm, no peripheral edema  Abd: Abdomen soft, nontender, nondistended, no organomegaly, bowel sounds present  MSK: Full range of motion in extremities, no clubbing, no cyanosis  Skin: no rashes or lesions  Neuro: A&OX3, no focal deficits    Data Review:         Labs:     Recent Labs   Lab 04/25/24  1818 04/27/24  0516 04/27/24  0856 04/28/24  0745 04/29/24  0447   WBC 16.1* 7.5 9.1 7.2 5.5   HGB 14.3 11.2* 11.9* 10.5* 10.7*   MCV 89.1 89.9 97.4 95.4 91.9   .0 90.0* 67.0* 78.0* 63.0*   NE 15.04*  --  8.06* 6.78 5.32   LYMABS 0.21*  --  0.19* 0.11* 0.07*   INR 1.11  --   --   --   --        Recent Labs   Lab 04/25/24 1818 04/27/24  0516 04/28/24  0745 04/29/24  0447   * 135* 139 140   K 4.6 3.2* 3.4*  3.4* 4.3  4.3   CL 99 107 112 115*   CO2 20.0* 20.0* 19.0* 16.0*   BUN 64* 44* 37* 29*   CREATSERUM 1.38* 1.30 1.08 1.41*   CA 8.5 8.1* 7.9* 7.9*   MG  --  2.3  --   --    * 164* 168* 176*       Recent Labs   Lab 04/25/24 1818 04/27/24  0516   ALT 31 24    AST 38* 23   ALB 2.6* 2.0*       No results for input(s): \"TROP\", \"CK\", \"PBNP\", \"PCT\" in the last 168 hours.    No results for input(s): \"CRP\", \"KIRBY\", \"LDH\", \"DDIMER\" in the last 168 hours.    Recent Labs   Lab 04/28/24  0517 04/28/24  1150 04/28/24  1557 04/28/24  2046 04/29/24  0501   PGLU 165* 170* 175* 174* 168*       Meds:   Scheduled Medication:   dexamethasone  4 mg Intravenous Q8H    pantoprazole  40 mg Intravenous Q12H    insulin aspart  1-5 Units Subcutaneous TID AC and HS     Continuous Infusing Medication:   sodium chloride 75 mL/hr at 04/28/24 1052     PRN Medication:  heparin    ondansetron    metoclopramide    polyethylene glycol (PEG 3350)    sennosides    bisacodyl    fleet enema    glucose **OR** glucose **OR** glucose-vitamin C **OR** dextrose **OR** glucose **OR** glucose **OR** glucose-vitamin C       Microbiology:    Hospital Encounter on 04/25/24   1. 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      **melena, diarrhea in setting of   **recent EGD with ulcerated gastric cardia mass with clot and friable mucosa with 2 endoclips placed 3/25/24 per GI.   - egd 4/27 with friable mass again seen, not actively bleeding. GEJ stricture noted, await swallow eval tomorrow  -cont ppi  -ok for regular diet and thickened liquids per swallow eval  -on empiric abx zosyn, not sure there is still a clear indication for this so has been  stopped.      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   -CT with progression of metastasis, suspected splenic infarcts d/t tumor burden to vasculature  - decadron restarted  -Dr. Rivers saw pt from oncology and they were agreeable to home with hospice during that convo. [facility] following, family also may want to discuss further with Dr. Henning before finalizing plans    **anemia/thrombocytopenia  - hgb declined from 14.3 to 11.9, plts declined from 162 to 67 but counts have been variable over the past month with admissions related to bleeding from the gastric mass and with the metastatic neuroendocrine cancer.   - relatively stable on today's cbc     **incidental finding of L hydronephrosis d/t tumor burden  -creatinine slightly elevated. Urinating ok. UA contaminated.  -urology consult, appreciate. No plan for intervention at this time given discussion of above goals of care     **hyponatremia/bro- electrolytes had improved with IVF. Cr up today. Could be component of hydronephrosis as well. Will stop IVF for now as diet has been liberalized today. Will trend Cr while here.     Stable chronic illnesses:  HTN/HL, CM - hold po meds for now  DMII- iss, accuchecks  Gout     PPx-scds for now     Discussed with wife/pt/onc    Jennifer Valladares MD  [facility]  Pager: [phone]

## 2024-04-29 NOTE — HOSPICE RN NOTE
[facility] signed consents with spouse and patient. [facility] will work on discharge planning, for patient to go home tomorrow with . Will continue to follow patient and family for support. Will update accordingly.       Ambulance scheduled for 12pm tomorrow, all equipment ordered.    Vangie Schroeder RN  [facility] RN Liaison  [phone] (After-hours)

## 2024-04-29 NOTE — PROGRESS NOTES
[facility]  Hematology Oncology follow up note    Renato Hall Patient Status:  Inpatient    1950 MRN [id number]   Location [facility] 4NW-A Attending Jennifer Valladares MD   Hosp Day # 3 PCP Deyanira Resendez MD   Interval history-  Situation reviewed.  CBC noted, thrombocytopenia is worse.  Hemoglobin 10.7 grams.       I have communicated with Dr. De La Torre about his admission.      2024  Reason for Consultation:  Neuroendocrine tumor of stomach, melena    History of Present Illness:  Pt is a 74 yo male, pt of Dr. 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  Last office visit with Dr. Henning on 3/1, at that time he was responding to topotecan based on CT in 2024.  He was admitted to [facility] end of 2024 with GI bleed, Hgb 5's, EGD showed ulcerated cardia mass, s/p enodclips x 2.  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    Referred to Rad Onc as outpt, treated by Dr. Cristobal Duran, recently completed 9 of planned 10 fractions of WBRT on  and completed 8 planned fractions of palliative RT to stomach to control bleeding.  He missed his 10th fraction of brain RT on  due to being in ED.  Outpt CT on  for RT planning with progression of disease compared to , new liver mets, enlarging gastrohepatic node, new thickening of L adrenal gland, sclerotic lesions in bilateral iliac wings (new?) and L1 (seen prior).    Hgb 8's upon discharge in March, stable in 9's during readmission in April.  Pt presented to ED on  with melena, generalized weakness.  Initial Hgb here 14 --> 11's, stable at 10.5 today.  Pt not seen on  as he was down at EGD, which showed stricture at GEJ due to gastric mass, diffusely friable gastric mass encompassing majority of cardia, body, fundus, very friable but no active bleeding.  CT a/p here (compared to ) with progression of  disease, new liver mets, infiltrating malignant process in upper abd, extending to L retroperitoneal LAD, developing splenic infarcts with compromise of L splenic vein and splenic artery by tumor, L hydronephrosis, possibly 2ard to tumor implants around the proximal L ureter, sclerotic metastatic bone lesions.    History taken from pt and wife.  He has been generally weak during radiation, PS 2-3, at home sits up in recliner.      History:  Past Medical History:    Abnormal screening CT of heart    calcium score 6    Abnormal screening CT of heart    calcium score of 18    Abnormal screening CT of heart    calcium score 53    Adenomatous colon polyp    Allergic rhinitis due to pollen    BPH (benign prostatic hyperplasia)    Cancer (HCC)    Neuroendocrine CA/Pancreatic    Cardiomyopathy (HCC)    2D Echo 9/22/23    COVID    No hospitalization. Harsh coughing    Degeneration of cervical intervertebral disc    Diverticula of colon    Essential hypertension, benign    GOUT    High blood pressure    High cholesterol    History of blood transfusion    No reaction    Hypertrophic cardiomyopathy (HCC)    Personal history of antineoplastic chemotherapy    Last treatment    Pure hypercholesterolemia    Tinnitus, bilateral    Type II or unspecified type diabetes mellitus without mention of complication, not stated as uncontrolled    Visual impairment    Glasses     Past Surgical History:   Procedure Laterality Date    Cholecystectomy  06/21/1988    Colonoscopy  04/21/2008    tiny polyps    Colonoscopy  08/27/2014    Procedure: COLONOSCOPY;  Surgeon: Liang Quevedo MD;  Location:  ENDOSCOPY    Colonoscopy N/A 09/20/2017    Procedure: COLONOSCOPY;  Surgeon: Liang Quevedo MD;  Location:  ENDOSCOPY    Colonoscopy      Peripheral vascular screening historical conv Bilateral 05/22/2017    mild carotid narrowing, PAD screen    Upper gi endoscopy,exam      Vascular lab - dmg Bilateral 11/01/2011    PAD screening normal      Family History   Problem Relation Age of Onset    Arthritis Father         TKA    Hypertension Father     Diabetes Father     Obesity Father     Cancer Mother 70        colon, ?uterine    Genito-Urinary Disorder Mother         hysterectomy    Substance Abuse Son     Asthma Son     High Cholesterol Son     Gastro-Intestinal Disorder Brother         colon polyps, GERD    Hypertension Brother     Neurological Disorder Daughter         MS    Diabetes Daughter     Hypertension Sister     Diabetes Sister     Lipids Sister       reports that he quit smoking about 38 years ago. His smoking use included cigarettes. He started smoking about 55 years ago. He has a 17 pack-year smoking history. He has never used smokeless tobacco. He reports that he does not drink alcohol and does not use drugs.    Allergies:  Allergies   Allergen Reactions    Amlodipine SWELLING     Ankle swelling on amlodipine.       Medications:    Current Facility-Administered Medications:     dexamethasone (Decadron) 4 MG/ML injection 4 mg, 4 mg, Intravenous, Q8H    heparin (Porcine) 100 Units/mL lock flush 500 Units, 5 mL, Intravenous, PRN    sodium chloride 0.9% infusion, , Intravenous, Continuous    ondansetron (Zofran) 4 MG/2ML injection 4 mg, 4 mg, Intravenous, Q6H PRN    metoclopramide (Reglan) 5 mg/mL injection 10 mg, 10 mg, Intravenous, Q8H PRN    polyethylene glycol (PEG 3350) (Miralax) 17 g oral packet 17 g, 17 g, Oral, Daily PRN    sennosides (Senokot) tab 17.2 mg, 17.2 mg, Oral, Nightly PRN    bisacodyl (Dulcolax) 10 MG rectal suppository 10 mg, 10 mg, Rectal, Daily PRN    fleet enema (Fleet) 7-19 GM/118ML rectal enema 133 mL, 1 enema, Rectal, Once PRN    pantoprazole (Protonix) 40 mg in sodium chloride 0.9% PF 10 mL IV push, 40 mg, Intravenous, Q12H    glucose (Dex4) 15 GM/59ML oral liquid 15 g, 15 g, Oral, Q15 Min PRN **OR** glucose (Glutose) 40% oral gel 15 g, 15 g, Oral, Q15 Min PRN **OR** glucose-vitamin C (Dex-4) chewable tab 4  tablet, 4 tablet, Oral, Q15 Min PRN **OR** dextrose 50% injection 50 mL, 50 mL, Intravenous, Q15 Min PRN **OR** glucose (Dex4) 15 GM/59ML oral liquid 30 g, 30 g, Oral, Q15 Min PRN **OR** glucose (Glutose) 40% oral gel 30 g, 30 g, Oral, Q15 Min PRN **OR** glucose-vitamin C (Dex-4) chewable tab 8 tablet, 8 tablet, Oral, Q15 Min PRN    insulin aspart (NovoLOG) 100 Units/mL FlexPen 1-5 Units, 1-5 Units, Subcutaneous, TID AC and HS    Review of Systems:  A 10-point review of systems was done with pertinent positives and negatives per the HPI.    Physical Exam:   Blood pressure 121/70, pulse 75, temperature 96.6 °F (35.9 °C), temperature source Axillary, resp. rate 18, weight 178 lb (80.7 kg), SpO2 97%.  General: Patient is weak appearing, alert and oriented x 3, not in acute distress.  HEENT: EOMs intact. PERRL. Oropharynx is clear.   Neck: No palpable lymphadenopathy. Neck is supple.  Chest: Clear to auscultation.  Heart: Regular rate and rhythm.   Abdomen: Soft, non tender with good bowel sounds.  Extremities: Pedal pulses are present. No edema.  Neurological: Grossly intact.   Lymphatics: There is no palpable lymphadenopathy throughout in the cervical or supraclavicular, regions.  ECOG PS: 2-3    Laboratory Data:    Lab Results   Component Value Date    WBC 5.5 04/29/2024    HGB 10.7 04/29/2024    HCT 31.7 04/29/2024    PLT 63.0 04/29/2024    CREATSERUM 1.41 04/29/2024    BUN 29 04/29/2024     04/29/2024    K 4.3 04/29/2024    K 4.3 04/29/2024     04/29/2024    CO2 16.0 04/29/2024     04/29/2024    CA 7.9 04/29/2024    PGLU 183 04/29/2024       Recent Labs   Lab 04/27/24  0856 04/28/24  0745 04/29/24  0447   RBC 3.83 3.45* 3.45*   HGB 11.9* 10.5* 10.7*   HCT 37.3* 32.9* 31.7*   MCV 97.4 95.4 91.9   MCH 31.1 30.4 31.0   MCHC 31.9 31.9 33.8   RDW 16.8 16.6 16.8   NEPRELIM 8.06* 6.78 5.32   WBC 9.1 7.2 5.5   PLT 67.0* 78.0* 63.0*         Recent Labs   Lab 04/25/24  1818 04/27/24  0516 04/28/24  0745  04/29/24  0447   * 164* 168* 176*   BUN 64* 44* 37* 29*   CREATSERUM 1.38* 1.30 1.08 1.41*   EGFRCR 54* 58* 72 53*   CA 8.5 8.1* 7.9* 7.9*   ALB 2.6* 2.0*  --   --    * 135* 139 140   K 4.6 3.2* 3.4*  3.4* 4.3  4.3   CL 99 107 112 115*   CO2 20.0* 20.0* 19.0* 16.0*   ALKPHO 107 86  --   --    AST 38* 23  --   --    ALT 31 24  --   --    BILT 0.9 0.6  --   --    TP 5.7* 4.4*  --   --        Imaging:  reviewed    Impression and Plan:    Metastatic poorly differentiated neuroendocrine tumor of stomach  Progression of disease on 2nd line topotecan  Intracranial and leptomeningeal mets, s/p WBRT, completed 4/25  GI bleed from tumor in March 2024, s/p palliative RT to gastric tumor  Remains on Dex 4 q 8 hours currently.    Discussion held with pt and his wife again today  He understands he is too weak for chemotherapy and does not want further procedures.      Will confirm outpt dose of steroids he was on for brain mets (wife states he was on 4 mg q8 and has not been instructed to taper yet) and resume this as inpt.   Dr. De La Torre has sent query to the rad onc team as to this today.  Thrombocytopenia - not due to chemo as last treatment over 1 month ago and plts had recovered.  May be consumptive due to GI bleed.  Currently no active bleeding, no need for PRBC or plts transfusion today.  Check CBC daily.   Platelet count is down to 60s.   Hospice RN notes reviewed.   Given his PS and recent events, feel that this is his best option.  > 45 mins discussion with dtr, wife, patient.    He wishes to try to go home on Hospice as soon as possible.   He wishes to communicate with Dr. Henning and our NN Flavia as to his situation as well and was encouraged to do so.    Andrew Rivers MD.  [facility]  Department of Oncology and Hematology

## 2024-04-29 NOTE — PLAN OF CARE
Pt received A&Ox4. Slurred speech at baseline. VSS. Afebrile. Denies pain. All meds given per MAR. Frequently repositioned through night. Video swallow scheduled for today. Call light within reach. Fall precautions in place.    Problem: PAIN - ADULT  Goal: Verbalizes/displays adequate comfort level or patient's stated pain goal  Description: INTERVENTIONS:  - Encourage pt to monitor pain and request assistance  - Assess pain using appropriate pain scale  - Administer analgesics based on type and severity of pain and evaluate response  - Implement non-pharmacological measures as appropriate and evaluate response  - Consider cultural and social influences on pain and pain management  - Manage/alleviate anxiety  - Utilize distraction and/or relaxation techniques  - Monitor for opioid side effects  - Notify MD/LIP if interventions unsuccessful or patient reports new pain  - Anticipate increased pain with activity and pre-medicate as appropriate  Outcome: Progressing

## 2024-04-29 NOTE — PLAN OF CARE
Pt Aox4. VSS. Afebrile   No complain of pain N/V/D or constipation.  Resting in bed.  Family at the bedside.  Poor appetite.  Swallow study completed. Initiated nectar thick, soft diet per recommendations.  Will continue plan of care.           Problem: GASTROINTESTINAL - ADULT  Goal: Maintains or returns to baseline bowel function  Description: INTERVENTIONS:  - Assess bowel function  - Maintain adequate hydration with IV or PO as ordered and tolerated  - Evaluate effectiveness of GI medications  - Encourage mobilization and activity  - Obtain nutritional consult as needed  - Establish a toileting routine/schedule  - Consider collaborating with pharmacy to review patient's medication profile  Outcome: Progressing     Problem: METABOLIC/FLUID AND ELECTROLYTES - ADULT  Goal: Glucose maintained within prescribed range  Description: INTERVENTIONS:  - Monitor Blood Glucose as ordered  - Assess for signs and symptoms of hyperglycemia and hypoglycemia  - Administer ordered medications to maintain glucose within target range  - Assess barriers to adequate nutritional intake and initiate nutrition consult as needed  - Instruct patient on self management of diabetes  Outcome: Progressing  Goal: Electrolytes maintained within normal limits  Description: INTERVENTIONS:  - Monitor labs and rhythm and assess patient for signs and symptoms of electrolyte imbalances  - Administer electrolyte replacement as ordered  - Monitor response to electrolyte replacements, including rhythm and repeat lab results as appropriate  - Fluid restriction as ordered  - Instruct patient on fluid and nutrition restrictions as appropriate  Outcome: Progressing  Goal: Hemodynamic stability and optimal renal function maintained  Description: INTERVENTIONS:  - Monitor labs and assess for signs and symptoms of volume excess or deficit  - Monitor intake, output and patient weight  - Monitor urine specific gravity, serum osmolarity and serum sodium as  indicated or ordered  - Monitor response to interventions for patient's volume status, including labs, urine output, blood pressure (other measures as available)  - Encourage oral intake as appropriate  - Instruct patient on fluid and nutrition restrictions as appropriate  Outcome: Progressing     Problem: HEMATOLOGIC - ADULT  Goal: Maintains hematologic stability  Description: INTERVENTIONS  - Assess for signs and symptoms of bleeding or hemorrhage  - Monitor labs and vital signs for trends  - Administer supportive blood products/factors, fluids and medications as ordered and appropriate  - Administer supportive blood products/factors as ordered and appropriate  Outcome: Progressing  Goal: Free from bleeding injury  Description: (Example usage: patient with low platelets)  INTERVENTIONS:  - Avoid intramuscular injections, enemas and rectal medication administration  - Ensure safe mobilization of patient  - Hold pressure on venipuncture sites to achieve adequate hemostasis  - Assess for signs and symptoms of internal bleeding  - Monitor lab trends  - Patient is to report abnormal signs of bleeding to staff  - Avoid use of toothpicks and dental floss  - Use electric shaver for shaving  - Use soft bristle tooth brush  - Limit straining and forceful nose blowing  Outcome: Progressing

## 2024-04-29 NOTE — DIETARY NOTE
[facility]   part of [facility]    NUTRITION ASSESSMENT    Pt meets severe malnutrition criteria at this time.    CRITERIA FOR MALNUTRITION DIAGNOSIS:  Criteria for severe malnutrition diagnosis: chronic illness related to wt loss greater than 7.5% in 3 months, energy intake less than 75% for greater than 1 month, body fat severe depletion, and muscle mass severe depletion      NUTRITION INTERVENTION:    RD nutrition Care Plan- See RD nutrition assessment for additional recommendations  Meal and Snacks - Monitor and encourage adequate PO intake.   Medical Food Supplements - Will evaluate need when diet is advanced. Rationale/use for oral supplements discussed.  Coordination of Nutrition Care - SLP consult prior to diet advancement.      PATIENT STATUS:   4/29- pt to begin hospice and plan to discharge to home tomorrow on hospice.  RD continues to be available if needed.   04/26/24 73 year old male admitted with GIB and recently diagnosed with metastatic neuroendocrine tumor. Pt and wife report he is not able to eat or drink much more than pudding and italian ice due to trouble swallowing. Pt met with speech today and they plan to do VFSS .  Pt reports trouble swallow began 1 month ago but has worsened over past week where he is able to take in very little.  Offered ONS but pt does not want at this time , he would like to wait until after the swallow study.  Pt with significant wt loss over the past few months and pt with severe muscle and fat depletion.        ANTHROPOMETRICS:  Ht:  172.7 cm (5'8\")  Wt: 80.7 kg (178 lb).   BMI: Body mass index is 27.88 kg/m².  IBW: 70 kg      WEIGHT HISTORY:   Weight loss: Yes, Severe Wt loss of 15 kg, 16%, over 4 months     Wt Readings from Last 10 Encounters:   04/26/24 80.7 kg (178 lb)   04/05/24 88.5 kg (195 lb)   03/24/24 88 kg (193 lb 14.4 oz)   02/23/24 90.7 kg (200 lb)   12/21/23 95.7 kg (211 lb)   04/07/23 96.6 kg (213 lb)   02/27/23 105.2 kg (232 lb)   03/14/22  117 kg (258 lb)   12/13/21 114.8 kg (253 lb)   09/13/21 110.2 kg (243 lb)        NUTRITION:  Diet:       Procedures    Regular/General diet Fluid Consistency: Nectar Thick / Mildly Thick Liquids; Is Patient on Accuchecks? Yes; Misc Restriction: Low Fiber/Soft      Food Allergies: No  Cultural/Ethnic/Yazdanism Preferences Addressed: Yes    Percent Meals Eaten (last 3 days)       Date/Time Percent Meals Eaten (%)    04/26/24 1300 0 %    04/27/24 1002 0 %    04/29/24 1300 80 %            GI system review: swallowing dysfunction Last BM: PTA  Skin and wounds: none    NUTRITION RELATED PHYSICAL FINDINGS:     1. Body Fat/Muscle Mass: severe depletion body fat Orbital fat pad and Triceps and severe muscle depletion Temple region, Clavicle region, and Shoulder/Acromion process     2. Fluid Accumulation: none     NUTRITION PRESCRIPTION: 80.7kg (4/26)  Calories: 2933-2256 calories/day (25-30 kcal/kg)  Protein:  grams protein/day (1.2-1.5 grams protein per kg)  Fluid: ~1 ml/kcal or per MD discretion    NUTRITION DIAGNOSIS/PROBLEM:  Malnutrition related to physiological causes and inability to take or tolerate as evidenced by documented/reported insufficient oral intake, documented/reported unintentional weight loss, loss of fat mass, and loss of muscle mass      MONITOR AND EVALUATE/NUTRITION GOALS:  RD to sign off as pt to be discharged to home on hospice      MEDICATIONS:  Reviewed    LABS:  Sodium 140, BUN 29, Creatine 1.41    Pt is at High nutrition risk  Ching Sanders RD, LDN  Clinical Nutrition

## 2024-04-29 NOTE — VIDEO SWALLOW STUDY NOTE
ADULT VIDEOFLUOROSCOPIC SWALLOWING STUDY    Admission Date: 4/25/2024  Evaluation Date: 04/29/24  Radiologist: Madhuri    RECOMMENDATIONS   Diet Recommendations - Solids: Regular  Diet Recommendations - Liquids: Nectar thick liquids/ Mildly thick    Further Follow-up:  Follow Up Needed (Documentation Required): Yes  SLP Follow-up Date: 04/30/24  Compensatory Strategies Recommended: Small bites and sips  Aspiration Precautions: Upright position  Medication Administration Recommendations: One pill at a time (w/ puree prn)  Treatment Plan/Recommendations: Dysphagia therapy    HISTORY   Background/Objective Information: Patient is a 72 y/o male known to this service for prior dysphagia assessment. Patient reported frequent coughing/choking with drinking prior to hospitalization. No overt s/s of aspiration observed at bedside, but VFSS recommended given patient's c/o frequent s/s of aspiration at baseline.    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 Harsh coughing    Degeneration of cervical intervertebral disc    Diverticula of colon    Essential hypertension, benign    GOUT    High blood pressure    High cholesterol    History of blood transfusion    No reaction    Hypertrophic cardiomyopathy (HCC)    Personal history of antineoplastic chemotherapy    Last treatment    Pure hypercholesterolemia    Tinnitus, bilateral    Type II or unspecified type diabetes  mellitus without mention of complication, not stated as uncontrolled    Visual impairment    Glasses       Current Diet Consistency: Regular;Thin liquids  Prior Level of Function: Assistance/Support for ADL's  Prior Living Situation: Home with spouse  History of Recent: No recent respiratory difficulty  Precautions: Aspiration  Imaging results:   CT A+P 4/25/24  CONCLUSION:       1. Progression of malignancy, now with multiple hepatic metastatic lesions, and and infiltrating spreading malignant appearing process in the upper abdomen including gastroduodenal region and pancreas, with stranding and nodularity in the upper abdomen,   extending downward along the left retroperitoneum para renal fascia and into the para-aortic and aortocaval retroperitoneum where there is lymphadenopathy.      2. Developing splenic infarcts, with compromise of the left splenic vein and splenic artery by tumor.      3. Left hydronephrosis, probably secondary to tumor implants around the proximal left ureter, with demonstration of enhancing soft tissue in this region, and dilation of the left ureter proximal to the lesion.      4. Small amount of likely malignant free fluid in the upper abdomen and trace amount of pelvis, but no large or drainable ascites.      5. Sclerotic metastatic bone lesions.       6. . No sign of bowel obstruction, or free air.  Small pleural effusions.  Small pericardial effusion.  Other findings as above.         LOCATION:  [facility]         Dictated by (CST): Handy Dhaliwal MD on 4/25/2024 at 10:18 PM       Finalized by (CST): Handy Dhaliwal MD on 4/25/2024 at 10:31 PM     Reason for Referral: R/O aspiration    Family/Patient Goals: none stated     ASSESSMENT   DYSPHAGIA ASSESSMENT  Test completed in conjunction with Radiologist.  Patient Positioned: Upright;Midline.  Patient Viewed: Lateral.  Patient Alertness: Fully alert.  Consistencies Presented: Thin liquids;Nectar thick liquids/ Mildly thick;Puree;Hard solid  to assess oropharyngeal swallow function and assess for compensatory strategies to improve safe swallow function.    THIN LIQUIDS  Method of Presentation: Cup;Straw  Oral Phase of Swallow (VFSS - Thin Liquids): Impaired  Bolus Propulsion (VFSS - Thin Liquids): Impaired  Triggered at: Pyriform sinuses  Laryngeal Penetration: Before the swallow  Tracheal Aspiration: Before the swallow  Cough/Throat Clear Response: Yes  Cough/Throat Clear Effective: Partially  NECTAR THICK LIQUIDS/ MILDLY THICK  Method of Presentation: Cup;Straw  Oral Phase of Swallow (VFSS - Nectar Thick Liquids): Impaired  Bolus Propulsion (VFSS - Nectar Thick Liquids): Impaired  Triggered at: Valleculae  Residue Severity, Location: Mild;Valleculae  Laryngeal Penetration: Transient  Tracheal Aspiration: None     PUREE  Oral Phase of Swallow (VFSS - Puree): Impaired  Bolus Propulsion (VFSS - Puree): Impaired  Triggered at: Base of tongue  Residue Severity, Location: Mild/Mod;Valleculae  Cleared/Reduced with: Secondary swallow  Laryngeal Penetration: None  Tracheal Aspiration: None     HARD SOLID  Oral Phase of Swallow (VFSS - Hard Solid): Impaired  Bolus Propulsion (VFSS - Hard Solid): Impaired  Triggered at: Valleculae  Residue Severity, Location: Mild/Mod;Valleculae  Cleared/Reduced with: Secondary swallow  Laryngeal Penetration: None  Tracheal Aspiration: None  Penetration Aspiration Scale Score: Score 6: Material enters the airway, passes below the vocal folds, and is ejected into the larynx or out of the airway       Overall Impression:   Patient presented with oropharyngeal dysphagia characterized by impaired AP bolus transit, delayed pharyngeal swallow initiation and reduced base of tongue retraction. Bolus acceptance was adequate without evidence of anterior bolus loss. AP bolus transit was prolonged and uncoordinated. Pharyngeal swallow initiated at the level of the pyriform sinuses with thin liquids. This resulted in deep penetration, and  one instance of trace aspiration, before the swallow. Patient with immediate reflexive throat clear following aspiration. Timing of pharyngeal swallow improved with mildly thick liquids. Flash penetration observed, but cleared with cessation of the swallow. Mild to moderate vallecular residue was cleared with secondary swallow.     Patient appears safe to initiate a regular diet and mildly thick liquids. Bolus size and rate of intake should be limited. SLP will continue to follow to monitor diet tolerance and initiate trial of dysphagia therapy. Education provided re: results and recommendations. Discussed rationale for trial of mildly thick liquids to reduce symptoms of aspiration with PO intake. Patient verbalized understanding.               GOALS  Goal #1 The patient will tolerate regular consistency and thin liquids without overt signs or symptoms of aspiration with 95 % accuracy over 1-2 session(s).  In Progress   Goal #2 The patient/family/caregiver will demonstrate understanding and implementation of aspiration precautions and swallow strategies independently over 1-2 session(s).     In Progress   Goal #3 VFSS if agreeable  Met   Goal #4  Patient will complete pharyngeal strengthening exercises with 80% accuracy when provided mild cues within 3 visits.  In Progress   Goal #5       Goal #6       Goal #7       Goal #8          EDUCATION/INSTRUCTION  Reviewed results and recommendations with patient/family/caregiver.  Agreement/Understanding verbalized and all questions answered to their apparent satisfaction.    INTERDISCIPLINARY COMMUNICATION  Reviewed results with Radiologist; agreement verbalized.    Patient Experiencing Pain: No                FOLLOW UP  Treatment Plan/Recommendations: Dysphagia therapy       Thank you for your referral.   If you have any questions, please contact JENNIFER Benitez

## 2024-04-30 VITALS
OXYGEN SATURATION: 96 % | SYSTOLIC BLOOD PRESSURE: 122 MMHG | BODY MASS INDEX: 28 KG/M2 | TEMPERATURE: 98 F | RESPIRATION RATE: 18 BRPM | DIASTOLIC BLOOD PRESSURE: 80 MMHG | HEART RATE: 83 BPM | WEIGHT: 178.13 LBS

## 2024-04-30 LAB
ANION GAP SERPL CALC-SCNC: 8 MMOL/L (ref 0–18)
BASOPHILS # BLD AUTO: 0.01 X10(3) UL (ref 0–0.2)
BASOPHILS NFR BLD AUTO: 0.1 %
BUN BLD-MCNC: 33 MG/DL (ref 9–23)
CALCIUM BLD-MCNC: 8.4 MG/DL (ref 8.5–10.1)
CHLORIDE SERPL-SCNC: 114 MMOL/L (ref 98–112)
CO2 SERPL-SCNC: 17 MMOL/L (ref 21–32)
CREAT BLD-MCNC: 1.1 MG/DL
EGFRCR SERPLBLD CKD-EPI 2021: 71 ML/MIN/1.73M2 (ref 60–?)
EOSINOPHIL # BLD AUTO: 0 X10(3) UL (ref 0–0.7)
EOSINOPHIL NFR BLD AUTO: 0 %
ERYTHROCYTE [DISTWIDTH] IN BLOOD BY AUTOMATED COUNT: 16.6 %
GLUCOSE BLD-MCNC: 176 MG/DL (ref 70–99)
GLUCOSE BLD-MCNC: 184 MG/DL (ref 70–99)
HCT VFR BLD AUTO: 35.4 %
HGB BLD-MCNC: 11.4 G/DL
IMM GRANULOCYTES # BLD AUTO: 0.05 X10(3) UL (ref 0–1)
IMM GRANULOCYTES NFR BLD: 0.5 %
LYMPHOCYTES # BLD AUTO: 0.1 X10(3) UL (ref 1–4)
LYMPHOCYTES NFR BLD AUTO: 1 %
MCH RBC QN AUTO: 30.6 PG (ref 26–34)
MCHC RBC AUTO-ENTMCNC: 32.2 G/DL (ref 31–37)
MCV RBC AUTO: 94.9 FL
MONOCYTES # BLD AUTO: 0.2 X10(3) UL (ref 0.1–1)
MONOCYTES NFR BLD AUTO: 2 %
NEUTROPHILS # BLD AUTO: 9.81 X10 (3) UL (ref 1.5–7.7)
NEUTROPHILS # BLD AUTO: 9.81 X10(3) UL (ref 1.5–7.7)
NEUTROPHILS NFR BLD AUTO: 96.4 %
OSMOLALITY SERPL CALC.SUM OF ELEC: 300 MOSM/KG (ref 275–295)
PLATELET # BLD AUTO: 67 10(3)UL (ref 150–450)
POTASSIUM SERPL-SCNC: 4.3 MMOL/L (ref 3.5–5.1)
RBC # BLD AUTO: 3.73 X10(6)UL
SODIUM SERPL-SCNC: 139 MMOL/L (ref 136–145)
WBC # BLD AUTO: 10.2 X10(3) UL (ref 4–11)

## 2024-04-30 PROCEDURE — 80048 BASIC METABOLIC PNL TOTAL CA: CPT | Performed by: HOSPITALIST

## 2024-04-30 PROCEDURE — 82962 GLUCOSE BLOOD TEST: CPT

## 2024-04-30 PROCEDURE — C9113 INJ PANTOPRAZOLE SODIUM, VIA: HCPCS | Performed by: STUDENT IN AN ORGANIZED HEALTH CARE EDUCATION/TRAINING PROGRAM

## 2024-04-30 PROCEDURE — 85025 COMPLETE CBC W/AUTO DIFF WBC: CPT | Performed by: HOSPITALIST

## 2024-04-30 RX ORDER — DEXAMETHASONE 4 MG/1
4 TABLET ORAL EVERY 8 HOURS
Qty: 30 TABLET | Refills: 0 | Status: SHIPPED | OUTPATIENT
Start: 2024-04-30

## 2024-04-30 NOTE — HOSPICE RN NOTE
Patient to DC home today with hospice. All hospice DC plans in place. Ambulance  at noon today. Please call [facility] with any questions or concern.    Geovanna Pollard RN, PN  [facility] Liaison  [phone] after hours

## 2024-04-30 NOTE — PLAN OF CARE
Received pt alert and oriented x4. VSS on RA. Afebrile. No complaints of pain. Medications given per MAR. Fall and safety precautions in place. Call light within reach.     Problem: HEMATOLOGIC - ADULT  Goal: Free from bleeding injury  Description: (Example usage: patient with low platelets)  INTERVENTIONS:  - Avoid intramuscular injections, enemas and rectal medication administration  - Ensure safe mobilization of patient  - Hold pressure on venipuncture sites to achieve adequate hemostasis  - Assess for signs and symptoms of internal bleeding  - Monitor lab trends  - Patient is to report abnormal signs of bleeding to staff  - Avoid use of toothpicks and dental floss  - Use electric shaver for shaving  - Use soft bristle tooth brush  - Limit straining and forceful nose blowing  Outcome: Progressing     Problem: PAIN - ADULT  Goal: Verbalizes/displays adequate comfort level or patient's stated pain goal  Description: INTERVENTIONS:  - Encourage pt to monitor pain and request assistance  - Assess pain using appropriate pain scale  - Administer analgesics based on type and severity of pain and evaluate response  - Implement non-pharmacological measures as appropriate and evaluate response  - Consider cultural and social influences on pain and pain management  - Manage/alleviate anxiety  - Utilize distraction and/or relaxation techniques  - Monitor for opioid side effects  - Notify MD/LIP if interventions unsuccessful or patient reports new pain  - Anticipate increased pain with activity and pre-medicate as appropriate  Outcome: Progressing

## 2024-04-30 NOTE — PROGRESS NOTES
[facility]  Hematology Oncology follow up note    Renato Hall Patient Status:  Inpatient    1950 MRN [id number]   Location [facility] 4NW-A Attending Jennifer Valladares MD   Hosp Day # 4 PCP Deyanira Resendez MD   Interval history-  Situation reviewed with Renato again this morning.  He feels about the same.  Less bilateral hand swelling.    He wishes to go home.  No rash, bleeding, fevers, chills.       2024  Reason for Consultation:  Neuroendocrine tumor of stomach, melena    History of Present Illness:  Pt is a 74 yo male, pt of Dr. 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  Last office visit with Dr. Henning on 3/1, at that time he was responding to topotecan based on CT in 2024.  He was admitted to [facility] end of 2024 with GI bleed, Hgb 5's, EGD showed ulcerated cardia mass, s/p enodclips x 2.  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    Referred to Rad Onc as outpt, treated by Dr. Cristobal Duran, recently completed 9 of planned 10 fractions of WBRT on  and completed 8 planned fractions of palliative RT to stomach to control bleeding.  He missed his 10th fraction of brain RT on  due to being in ED.  Outpt CT on  for RT planning with progression of disease compared to , new liver mets, enlarging gastrohepatic node, new thickening of L adrenal gland, sclerotic lesions in bilateral iliac wings (new?) and L1 (seen prior).    Hgb 8's upon discharge in March, stable in 9's during readmission in April.  Pt presented to ED on  with melena, generalized weakness.  Initial Hgb here 14 --> 11's, stable at 10.5 today.  Pt not seen on  as he was down at EGD, which showed stricture at GEJ due to gastric mass, diffusely friable gastric mass encompassing majority of cardia, body, fundus, very friable but no active bleeding.  CT a/p here (compared to  2023) with progression of disease, new liver mets, infiltrating malignant process in upper abd, extending to L retroperitoneal LAD, developing splenic infarcts with compromise of L splenic vein and splenic artery by tumor, L hydronephrosis, possibly 2ard to tumor implants around the proximal L ureter, sclerotic metastatic bone lesions.    History taken from pt and wife.  He has been generally weak during radiation, PS 2-3, at home sits up in recliner.      History:  Past Medical History:    Abnormal screening CT of heart    calcium score 6    Abnormal screening CT of heart    calcium score of 18    Abnormal screening CT of heart    calcium score 53    Adenomatous colon polyp    Allergic rhinitis due to pollen    BPH (benign prostatic hyperplasia)    Cancer (HCC)    Neuroendocrine CA/Pancreatic    Cardiomyopathy (HCC)    2D Echo 9/22/23    COVID    No hospitalization. Harsh coughing    Degeneration of cervical intervertebral disc    Diverticula of colon    Essential hypertension, benign    GOUT    High blood pressure    High cholesterol    History of blood transfusion    No reaction    Hypertrophic cardiomyopathy (HCC)    Personal history of antineoplastic chemotherapy    Last treatment    Pure hypercholesterolemia    Tinnitus, bilateral    Type II or unspecified type diabetes mellitus without mention of complication, not stated as uncontrolled    Visual impairment    Glasses     Past Surgical History:   Procedure Laterality Date    Cholecystectomy  06/21/1988    Colonoscopy  04/21/2008    tiny polyps    Colonoscopy  08/27/2014    Procedure: COLONOSCOPY;  Surgeon: Liang Quevedo MD;  Location:  END[facility]COPY    Colonoscopy N/A 09/20/2017    Procedure: COLONOSCOPY;  Surgeon: Liang Quevedo MD;  Location:  ENDOSCOPY    Colonoscopy      Peripheral vascular screening historical conv Bilateral 05/22/2017    mild carotid narrowing, PAD screen    Upper gi endoscopy,exam      Vascular lab - dmg Bilateral 11/01/2011     PAD screening normal     Family History   Problem Relation Age of Onset    Arthritis Father         TKA    Hypertension Father     Diabetes Father     Obesity Father     Cancer Mother 70        colon, ?uterine    Genito-Urinary Disorder Mother         hysterectomy    Substance Abuse Son     Asthma Son     High Cholesterol Son     Gastro-Intestinal Disorder Brother         colon polyps, GERD    Hypertension Brother     Neurological Disorder Daughter         MS    Diabetes Daughter     Hypertension Sister     Diabetes Sister     Lipids Sister       reports that he quit smoking about 38 years ago. His smoking use included cigarettes. He started smoking about 55 years ago. He has a 17 pack-year smoking history. He has never used smokeless tobacco. He reports that he does not drink alcohol and does not use drugs.    Allergies:  Allergies   Allergen Reactions    Amlodipine SWELLING     Ankle swelling on amlodipine.     Medications:    Current Facility-Administered Medications:     dexamethasone (Decadron) 4 MG/ML injection 4 mg, 4 mg, Intravenous, Q8H    heparin (Porcine) 100 Units/mL lock flush 500 Units, 5 mL, Intravenous, PRN    ondansetron (Zofran) 4 MG/2ML injection 4 mg, 4 mg, Intravenous, Q6H PRN    metoclopramide (Reglan) 5 mg/mL injection 10 mg, 10 mg, Intravenous, Q8H PRN    polyethylene glycol (PEG 3350) (Miralax) 17 g oral packet 17 g, 17 g, Oral, Daily PRN    sennosides (Senokot) tab 17.2 mg, 17.2 mg, Oral, Nightly PRN    bisacodyl (Dulcolax) 10 MG rectal suppository 10 mg, 10 mg, Rectal, Daily PRN    fleet enema (Fleet) 7-19 GM/118ML rectal enema 133 mL, 1 enema, Rectal, Once PRN    pantoprazole (Protonix) 40 mg in sodium chloride 0.9% PF 10 mL IV push, 40 mg, Intravenous, Q12H    glucose (Dex4) 15 GM/59ML oral liquid 15 g, 15 g, Oral, Q15 Min PRN **OR** glucose (Glutose) 40% oral gel 15 g, 15 g, Oral, Q15 Min PRN **OR** glucose-vitamin C (Dex-4) chewable tab 4 tablet, 4 tablet, Oral, Q15 Min PRN **OR**  dextrose 50% injection 50 mL, 50 mL, Intravenous, Q15 Min PRN **OR** glucose (Dex4) 15 GM/59ML oral liquid 30 g, 30 g, Oral, Q15 Min PRN **OR** glucose (Glutose) 40% oral gel 30 g, 30 g, Oral, Q15 Min PRN **OR** glucose-vitamin C (Dex-4) chewable tab 8 tablet, 8 tablet, Oral, Q15 Min PRN    insulin aspart (NovoLOG) 100 Units/mL FlexPen 1-5 Units, 1-5 Units, Subcutaneous, TID AC and HS    Review of Systems:  A 10-point review of systems was done with pertinent positives and negatives per the HPI.    Physical Exam:   Blood pressure 128/67, pulse 73, temperature 97.5 °F (36.4 °C), temperature source Axillary, resp. rate 18, weight 178 lb (80.7 kg), SpO2 96%.  General: Patient is weak appearing, alert and oriented x 3, not in acute distress.  HEENT: EOMs intact. PERRL. Oropharynx is clear.   Neck: No palpable lymphadenopathy. Neck is supple.  Chest: Clear to auscultation.  Heart: Regular rate and rhythm.   Abdomen: Soft, non tender with good bowel sounds.  Extremities: Pedal pulses are present. No edema.  Neurological: Grossly intact.   Lymphatics: There is no palpable lymphadenopathy throughout in the cervical or supraclavicular, regions.  ECOG PS: 2-3    Laboratory Data:    Lab Results   Component Value Date    WBC 10.2 04/30/2024    HGB 11.4 04/30/2024    HCT 35.4 04/30/2024    PLT 67.0 04/30/2024    CREATSERUM 1.10 04/30/2024    BUN 33 04/30/2024     04/30/2024    K 4.3 04/30/2024     04/30/2024    CO2 17.0 04/30/2024     04/30/2024    CA 8.4 04/30/2024    PGLU 176 04/30/2024       Recent Labs   Lab 04/28/24  0745 04/29/24  0447 04/30/24  0542   RBC 3.45* 3.45* 3.73*   HGB 10.5* 10.7* 11.4*   HCT 32.9* 31.7* 35.4*   MCV 95.4 91.9 94.9   MCH 30.4 31.0 30.6   MCHC 31.9 33.8 32.2   RDW 16.6 16.8 16.6   NEPRELIM 6.78 5.32 9.81*   WBC 7.2 5.5 10.2   PLT 78.0* 63.0* 67.0*         Recent Labs   Lab 04/25/24  1818 04/27/24  0516 04/28/24  0745 04/29/24  0447 04/30/24  0542   * 164* 168* 176* 184*    BUN 64* 44* 37* 29* 33*   CREATSERUM 1.38* 1.30 1.08 1.41* 1.10   EGFRCR 54* 58* 72 53* 71   CA 8.5 8.1* 7.9* 7.9* 8.4*   ALB 2.6* 2.0*  --   --   --    * 135* 139 140 139   K 4.6 3.2* 3.4*  3.4* 4.3  4.3 4.3   CL 99 107 112 115* 114*   CO2 20.0* 20.0* 19.0* 16.0* 17.0*   ALKPHO 107 86  --   --   --    AST 38* 23  --   --   --    ALT 31 24  --   --   --    BILT 0.9 0.6  --   --   --    TP 5.7* 4.4*  --   --   --        Imaging:  XR VIDEO SWALLOW (CPT=74230)    Result Date: 4/29/2024  PROCEDURE:  XR VIDEO SWALLOW (CPT=74230)  TECHNIQUE:  Video fluoroscopic swallowing study was performed in cooperation with the speech pathologist.  Barium of varying consistencies was administered orally with patient in lateral projection.  COMPARISON:  None.  INDICATIONS:  Frequent coughing/choking with thin liquid intake at home x1 month  PATIENT STATED HISTORY: (As transcribed by Technologist)  Patient offered no additional information at this time.   CINE CAPTURES:  7 FLUORSCOPY TIME:  1 minute 43 seconds RADIATION DOSE (AIR KERMA PRODUCT):  43.4 uGy/m2   FINDINGS:  PHARYNGEAL PHASE:  Normal for age. ASPIRATION:  Trace. PENETRATION:  Positive, multiple episodes of penetration seen. OTHER:  Negative.  PLEASE SEE SPEECH PATHOLOGIST REPORT FOR FULL ASSESSMENT OF SWALLOWING MECHANISM.   LOCATION:  [facility]    Dictated by (CST): Handy Dhaliwal MD on 4/29/2024 at 9:13 AM     Finalized by (CST): Handy Dhaliwal MD on 4/29/2024 at 9:13 AM       CT ABDOMEN+PELVIS(CONTRAST ONLY)(CPT=74177)    Result Date: 4/25/2024  CONCLUSION:   1. Progression of malignancy, now with multiple hepatic metastatic lesions, and and infiltrating spreading malignant appearing process in the upper abdomen including gastroduodenal region and pancreas, with stranding and nodularity in the upper abdomen, extending downward along the left retroperitoneum para renal fascia and into the para-aortic and aortocaval retroperitoneum where there is lymphadenopathy.   2. Developing splenic infarcts, with compromise of the left splenic vein and splenic artery by tumor.  3. Left hydronephrosis, probably secondary to tumor implants around the proximal left ureter, with demonstration of enhancing soft tissue in this region, and dilation of the left ureter proximal to the lesion.  4. Small amount of likely malignant free fluid in the upper abdomen and trace amount of pelvis, but no large or drainable ascites.  5. Sclerotic metastatic bone lesions.   6. . No sign of bowel obstruction, or free air.  Small pleural effusions.  Small pericardial effusion.  Other findings as above.   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    Discussion held with pt and his wife again yesterday.  He understands he is too weak for chemotherapy and does not want further procedures.      Hospice RN notes reviewed.   Given his PS and recent events, feel that this is his best option.  > 45 mins discussion with dtr, wife, patient yesterday.    He wishes to try to go home as soon as possible.  He is amenable to Hospice after our discussions yesterday, but wishes to speak with Dr. Henning about this as well.   He wishes to communicate with Dr. Henning and our NN Flavia as to his situation as well and was encouraged to do so.    Andrew Rivers MD.  [facility]  Department of Oncology and Hematology

## 2024-04-30 NOTE — PLAN OF CARE
NURSING DISCHARGE NOTE    Discharged Home w/ [facility] via Ambulance.  Accompanied by Family member and Support staff  Belongings Taken by patient/family.    Pt received A&Ox4. Afebrile. VSS. Denies pain. Plan to dc home on hospice w/ [facility] this afternoon. Geovanna w/ [facility] at bedside, POC d/w pt. POLST signed by Dr. Valladares. Dc instructions given to pt and pt's family at bedside. Verbalized understanding. Port de-cannulated.

## 2024-05-03 NOTE — CDS QUERY
DOCUMENTATION CLARIFICATION QUERY  Dear Dr. Valladares,  Please further specify the diagnosis of Malnutrition documented by the Clinical dietician:      [    ] Severe protein calorie malnutrition    [    ] Other  (please specify) _________________      CLINICAL INDICATORS:   -4/26/24 Dietary Malnutrition note: NUTRITION ASSESSMENT:    Pt meets severe malnutrition criteria at this time.  CRITERIA FOR MALNUTRITION DIAGNOSIS:    Criteria for severe malnutrition diagnosis: chronic illness related to wt loss  greater than 7.5% in 3 months, energy intake less than 75% for greater than 1  month, body fat severe depletion, and muscle mass severe depletion  - Pt with significant wt loss over the past few months and pt with severe muscle and fat depletion.  WEIGHT HISTORY:    Weight loss: Yes, Severe Wt loss of 15 kg, 16%, over 4 months    Wt Readings from Last 7 Encounters:  04/26/24 80.7 kg (178 lb)  04/05/24 88.5 kg (195 lb)  03/24/24 88 kg (193 lb 14.4 oz)  02/23/24 90.7 kg (200 lb)  12/21/23 95.7 kg (211 lb)  04/07/23 96.6 kg (213 lb)  02/27/23 105.2 kg (232 lb)  - NUTRITION RELATED PHYSICAL FINDINGS:    1.Body Fat/Muscle Mass: severe depletion body fat Orbital fat pad and  Triceps and severe muscle depletion Temple region, Clavicle region, and  Shoulder/Acromion process    RISK FACTORS:   GIB and recently diagnosed with metastatic neuroendocrine tumor   TREATMENT: Dietician consult -  Nutrition education - Medical food supplements - Transition to hospice     Use of terms such as suspected, possible, or probable (associated with a specific diagnosis that is being evaluated, monitored, or treated as if it exists) are acceptable and can be coded in the inpatient setting, when documented at the time of discharge.   Please add any additional documentation to your progress note and continue to document this through discharge.  Marifer Contreras RN, BSN, CWOCN [facility] Clinical   [phone]                                                                                    THIS FORM IS A PART OF THE PERMANENT MEDICAL RECORD

## 2024-05-08 NOTE — DISCHARGE SUMMARY
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    Discharged Condition: fair    Disposition: home with hospice    Important follow up:  Maximilian Infante MD  [address] [place] [address]  [address] [place] [address]  [phone]    Follow up in 2 week(s)  To discuss ureteral stent placement.        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      **melena, diarrhea in setting of   **recent EGD with ulcerated gastric cardia mass with clot and friable mucosa with 2 endoclips placed 3/25/24 per GI.   - egd 4/27 with friable mass again seen, not actively bleeding. GEJ stricture noted, had swallow eval and was recommended to have regular diet with nectar thick liquids.  -cont ppi  -had been on empiric abx zosyn, not sure there is still a clear indication for this so has been stopped.      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   -CT with progression of metastasis,  suspected splenic infarcts d/t tumor burden to vasculature  - decadron restarted per onc recs.  -Dr. Rivers saw pt from oncology and they were agreeable to home with hospice     **anemia/thrombocytopenia  - hgb declined from 14.3 to 11.9, plts declined from 162 to 67 but counts have been variable over the past month with admissions related to bleeding from the gastric mass and with the metastatic neuroendocrine cancer.   - relatively stable on dc     **incidental finding of L hydronephrosis d/t tumor burden  -creatinine slightly elevated. Urinating ok. UA contaminated.  -urology consult, appreciate. No plan for intervention at this time given discussion of above goals of care     **hyponatremia/bro- electrolytes had improved with IVF. Could be component of hydronephrosis as well.     Day of discharge Exam  Vitals:    04/30/24 1159   BP: 122/80   Pulse: 83   Resp: 18   Temp: 97.6 °F (36.4 °C)     Exam on day of discharge:  Gen: No acute distress, alert and oriented  CV: RRR, +s1/s2  Lungs: CTAB, good respiratory effort  Abdomen: s/nt/nd  Ext: Moves all 4 extremities, no c/c/e  Neuro: CN Intact, no focal deficits    Discharge meds     Medication List        CHANGE how you take these medications      dexamethasone 4 MG tablet  Commonly known as: Decadron  Take 1 tablet (4 mg total) by mouth every 8 (eight) hours.  What changed: when to take this     pantoprazole 40 MG Tbec  Commonly known as: Protonix  Take 1 tablet (40 mg total) by mouth 2 (two) times daily before meals.  What changed: Another medication with the same name was removed. Continue taking this medication, and follow the directions you see here.            CONTINUE taking these medications      allopurinol 300 MG Tabs  Commonly known as: Zyloprim  Take 1 tablet (300 mg total) by mouth daily.     carvedilol 25 MG Tabs  Commonly known as: Coreg  Take 1 tablet (25 mg total) by mouth 2 (two) times daily with meals.     * Glucose Blood Strp  ONE EVERY DAY     *  Contour Next Test Strp  Generic drug: Glucose Blood  Test blood sugar daily     metFORMIN  MG Tb24  Commonly known as: Glucophage XR  Take 1 tablet (500 mg total) by mouth daily with breakfast.     Microlet Lancets Misc  Test daily     ondansetron 4 MG Tbdp  Commonly known as: Zofran-ODT     prochlorperazine 10 mg tablet  Commonly known as: Compazine     sucralfate 1 g Tabs  Commonly known as: Carafate  Take 1 tablet (1 g total) by mouth 3 (three) times daily before meals.           * This list has 2 medication(s) that are the same as other medications prescribed for you. Read the directions carefully, and ask your doctor or other care provider to review them with you.                STOP taking these medications      Fenofibrate 54 MG Tabs     rosuvastatin 10 MG Tabs  Commonly known as: Crestor               Where to Get Your Medications        These medications were sent to [facility]/pharmacy #[id number] - [place], [place] - [address] [phone], [phone] [address] [facility] [address]      Phone: [phone]   dexamethasone 4 MG tablet       Consults: IP CONSULT TO GASTROENTEROLOGY  IP CONSULT TO ONCOLOGY  IP CONSULT TO UROLOGY  IP CONSULT TO SOCIAL WORK  IP CONSULT TO SOCIAL WORK  NURSING CONSULT TO DIETITIAN  IP CONSULT TO HOSPICE  Radiology: XR VIDEO SWALLOW (CPT=74230)    Result Date: 4/29/2024            PROCEDURE:  XR VIDEO SWALLOW (CPT=74230)  TECHNIQUE:  Video fluoroscopic swallowing study was performed in cooperation with the speech pathologist.  Barium of varying consistencies was administered orally with patient in lateral projection.  COMPARISON:  None.  INDICATIONS:  Frequent coughing/choking with thin liquid intake at home x1 month  PATIENT STATED HISTORY: (As transcribed by Technologist)  Patient offered no additional information at this time.   CINE CAPTURES:  7 FLUORSCOPY TIME:  1 minute 43 seconds RADIATION DOSE (AIR KERMA PRODUCT):  43.4 uGy/m2   FINDINGS:  PHARYNGEAL  PHASE:  Normal for age. ASPIRATION:  Trace. PENETRATION:  Positive, multiple episodes of penetration seen. OTHER:  Negative.  PLEASE SEE SPEECH PATHOLOGIST REPORT FOR FULL ASSESSMENT OF SWALLOWING MECHANISM.   LOCATION:  [facility]    Dictated by (CST): Handy Dhaliwal MD on 4/29/2024 at 9:13 AM     Finalized by (CST): Handy Dhaliwal MD on 4/29/2024 at 9:13 AM       CT ABDOMEN+PELVIS(CONTRAST ONLY)(CPT=74177)    Result Date: 4/25/2024  PROCEDURE:  CT ABDOMEN+PELVIS (CONTRAST ONLY) (CPT=74177)  COMPARISON:  [facility], CT, CT CHEST+ABDOMEN+PELVIS(ALL CNTRST ONLY)(CPT=71260/75318), 4/07/2023, 6:55 PM.  INDICATIONS:  History of gastric carcinoma.  periumbilical abd pain, diarrhea/melena  TECHNIQUE:  CT scanning was performed from the dome of the diaphragm to the pubic symphysis with non-ionic intravenous contrast material. Post contrast coronal MPR imaging was performed.  Dose reduction techniques were used. Dose information is transmitted to the ACR (American College of Radiology) NRDR (National Radiology Data Registry) which includes the Dose Index Registry.  PATIENT STATED HISTORY:(As transcribed by Technologist)  abd pain, diarrhea   CONTRAST USED:  100 mLcc of Isovue 370  FINDINGS:   Malignant appearing process in the upper abdomen.  The stomach is not well distended but there is widespread mucosal irregular thickened enhancement and poorly defined enhancing nodularity in the distal stomach may reflect the known gastric malignancy in  this patient.  In addition there are patchy areas of diminished enhancement involving the pancreas, including the largest of these in the uncinate process axial image 38 series 2 measuring 2.8 cm with additional areas of low attenuation in the pancreas.   Stranding of the fat in the pancreaticoduodenal region with thickening of the right duodenum.  Subcentimeter adjacent lymph nodes in this region.  Stranding of the retroperitoneal fat on the left inferior to the pancreas extending into the  anterior left para renal fascia and also extending to the left para-aortic retroperitoneum with multiple small areas of nodularity and stranding in this region.  The splenic vein is now markedly attenuated.  Splenic artery is poorly seen.  Numerous collateral vessels are seen along the region of the pancreatic tail heading toward the splenic hilum.  Splenic infarcts are seen with wedge-shaped defects in the inferior aspect of the spleen outlined sharply with normally enhancing spleen.  No splenic enlargement.   Numerous hepatic metastatic lesions are now demonstrated in both right and left hepatic lobes.  Small amount of free fluid upper abdomen without signs of large drainable ascites   LIVER:  As above  BILIARY:  As above  PANCREAS:  As above  SPLEEN:  As above  KIDNEYS:  Left-sided hydronephrosis with mild dilation intrarenal collecting system and moderate dilation of the left renal pelvis, with focus of enhancement series 2 images 55-62 proximal left ureter.  No ureteral stone identified.  There may be some enhancement distal to this as well although the distal ureter does not appear dilated.  The right kidney shows no sign of hydronephrosis.  Small cyst upper pole left kidney laterally.  ADRENALS:  Left adrenal nodule 1.8 cm.  The nodules low-attenuation not present on the prior.   AORTA:  No aortic aneurysm. There are atherosclerotic changes including calcification involving the aorta, but no evidence for aneurysm involving the aorta.  RETROPERITONEUM:  Enlarged retroperitoneal lymph nodes are present..  BOWEL/MESENTERY:  See above regarding the stomach.  No sign of bowel obstruction.  Moderate stool in the colon.  No free air.  Small free fluid pelvis, and some fluid in the upper abdomen around liver and spleen but no large drainable ascites.  ABDOMINAL WALL:  Unremarkable.  URINARY BLADDER:  Unremarkable.  LYMPH NODES PELVIS:  Unremarkable.  PELVIC ORGANS:  Prostate enlargement.  LUNG BASES:  Development of  small left and trace right pleural effusion and bilateral lower lobe patchy atelectasis or lung infiltrates.  Small pericardial effusion.  BONES:  Metastatic sclerotic bone lesions.              CONCLUSION:   1. Progression of malignancy, now with multiple hepatic metastatic lesions, and and infiltrating spreading malignant appearing process in the upper abdomen including gastroduodenal region and pancreas, with stranding and nodularity in the upper abdomen, extending downward along the left retroperitoneum para renal fascia and into the para-aortic and aortocaval retroperitoneum where there is lymphadenopathy.  2. Developing splenic infarcts, with compromise of the left splenic vein and splenic artery by tumor.  3. Left hydronephrosis, probably secondary to tumor implants around the proximal left ureter, with demonstration of enhancing soft tissue in this region, and dilation of the left ureter proximal to the lesion.  4. Small amount of likely malignant free fluid in the upper abdomen and trace amount of pelvis, but no large or drainable ascites.  5. Sclerotic metastatic bone lesions.   6. . No sign of bowel obstruction, or free air.  Small pleural effusions.  Small pericardial effusion.  Other findings as above.   LOCATION:  [facility]   Dictated by (CST): Handy Dhaliwal MD on 4/25/2024 at 10:18 PM     Finalized by (CST): Handy Dhaliwal MD on 4/25/2024 at 10:31 PM       Operative Procedures: Procedure(s) (LRB):  ESOPHAGOGASTRODUODENOSCOPY (EGD) (N/A)  Activity: activity as tolerated  Diet: regular diet  Wound Care: none needed  Code Status: Prior  O2: none  Total Time Coordinating Care: 35 minutes Patient had opportunity to ask questions and state understand and agree with therapeutic plan as outlined    I reconciled current and discharge medications on day of discharge.

## (undated) DEVICE — Device: Brand: DEFENDO AIR/WATER/SUCTION AND BIOPSY VALVE

## (undated) DEVICE — ENDOSCOPY PACK - LOWER: Brand: MEDLINE INDUSTRIES, INC.

## (undated) DEVICE — FILTERLINE NASAL ADULT O2/CO2

## (undated) DEVICE — LIGHT HANDLE

## (undated) DEVICE — BREAST-HERNIA-PORT CDS-LF: Brand: MEDLINE INDUSTRIES, INC.

## (undated) DEVICE — CAP DST ATTCH 11.35X4MM SCP

## (undated) DEVICE — BITEBLOCK ENDOSCP 60FR MAXI STRP

## (undated) DEVICE — SUT SLK 2-0 30IN MULTPK BK

## (undated) DEVICE — 1200CC GUARDIAN II: Brand: GUARDIAN

## (undated) DEVICE — C-ARM: Brand: UNBRANDED

## (undated) DEVICE — 3M™ RED DOT™ MONITORING ELECTRODE WITH FOAM TAPE AND STICKY GEL, 50/BAG, 20/CASE, 72/PLT 2570: Brand: RED DOT™

## (undated) DEVICE — #11 STERILE BLADE: Brand: POLYMER COATED BLADES

## (undated) DEVICE — SUTURE PROL SZ 2-0 L30IN NONABSORBABLE BLU

## (undated) DEVICE — REPLAY HEMOSTASIS CLIP, 11MM SPAN: Brand: REPLAY

## (undated) DEVICE — KIT CUSTOM ENDOPROCEDURE STERIS

## (undated) DEVICE — 10FT COMBINED O2 DELIVERY/CO2 MONITORING. FILTER WITH MICROSTREAM TYPE LUER: Brand: DUAL ADULT NASAL CANNULA

## (undated) DEVICE — KIT ENDO ORCAPOD 160/180/190

## (undated) DEVICE — BLOCK BITE MAXI 60FR

## (undated) DEVICE — STERILE SYNTHETIC POLYISOPRENE POWDER-FREE SURGICAL GLOVES WITH HYDROGEL COATING, SMOOTH FINISH, STRAIGHT FINGER: Brand: PROTEXIS

## (undated) DEVICE — BIOGUARD CLEANING ADAPTER

## (undated) DEVICE — ADHESIVE SKIN TOP FOR WND CLSR DERMBND ADV

## (undated) DEVICE — SUTURE MCRYL SZ 4-0 L18IN ABSRB UD L19MM PS-2

## (undated) DEVICE — KIT VLV 5 PC AIR H2O SUCT BX ENDOGATOR CONN

## (undated) DEVICE — Device

## (undated) DEVICE — SOLUTION IRRIG 1000ML 0.9% NACL USP BTL

## (undated) DEVICE — SLEEVE COMPR M KNEE LEN SGL USE KENDALL SCD

## (undated) DEVICE — FORCEP RADIAL JAW 4

## (undated) DEVICE — SUTURE VCRL SZ 3-0 L27IN ABSRB UD L26MM SH

## (undated) NOTE — LETTER
To Whom It May Concern:  This certifies that Renato Hall was seen in my office today accompanied by his parent. Please excuse ***, his parent from {em school/work:[id number]} today. Do not hesitate to call with any questions or concerns.        Sincerely,    No name on file  [facility]  [address] [place] [id number]  [phone]        Document generated by: Chrissy MIRELES RN

## (undated) NOTE — LETTER
9/21/2017          [address] [address]    Dear Timothy Kemp,     Here are the  biopsy/pathology findings from your recent Colonoscopy :    and an adenomatous polyp(s), which is a benign potentially pre-cancerous growth t

## (undated) NOTE — LETTER
[facility]  [address] [facility] [address]  [phone]    Blood Transfusion Consent    In the course of your treatment, it may become necessary to administer a transfusion of blood or blood components. This form provides basic information concerning this procedure and, if signed by you, authorizes its administration. By signing this form, you agree that all of your questions about the administration of blood or blood products have been answered by the ordering medical professional or designee.    Description of Procedure  Blood is introduced into one of your veins, commonly in the arm, using a sterilized disposable needle. The amount of blood transfused, and whether the transfusion will be of blood or blood components is a judgement the physician will make based on your particular needs.    Risks  The transfusion is a common procedure of low risk.  MINOR AND TEMPORARY REACTIONS ARE NOT UNCOMMON, including a slight bruise, swelling or local reaction in the area where the needle pierces your skin, or a nonserious reaction to the transfused material itself, including headache, fever or mild skin reaction, such as rash.  Serious reactions are possible, though very unlikely, and include severe allergic reaction (shock) and destruction (hemolysis) of transfused blood cells.  Infectious diseases which are known to be transmitted by blood transfusion include certain types of viral Hepatitis(liver infection from a virus), Human Immunodeficiency Virus (HIV-1,2) infection, a viral infection known to cause Acquired Immunodeficiency Syndrome (AIDS), as well as certain other bacterial, viral, and parasitic diseases. While a minimal risk of acquiring an infectious disease from transfused blood exists, in accordance with the Federal and State law, all due care has been taken in donor selection and testing to avoid transmission of disease.    Alternatives  If loss of blood poses serious threats during your  treatment, THERE IS NO EFFECTIVE ALTERNATIVE TO BLOOD TRANSFUSION. However, if you have any further questions on this matter, your provider will fully explain the alternatives to you if it has not already been done.    I, ______________________________, have read/had read to me the above. I understand the matters bearing on the decision whether or not to authorize a transfusion of blood or blood components. I have no questions which have not been answered to my full satisfaction. I hereby consent to such transfusion as my physician may deem necessary or advisable in the course of my treatment.    ______________________________________________                    ___________________________  (Signature of Patient or Responsible party in case of minor,                 (Printed Name of Patient or incompetent, or unconscious patient)              Responsible Party)    ___________________________               _____________________  (Relationship to Patient if not self)                                    (Date and Time)    __________________________                                                           ______________________              (Signature of Witness)               (Printed Name of Witness)     Language line ()    Telephone/Verbal/Video Consent    __________________________                     ____________________  (Signature of 2nd Witness           (Printed Name of 2nd  Telephone/Verbal/Video Consent)           Witness)    Patient Name: Renato Hall     : 1950                 Printed: 2024     Medical Record #: [id number]      Rev: 2023

## (undated) NOTE — ED AVS SNAPSHOT
[facility] Emergency Department    [place]  [address] [address]    Phone:  [phone]    Fax:  [address]   MRN: [id number]    Department:  [facility] Emergency Department   Date of Visit:  3/24/2 LOWER MUSCULOSKELETAL INJURIES, BRACES FOR (ENGLISH)      Disclosure     Insurance plans vary and the physician(s) referred by the ER may not be covered by your plan.  Please contact your insurance company to determine coverage for follow-up care and refer prescription right away and begin taking the medication(s) as directed    If the emergency physician has read X-rays, these will be re-interpreted by a radiologist.  If there is a significant change in your reading, you will be contacted.  Please make sure Medicaid plans. To get signed up and covered, please call [phone] and ask to get set up for an insurance coverage that is in-network with [facility].         Imaging Results         XR FOOT, COMPLETE (MIN 3 VIEWS), RIGHT (CPT=73630) (Fin Call [phone] for help. [employer] is NOT to be used for urgent needs. For medical emergencies, dial 911.

## (undated) NOTE — ED AVS SNAPSHOT
[facility] Emergency Department    [place]  [address] [address]    Phone:  [phone]    Fax:  [address]   MRN: [id number]    Department:  [facility] Emergency Department   Date of Visit:  3/24/2 IF THERE IS ANY CHANGE OR WORSENING OF YOUR CONDITION, CALL YOUR PRIMARY CARE PHYSICIAN AT ONCE OR RETURN IMMEDIATELY TO THE EMERGENCY DEPARTMENT.     If you have been prescribed any medication(s), please fill your prescription right away and begin taking t

## (undated) NOTE — LETTER
[address]  [address]  Authorization for Surgical Operation and Procedure     Date:___________                                                                                                         Time:__________  I hereby authorize Surgeon(s):  Ritchie Jane MD, my physician and his/her assistants (if applicable), which may include medical students, residents, and/or fellows, to perform the following surgical operation/ procedure and administer such anesthesia as may be determined necessary by my physician:  Operation/Procedure name (s) Procedure(s):  ESOPHAGOGASTRODUODENOSCOPY (EGD) on Renato Hall   2.   I recognize that during the surgical operation/procedure, unforeseen conditions may necessitate additional or different procedures than those listed above.  I, therefore, further authorize and request that the above-named surgeon, assistants, or designees perform such procedures as are, in their judgment, necessary and desirable.    3.   My surgeon/physician has discussed prior to my surgery the potential benefits, risks and side effects of this procedure; the likelihood of achieving goals; and potential problems that might occur during recuperation.  They also discussed reasonable alternatives to the procedure, including risks, benefits, and side effects related to the alternatives and risks related to not receiving this procedure.  I have had all my questions answered and I acknowledge that no guarantee has been made as to the result that may be obtained.    4.   Should the need arise during my operation/procedure, which includes change of level of care prior to discharge, I also consent to the administration of blood and/or blood products.  Further, I understand that despite careful testing and screening of blood or blood products by collecting agencies, I may still be subject to ill effects as a result of receiving a blood transfusion and/or blood products.  The  following are some, but not all, of the potential risks that can occur: fever and allergic reactions, hemolytic reactions, transmission of diseases such as Hepatitis, AIDS and Cytomegalovirus (CMV) and fluid overload.  In the event that I wish to have an autologous transfusion of my own blood, or a directed donor transfusion, I will discuss this with my physician.  Check only if Refusing Blood or Blood Products  I understand refusal of blood or blood products as deemed necessary by my physician may have serious consequences to my condition to include possible death. I hereby assume responsibility for my refusal and release the hospital, its personnel, and my physicians from any responsibility for the consequences of my refusal.          o  Refuse      5.   I authorize the use of any specimen, organs, tissues, body parts or foreign objects that may be removed from my body during the operation/procedure for diagnosis, research or teaching purposes and their subsequent disposal by hospital authorities.  I also authorize the release of specimen test results and/or written reports to my treating physician on the hospital medical staff or other referring or consulting physicians involved in my care, at the discretion of the Pathologist or my treating physician.    6.   I consent to the photographing or videotaping of the operations or procedures to be performed, including appropriate portions of my body for medical, scientific, or educational purposes, provided my identity is not revealed by the pictures or by descriptive texts accompanying them.  If the procedure has been photographed/videotaped, the surgeon will obtain the original picture, image, videotape or CD.  The hospital will not be responsible for storage, release or maintenance of the picture, image, tape or CD.    7.   I consent to the presence of a  or observers in the operating room as deemed necessary by my physician or their designees.     8.   I recognize that in the event my procedure results in extended X-Ray/fluoroscopy time, I may develop a skin reaction.    9. If I have a Do Not Attempt Resuscitation (DNAR) order in place, that status will be suspended while in the operating room, procedural suite, and during the recovery period unless otherwise explicitly stated by me (or a person authorized to consent on my behalf). The surgeon or my attending physician will determine when the applicable recovery period ends for purposes of reinstating the DNAR order.  10. Patients having a sterilization procedure: I understand that if the procedure is successful the results will be permanent and it will therefore be impossible for me to inseminate, conceive, or bear children.  I also understand that the procedure is intended to result in sterility, although the result has not been guaranteed.   11. I acknowledge that my physician has explained sedation/analgesia administration to me including the risk and benefits I consent to the administration of sedation/analgesia as may be necessary or desirable in the judgment of my physician.    I CERTIFY THAT I HAVE READ AND FULLY UNDERSTAND THE ABOVE CONSENT TO OPERATION and/or OTHER PROCEDURE.    _________________________________________  __________________________________  Signature of Patient     Signature of Responsible Person         ___________________________________         Printed Name of Responsible Person           _________________________________                 Relationship to Patient  _________________________________________  ______________________________  Signature of Witness          Date  Time      Patient Name: Renato Hall     : 1950                 Printed: 2024     Medical Record #: [id number]                     Page 1 of [address] [place], [place] [address], [place]  [address]    Consent for Anesthesia    I, Renato Hall agree to be  cared for by an anesthesiologist, who is specially trained to monitor me and give me medicine to put me to sleep or keep me comfortable during my procedure    I understand that my anesthesiologist is not an employee or agent of [facility] or [employer]. He or she works for [employer].    As the patient asking for anesthesia services, I agree to:  Allow the anesthesiologist (anesthesia doctor) to give me medicine and do additional procedures as necessary. Some examples are: Starting or using an “IV” to give me medicine, fluids or blood during my procedure, and having a breathing tube placed to help me breathe when I’m asleep (intubation). In the event that my heart stops working properly, I understand that my anesthesiologist will make every effort to sustain my life, unless otherwise directed by [facility] Do Not Resuscitate documents.  Tell my anesthesia doctor before my procedure:  If I am pregnant.  The last time that I ate or drank.  All of the medicines I take (including prescriptions, herbal supplements, and pills I can buy without a prescription (including street drugs/illegal medications). Failure to inform my anesthesiologist about these medicines may increase my risk of anesthetic complications.  If I am allergic to anything or have had a reaction to anesthesia before.  I understand how the anesthesia medicine will help me (benefits).  I understand that with any type of anesthesia medicine there are risks:  The most common risks are: nausea, vomiting, sore throat, muscle soreness, damage to my eyes, mouth, or teeth (from breathing tube placement).  Rare risks include: remembering what happened during my procedure, allergic reactions to medications, injury to my airway, heart, lungs, vision, nerves, or muscles and in extremely rare instances death.  My doctor has explained to me other choices available to me for my care (alternatives).  Pregnant Patients  (“epidural”):  I understand that the risks of having an epidural (medicine given into my back to help control pain during labor), include itching, low blood pressure, difficulty urinating, headache or slowing of the baby’s heart. Very rare risks include infection, bleeding, seizure, irregular heart rhythms and nerve injury.  Regional Anesthesia (“spinal”, “epidural”, & “nerve blocks”):  I understand that rare but potential complications include headache, bleeding, infection, seizure, irregular heart rhythms, and nerve injury.    I can change my mind about having anesthesia services at any time before I get the medicine.    _____________________________________________________________________________  Patient (or Representative) Signature/Relationship to Patient  Date   Time    _____________________________________________________________________________   Name (if used)    Language/Organization   Time    _____________________________________________________________________________  Anesthesiologist Signature     Date   Time  I have discussed the procedure and information above with the patient (or patient’s representative) and answered their questions. The patient or their representative has agreed to have anesthesia services.    _____________________________________________________________________________  Witness        Date   Time  I have verified that the signature is that of the patient or patient’s representative, and that it was signed before the procedure  Patient Name: Renato Hall     : 1950                 Printed: 2024     Medical Record #: [id number]                     Page 2 of 2

## (undated) NOTE — IP AVS SNAPSHOT
Patient Demographics     Address  [address] [facility] [address] Phone  [phone] (Home)  [phone] (Mobile) *Preferred* E-mail Address  [email or web address]      Patient Contacts     Name Relation Home Work Mobile    Dayan Hall Spouse [phone]    Renato Hall Son   [phone]      Allergies as of 4/30/2024  Review status set to In Progress on 4/25/2024       Noted Reaction Type Reactions    Amlodipine 08/22/2021   Side Effect SWELLING    Ankle swelling on amlodipine.      Code Status Information     Code Status    Prior        Patient Instructions       Sometimes managing your health at home requires assistance.  The [facility] team has recognized your preference to use [facility].  They can be reached by phone at [phone].  The fax number for your reference is [phone].      Follow-up Information     Maximilian Infante MD Follow up in 2 week(s).    Specialty: UROLOGY  Why: To discuss ureteral stent placement.  Contact information:  [address] [place] [address]  [address] [place] [id number] [phone]                        Your Home Meds List      TAKE these medications       Instructions Authorizing Provider Morning Afternoon Evening As Needed   allopurinol 300 MG Tabs  Commonly known as: Zyloprim  Next dose due: Tomorrow morning       Take 1 tablet (300 mg total) by mouth daily.   Deyanira Resendez         carvedilol 25 MG Tabs  Commonly known as: Coreg  Next dose due: Tonight       Take 1 tablet (25 mg total) by mouth 2 (two) times daily with meals.   Deyanira Resendez         dexamethasone 4 MG tablet  Commonly known as: Decadron  Next dose due: Tonight       Take 1 tablet (4 mg total) by mouth every 8 (eight) hours.   Jennifer Valladares         Glucose Blood Strp      ONE EVERY DAY   Jame Cuadra         Contour Next Test Strp  Generic drug: Glucose Blood      Test blood sugar daily   Hernandez Prajapati         metFORMIN  MG Tb24  Commonly known as: Glucophage  XR  Next dose due: Tomorrow morning       Take 1 tablet (500 mg total) by mouth daily with breakfast.   Deyanira Resendez         Microlet Lancets Misc      Test daily   Hernandez Prajapati         ondansetron 4 MG Tbdp  Commonly known as: Zofran-ODT  Next dose due: As needed      Take 1 tablet (4 mg total) by mouth every 8 (eight) hours as needed for Nausea.          pantoprazole 40 MG Tbec  Commonly known as: Protonix  Next dose due: Tonight       Take 1 tablet (40 mg total) by mouth 2 (two) times daily before meals.   Hemant Davis         prochlorperazine 10 mg tablet  Commonly known as: Compazine  Next dose due: As needed      Take 1 tablet (10 mg total) by mouth every 6 (six) hours as needed for Nausea.          sucralfate 1 g Tabs  Commonly known as: Carafate  Next dose due: Tonight       Take 1 tablet (1 g total) by mouth 3 (three) times daily before meals.   Hemant Davis               Where to Get Your Medications      These medications were sent to [facility]/pharmacy #[id number] - [place], [place] - [address] [phone], [phone]  [facility] [address]    Phone: [phone]   dexamethasone 4 MG tablet           [phone] - MAR ACTION REPORT  (last 48 hrs)    ** SITE UNKNOWN **     Order ID Medication Name Action Time Action Reason Comments    [id number] dexamethasone (Decadron) 4 MG/ML injection 4 mg 04/28/24 1745 Given      [id number] dexamethasone (Decadron) 4 MG/ML injection 4 mg 04/29/24 0105 Given      [id number] dexamethasone (Decadron) 4 MG/ML injection 4 mg 04/29/24 0957 Given      [id number] dexamethasone (Decadron) 4 MG/ML injection 4 mg 04/29/24 1741 Given      [id number] dexamethasone (Decadron) 4 MG/ML injection 4 mg 04/30/24 0234 Given      [id number] dexamethasone (Decadron) 4 MG/ML injection 4 mg 04/30/24 0952 Given      [id number] pantoprazole (Protonix) 40 mg in sodium chloride 0.9% PF 10 mL IV push 04/28/24 1353 Given      [id number] pantoprazole (Protonix) 40 mg in sodium chloride  0.9% PF 10 mL IV push 04/29/24 0105 Given      [id number] pantoprazole (Protonix) 40 mg in sodium chloride 0.9% PF 10 mL IV push 04/29/24 1141 Given      [id number] pantoprazole (Protonix) 40 mg in sodium chloride 0.9% PF 10 mL IV push 04/30/24 0126 Given            LEFT LOWER ABDOMEN     Order ID Medication Name Action Time Action Reason Comments    [id number] insulin aspart (NovoLOG) 100 Units/mL FlexPen 1-5 Units 04/28/24 1402 Given      [id number] insulin aspart (NovoLOG) 100 Units/mL FlexPen 1-5 Units 04/29/24 1140 Given            LEFT UPPER ABDOMEN     Order ID Medication Name Action Time Action Reason Comments    [id number] insulin aspart (NovoLOG) 100 Units/mL FlexPen 1-5 Units 04/28/24 1735 Given      [id number] insulin aspart (NovoLOG) 100 Units/mL FlexPen 1-5 Units 04/29/24 1635 Given            LEFT UPPER ARM     Order ID Medication Name Action Time Action Reason Comments    [id number] insulin aspart (NovoLOG) 100 Units/mL FlexPen 1-5 Units 04/28/24 2049 Given      [id number] insulin aspart (NovoLOG) 100 Units/mL FlexPen 1-5 Units 04/29/24 0517 Given      [id number] insulin aspart (NovoLOG) 100 Units/mL FlexPen 1-5 Units 04/29/24 2212 Given      [id number] insulin aspart (NovoLOG) 100 Units/mL FlexPen 1-5 Units 04/30/24 0611 Given              Recent Vital Signs    Flowsheet Row Most Recent Value   /78 Filed at 04/30/2024 0918   Pulse 75 Filed at 04/30/2024 0918   Resp 18 Filed at 04/30/2024 0918   Temp 97.6 °F (36.4 °C) Filed at 04/30/2024 0918   SpO2 95 % Filed at 04/30/2024 0918      Patient's Most Recent Weight    Flowsheet Row Most Recent Value   Patient Weight 80.8 kg (178 lb 1.6 oz)         Lab Results Last 24 Hours      Basic Metabolic Panel (8) [[id number]] (Abnormal)  Resulted: 04/30/24 0614, Result status: Final result   Ordering provider: Jennifer Valladares MD  04/29/24 2300 Resulting lab: [facility] ([facility])    Specimen Information    Type Source Collected On   Blood —  04/30/24 0542          Components    Component Value Reference Range Flag Lab   Glucose 184 70 - 99 mg/dL [facility] ([facility])   Sodium 139 136 - 145 mmol/L — [facility] ([facility])   Potassium 4.3 3.5 - 5.1 mmol/L — [facility] ([facility])   Chloride 114 98 - 112 mmol/L [facility] ([facility])   CO2 17.0 21.0 - 32.0 mmol/L [facility] ([facility])   Anion Gap 8 0 - 18 mmol/L — [facility] ([facility])   BUN 33 9 - 23 mg/dL [facility] ([facility])   Creatinine 1.10 0.70 - 1.30 mg/dL — [facility] ([facility])   Calcium, Total 8.4 8.5 - 10.1 mg/dL [facility] ([facility])   Calculated Osmolality 300 275 - 295 mOsm/kg [facility] ([facility])   eGFR-Cr 71 >=60 mL/min/1.73m2 — [facility] ([facility])            CBC With Differential With Platelet [[id number]] (Abnormal)  Resulted: 04/30/24 0555, Result status: Final result   Ordering provider: Jennifer Valladares MD  04/29/24 2300 Resulting lab: [facility] ([facility])   Narrative:  The following orders were created for panel order CBC With Differential With Platelet.  Procedure                               Abnormality         Status                     ---------                               -----------         ------                     CBC W/ DIFFERENTIAL[[id number]]          Abnormal            Final result                 Please view results for these tests on the individual orders.    Specimen Information    Type Source Collected On   Blood — 04/30/24 0542            Testing Performed By     Lab - Abbreviation Name Director Address Valid Date Range    139 - [facility] ([facility]) [facility] ([facility]) Goldberg, Cathryn A. MD [address] [place] [address] 03/19/20 1441 - Present            Microbiology Results (All)     Procedure Component Value Units Date/Time    Blood Culture [[id number]] Collected: 04/25/24 2131    Order Status: Completed Lab Status: Preliminary result Updated: 04/30/24 0101    Specimen: Blood from Bld,Port a cath Line      Blood Culture Result No Growth 4 Days     Blood Culture [[id number]] Collected: 24    Order Status: Completed Lab Status: Preliminary result Updated: 24    Specimen: Blood,peripheral      Blood Culture Result No Growth 4 Days    Narrative:      Anaerobic Bottle Volume - 10 mL  Aerobic Bottle Volume - 12 mL      Pending Labs     Order Current Status    Blood Culture Preliminary result    Blood Culture Preliminary result         H&P - H&P Note      H&P signed by Vianey Otero MD at 2024  8:59 AM  Version 2 of 2    Author: Vianey Otero MD Service: Hospitalist Author Type: Physician    Filed: 2024  8:59 AM Date of Service: 2024  8:27 AM Status: Addendum    : Vianey Otero MD (Physician)    Related Notes: Original Note by Vianey Otero MD (Physician) filed at 2024  8:58 AM                [facility] History and Physical     Renato Hall Patient Status:  Inpatient    1950 MRN [id number]   [facility] 4NW-A Attending Vianey Otero, *   Hosp Day # 0 PCP Deyanira Resendez MD     Chief Complaint:  melena    Subjective:    Renato Hall is a 73 year old male with mmp including but not limited to metastatic gastric neuroendocrine cancer, HTN/HL, CM, DMII, gout, slurred speech and 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  Had in past-- on 3/25/24, had EGD showing ulcerated gastric cardia mass with clot and friable mucosa with 2 endoclips placed. When seen, pt generally weak, LH. No pain. No cp/sob/n/v/f/c. No new abdominal pain/dysuria, urgency. Wife at bedside.    History/Other:        History/Other:    Past Medical History:  Past Medical History:    Abnormal screening CT of heart    calcium score 6    Abnormal screening CT of heart    calcium score of 18    Abnormal screening CT of heart    calcium score 53    Adenomatous colon polyp    Allergic rhinitis due to pollen    BPH  (benign prostatic hyperplasia)    Cancer (HCC)    Neuroendocrine CA/Pancreatic    Cardiomyopathy (HCC)    2D Echo 9/22/23    COVID    No hospitalization. Harsh coughing    Degeneration of cervical intervertebral disc    Diverticula of colon    Essential hypertension, benign    GOUT    High blood pressure    High cholesterol    History of blood transfusion    No reaction    Hypertrophic cardiomyopathy (HCC)    Personal history of antineoplastic chemotherapy    Last treatment    Pure hypercholesterolemia    Tinnitus, bilateral    Type II or unspecified type diabetes mellitus without mention of complication, not stated as uncontrolled    Visual impairment    Glasses     Past Surgical History:   Past Surgical History:   Procedure Laterality Date    Cholecystectomy  06/21/1988    Colonoscopy  04/21/2008    tiny polyps    Colonoscopy  08/27/2014    Procedure: COLONOSCOPY;  Surgeon: Liang Quevedo MD;  Location:  ENDOSCOPY    Colonoscopy N/A 09/20/2017    Procedure: COLONOSCOPY;  Surgeon: Liang Quevedo MD;  Location:  ENDOSCOPY    Colonoscopy      Peripheral vascular screening historical conv Bilateral 05/22/2017    mild carotid narrowing, PAD screen    Upper gi endoscopy,exam      Vascular lab - dmg Bilateral 11/01/2011    PAD screening normal      Family History:   Family History   Problem Relation Age of Onset    Arthritis Father         TKA    Hypertension Father     Diabetes Father     Obesity Father     Cancer Mother 70        colon, ?uterine    Genito-Urinary Disorder Mother         hysterectomy    Substance Abuse Son     Asthma Son     High Cholesterol Son     Gastro-Intestinal Disorder Brother         colon polyps, GERD    Hypertension Brother     Neurological Disorder Daughter         MS    Diabetes Daughter     Hypertension Sister     Diabetes Sister     Lipids Sister      Social History:    reports that he quit smoking about 38 years ago. His smoking use included cigarettes. He started smoking  about 55 years ago. He has a 17 pack-year smoking history. He has never used smokeless tobacco. He reports that he does not drink alcohol and does not use drugs.       Allergies:   Allergies   Allergen Reactions    Amlodipine SWELLING     Ankle swelling on amlodipine.       Medications:    Current Facility-Administered Medications on File Prior to Encounter   Medication Dose Route Frequency Provider Last Rate Last Admin    [COMPLETED] magnesium oxide (Mag-Ox) tab 400 mg  400 mg Oral Once Jose Matt MD   400 mg at 24 1212    [COMPLETED] potassium chloride (K-Dur) tab 40 mEq  40 mEq Oral Q4H Jose Matt MD   40 mEq at 24 0949    [COMPLETED] magnesium oxide (Mag-Ox) tab 800 mg  800 mg Oral Once Jose Matt MD   800 mg at 24 0657    [COMPLETED] dexamethasone (Decadron) tab 4 mg  4 mg Oral 2 times per day Andrew Rivers MD   4 mg at 24 2124    [COMPLETED] gadoterate meglumine (Dotarem) 10 MMOL/20ML injection 20 mL  20 mL Intravenous ONCE PRN Jose Matt MD   18 mL at 24 1353    [COMPLETED] potassium chloride (K-Dur) tab 40 mEq  40 mEq Oral Once Guille Davis MD   40 mEq at 24 0806    [COMPLETED] sodium chloride 0.9% infusion   Intravenous Once Shawna Ford DO 10 mL/hr at 24 0615 New Bag at 24 0615    [COMPLETED] sodium chloride 0.9% infusion   Intravenous Once Bacilio Ornelas MD 10 mL/hr at 24 1000 New Bag at 24 1000    [COMPLETED] sodium chloride 0.9 % IV bolus 500 mL  500 mL Intravenous Once Abhi Monaco MD   Stopped at 24 1430    [COMPLETED] pantoprazole (Protonix) 40 mg in sodium chloride 0.9% PF 10 mL IV push  40 mg Intravenous Once Abhi Monaco MD   40 mg at 24 1344    [] sodium chloride 0.9% infusion   Intravenous Continuous Abhi Monaco MD        [COMPLETED] sodium chloride 0.9% infusion   Intravenous Once Vianey Otero MD 10 mL/hr at 24 1106 New Bag at 24 1106    [COMPLETED]  heparin (Porcine) 100 Units/mL lock flush 500 Units  5 mL Intravenous Once Aldo Padilla MD   500 Units at 24 0840     Current Outpatient Medications on File Prior to Encounter   Medication Sig Dispense Refill    prochlorperazine (COMPAZINE) 10 mg tablet Take 1 tablet (10 mg total) by mouth every 6 (six) hours as needed for Nausea.      pantoprazole 40 MG Oral Tab EC Take 1 tablet (40 mg total) by mouth 2 (two) times daily before meals. 60 tablet 0    sucralfate 1 g Oral Tab Take 1 tablet (1 g total) by mouth 3 (three) times daily before meals. 90 tablet 0    ondansetron 4 MG Oral Tablet Dispersible Take 1 tablet (4 mg total) by mouth every 8 (eight) hours as needed for Nausea.      allopurinol 300 MG Oral Tab Take 1 tablet (300 mg total) by mouth daily. 90 tablet 1    metFORMIN HCl ER (GLUCOPHAGE XR) 500 MG Oral Tablet 24 Hr Take 1 tablet (500 mg total) by mouth daily with breakfast. 90 tablet 1    carvedilol 25 MG Oral Tab Take 1 tablet (25 mg total) by mouth 2 (two) times daily with meals. 180 tablet 1    Microlet Lancets Does not apply Misc Test daily 100 each 3    Glucose Blood (CONTOUR NEXT TEST) In Vitro Strip Test blood sugar daily 100 each 1    Glucose Blood In Vitro Strip ONE EVERY  strip 3    [] dexamethasone (DECADRON) 4 MG tablet Take 1 tablet (4 mg total) by mouth every 12 (twelve) hours for 3 days. 6 tablet 0    pantoprazole 40 MG Oral Tab EC Take 1 tablet (40 mg total) by mouth 2 (two) times daily before meals. (Patient not taking: Reported on 2024) 60 tablet 0    rosuvastatin 10 MG Oral Tab Take 1 tablet (10 mg total) by mouth daily. (Patient not taking: Reported on 2024) 90 tablet 3    Fenofibrate 54 MG Oral Tab Take 1 tablet (54 mg total) by mouth daily. with food (Patient not taking: Reported on 2024) 90 tablet 1       Review of Systems:   A comprehensive 14 point review of systems was completed.    Pertinent positives and negatives noted in the  HPI.    Objective:     /62   Pulse 79   Temp 98.2 °F (36.8 °C) (Oral)   Resp 14   Wt 178 lb (80.7 kg)   SpO2 98%   BMI 27.88 kg/m²      General:  Alert, NAD, appears stated age, no accessory m use, chronically ill appearing, no conversational dyspnea   Head:  Normocephalic, without obvious abnormality, atraumatic.   Eyes:  Sclera anicteric,  EOMs intact. Lids wnl.    Ears, nose, throat:  external ears and nose within normal limits, hearing intact       Neck: Supple, symmetrical   Lungs:   Clear to auscultation bilaterally. ok effort   Chest wall:  No tenderness or deformity.   Heart:  Regular rate and rhythm, S1, S2 normal,no LE edema   Abdomen:   Soft, non-tender. Bowel sounds normal. . Non distended, no peritoneal signs   Extremities: Extremities normal, atraumatic, no edema.   Skin: Skin color, texture, turgor normal. No visible rashes     Neurologic:  Psychiatric: Low hoarse voice  appropriate affect,  answering questions ok       Results:         CBC/Chem  Recent Labs   Lab 04/25/24  1818   WBC 16.1*   HGB 14.3   MCV 89.1   .0   INR 1.11       Recent Labs   Lab 04/25/24  1818   *   K 4.6   CL 99   CO2 20.0*   BUN 64*   CREATSERUM 1.38*   *   CA 8.5       Recent Labs   Lab 04/25/24  1818   ALT 31   AST 38*   ALB 2.6*          Additional Diagnostics: ECG: NSR       Radiology: CT ABDOMEN+PELVIS(CONTRAST ONLY)(CPT=74177)    Result Date: 4/25/2024            CONCLUSION:   1. Progression of malignancy, now with multiple hepatic metastatic lesions, and and infiltrating spreading malignant appearing process in the upper abdomen including gastroduodenal region and pancreas, with stranding and nodularity in the upper abdomen, extending downward along the left retroperitoneum para renal fascia and into the para-aortic and aortocaval retroperitoneum where there is lymphadenopathy.  2. Developing splenic infarcts, with compromise of the left splenic vein and splenic artery by tumor.  3. Left  hydronephrosis, probably secondary to tumor implants around the proximal left ureter, with demonstration of enhancing soft tissue in this region, and dilation of the left ureter proximal to the lesion.  4. Small amount of likely malignant free fluid in the upper abdomen and trace amount of pelvis, but no large or drainable ascites.  5. Sclerotic metastatic bone lesions.   6. . No sign of bowel obstruction, or free air.  Small pleural effusions.  Small pericardial effusion.  Other findings as above.   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     **melena, diarrhea in setting of   **recent EGD with ulcerated gastric cardia mass with clot and friable mucosa with 2 endoclips placed 3/25/24 per GI. 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 Urinating ok. UA contaminated.  -urology consult, appreciate    **hyponatremia- on gentle IVF, monitor    **acute kidney injury, metabolic acidosis, trial ivf  Stable chronic illnesses:  HTN/HL, CM - hold po meds for  now  DMII- iss, accuchecks  Gout    PPx-scds for now    Dw pt/wife/RN Janell    Thank You,  Vianey Otero MD    [facility] Hospitalist  Internal Medicine  Answering Service number: [phone]    2024    Outpatient records or previous hospital records reviewed.     Further recommendations pending patient's clinical course.  [facility] hospitalist to continue to follow patient while in house    Patient and/or patient's family given opportunity to ask questions and note understanding and agreeing with therapeutic plan as outlined    Supplementary Documentation:                 Electronically signed by Vianey Otero MD on 2024  8:59 AM     H&P signed by Vianey Otero MD at 2024  8:58 AM  Version 1 of 2    Author: Vianey Otero MD Service: Hospitalist Author Type: Physician    Filed: 2024  8:58 AM Date of Service: 2024  8:27 AM Status: Signed    : Vianey Otero MD (Physician)    Related Notes: Addendum by Vianey Otero MD (Physician) filed at 2024  8:59 AM                [facility] History and Physical     Renato Hall Patient Status:  Inpatient    1950 MRN [id number]   [facility] 4NW-A Attending Vianey Otero, *   Hosp Day # 0 PCP Deyanira Resendez MD     Chief Complaint:  melena    Subjective:    Renato Hall is a 73 year old male with mmp including but not limited to metastatic gastric neuroendocrine cancer, HTN/HL, CM, DMII, gout, slurred speech and 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  Had in past-- on 3/25/24, had EGD showing ulcerated gastric cardia mass with clot and friable mucosa with 2 endoclips placed. When seen, pt generally weak, LH. No pain. No cp/sob/n/v/f/c. No new abdominal pain/dysuria, urgency. Wife at bedside.    History/Other:        History/Other:    Past Medical History:  Past Medical  History:    Abnormal screening CT of heart    calcium score 6    Abnormal screening CT of heart    calcium score of 18    Abnormal screening CT of heart    calcium score 53    Adenomatous colon polyp    Allergic rhinitis due to pollen    BPH (benign prostatic hyperplasia)    Cancer (HCC)    Neuroendocrine CA/Pancreatic    Cardiomyopathy (HCC)    2D Echo 9/22/23    COVID    No hospitalization. Harsh coughing    Degeneration of cervical intervertebral disc    Diverticula of colon    Essential hypertension, benign    GOUT    High blood pressure    High cholesterol    History of blood transfusion    No reaction    Hypertrophic cardiomyopathy (HCC)    Personal history of antineoplastic chemotherapy    Last treatment    Pure hypercholesterolemia    Tinnitus, bilateral    Type II or unspecified type diabetes mellitus without mention of complication, not stated as uncontrolled    Visual impairment    Glasses     Past Surgical History:   Past Surgical History:   Procedure Laterality Date    Cholecystectomy  06/21/1988    Colonoscopy  04/21/2008    tiny polyps    Colonoscopy  08/27/2014    Procedure: COLONOSCOPY;  Surgeon: Liang Quevedo MD;  Location:  ENDOSCOPY    Colonoscopy N/A 09/20/2017    Procedure: COLONOSCOPY;  Surgeon: Liang Quevedo MD;  Location:  ENDOSCOPY    Colonoscopy      Peripheral vascular screening historical conv Bilateral 05/22/2017    mild carotid narrowing, PAD screen    Upper gi endoscopy,exam      Vascular lab - dmg Bilateral 11/01/2011    PAD screening normal      Family History:   Family History   Problem Relation Age of Onset    Arthritis Father         TKA    Hypertension Father     Diabetes Father     Obesity Father     Cancer Mother 70        colon, ?uterine    Genito-Urinary Disorder Mother         hysterectomy    Substance Abuse Son     Asthma Son     High Cholesterol Son     Gastro-Intestinal Disorder Brother         colon polyps, GERD    Hypertension Brother     Neurological  Disorder Daughter         MS    Diabetes Daughter     Hypertension Sister     Diabetes Sister     Lipids Sister      Social History:    reports that he quit smoking about 38 years ago. His smoking use included cigarettes. He started smoking about 55 years ago. He has a 17 pack-year smoking history. He has never used smokeless tobacco. He reports that he does not drink alcohol and does not use drugs.       Allergies:   Allergies   Allergen Reactions    Amlodipine SWELLING     Ankle swelling on amlodipine.       Medications:    Current Facility-Administered Medications on File Prior to Encounter   Medication Dose Route Frequency Provider Last Rate Last Admin    [COMPLETED] magnesium oxide (Mag-Ox) tab 400 mg  400 mg Oral Once Jose Matt MD   400 mg at 04/07/24 1212    [COMPLETED] potassium chloride (K-Dur) tab 40 mEq  40 mEq Oral Q4H Jose Matt MD   40 mEq at 04/06/24 0949    [COMPLETED] magnesium oxide (Mag-Ox) tab 800 mg  800 mg Oral Once Jose Matt MD   800 mg at 04/06/24 0657    [COMPLETED] dexamethasone (Decadron) tab 4 mg  4 mg Oral 2 times per day Andrew Rivers MD   4 mg at 04/06/24 2124    [COMPLETED] gadoterate meglumine (Dotarem) 10 MMOL/20ML injection 20 mL  20 mL Intravenous ONCE PRN Jose Matt MD   18 mL at 04/05/24 1353    [COMPLETED] potassium chloride (K-Dur) tab 40 mEq  40 mEq Oral Once Guille Davis MD   40 mEq at 03/27/24 0806    [COMPLETED] sodium chloride 0.9% infusion   Intravenous Once Shawna Ford DO 10 mL/hr at 03/26/24 0615 New Bag at 03/26/24 0615    [COMPLETED] sodium chloride 0.9% infusion   Intravenous Once Bacilio Ornelas MD 10 mL/hr at 03/25/24 1000 New Bag at 03/25/24 1000    [COMPLETED] sodium chloride 0.9 % IV bolus 500 mL  500 mL Intravenous Once Abhi Monaco MD   Stopped at 03/24/24 1430    [COMPLETED] pantoprazole (Protonix) 40 mg in sodium chloride 0.9% PF 10 mL IV push  40 mg Intravenous Once Abhi Monaco MD   40 mg at 03/24/24 1344     [] sodium chloride 0.9% infusion   Intravenous Continuous Abhi Monaco MD        [COMPLETED] sodium chloride 0.9% infusion   Intravenous Once Vianey Otero MD 10 mL/hr at 24 1106 New Bag at 24 1106    [COMPLETED] heparin (Porcine) 100 Units/mL lock flush 500 Units  5 mL Intravenous Once Aldo Padilla MD   500 Units at 24 1356     Current Outpatient Medications on File Prior to Encounter   Medication Sig Dispense Refill    prochlorperazine (COMPAZINE) 10 mg tablet Take 1 tablet (10 mg total) by mouth every 6 (six) hours as needed for Nausea.      pantoprazole 40 MG Oral Tab EC Take 1 tablet (40 mg total) by mouth 2 (two) times daily before meals. 60 tablet 0    sucralfate 1 g Oral Tab Take 1 tablet (1 g total) by mouth 3 (three) times daily before meals. 90 tablet 0    ondansetron 4 MG Oral Tablet Dispersible Take 1 tablet (4 mg total) by mouth every 8 (eight) hours as needed for Nausea.      allopurinol 300 MG Oral Tab Take 1 tablet (300 mg total) by mouth daily. 90 tablet 1    metFORMIN HCl ER (GLUCOPHAGE XR) 500 MG Oral Tablet 24 Hr Take 1 tablet (500 mg total) by mouth daily with breakfast. 90 tablet 1    carvedilol 25 MG Oral Tab Take 1 tablet (25 mg total) by mouth 2 (two) times daily with meals. 180 tablet 1    Microlet Lancets Does not apply Misc Test daily 100 each 3    Glucose Blood (CONTOUR NEXT TEST) In Vitro Strip Test blood sugar daily 100 each 1    Glucose Blood In Vitro Strip ONE EVERY  strip 3    [] dexamethasone (DECADRON) 4 MG tablet Take 1 tablet (4 mg total) by mouth every 12 (twelve) hours for 3 days. 6 tablet 0    pantoprazole 40 MG Oral Tab EC Take 1 tablet (40 mg total) by mouth 2 (two) times daily before meals. (Patient not taking: Reported on 2024) 60 tablet 0    rosuvastatin 10 MG Oral Tab Take 1 tablet (10 mg total) by mouth daily. (Patient not taking: Reported on 2024) 90 tablet 3    Fenofibrate 54 MG Oral Tab Take 1 tablet (54  mg total) by mouth daily. with food (Patient not taking: Reported on 4/25/2024) 90 tablet 1       Review of Systems:   A comprehensive 14 point review of systems was completed.    Pertinent positives and negatives noted in the HPI.    Objective:     /62   Pulse 79   Temp 98.2 °F (36.8 °C) (Oral)   Resp 14   Wt 178 lb (80.7 kg)   SpO2 98%   BMI 27.88 kg/m²      General:  Alert, NAD, appears stated age, no accessory m use, chronically ill appearing, no conversational dyspnea   Head:  Normocephalic, without obvious abnormality, atraumatic.   Eyes:  Sclera anicteric,  EOMs intact. Lids wnl.    Ears, nose, throat:  external ears and nose within normal limits, hearing intact       Neck: Supple, symmetrical   Lungs:   Clear to auscultation bilaterally. ok effort   Chest wall:  No tenderness or deformity.   Heart:  Regular rate and rhythm, S1, S2 normal,no LE edema   Abdomen:   Soft, non-tender. Bowel sounds normal. . Non distended, no peritoneal signs   Extremities: Extremities normal, atraumatic, no edema.   Skin: Skin color, texture, turgor normal. No visible rashes     Neurologic:  Psychiatric: Low hoarse voice  appropriate affect,  answering questions ok       Results:         CBC/Chem  Recent Labs   Lab 04/25/24  1818   WBC 16.1*   HGB 14.3   MCV 89.1   .0   INR 1.11       Recent Labs   Lab 04/25/24  1818   *   K 4.6   CL 99   CO2 20.0*   BUN 64*   CREATSERUM 1.38*   *   CA 8.5       Recent Labs   Lab 04/25/24  1818   ALT 31   AST 38*   ALB 2.6*          Additional Diagnostics: ECG: NSR       Radiology: CT ABDOMEN+PELVIS(CONTRAST ONLY)(CPT=74177)    Result Date: 4/25/2024            CONCLUSION:   1. Progression of malignancy, now with multiple hepatic metastatic lesions, and and infiltrating spreading malignant appearing process in the upper abdomen including gastroduodenal region and pancreas, with stranding and nodularity in the upper abdomen, extending downward along the left  retroperitoneum para renal fascia and into the para-aortic and aortocaval retroperitoneum where there is lymphadenopathy.  2. Developing splenic infarcts, with compromise of the left splenic vein and splenic artery by tumor.  3. Left hydronephrosis, probably secondary to tumor implants around the proximal left ureter, with demonstration of enhancing soft tissue in this region, and dilation of the left ureter proximal to the lesion.  4. Small amount of likely malignant free fluid in the upper abdomen and trace amount of pelvis, but no large or drainable ascites.  5. Sclerotic metastatic bone lesions.   6. . No sign of bowel obstruction, or free air.  Small pleural effusions.  Small pericardial effusion.  Other findings as above.   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 Urinating  ok. UA contaminated.  -urology consult, appreciate    **hyponatremia- on gentle IVF, monitor    Stable chronic illnesses:  HTN/HL, CM - hold po meds for now  DMII- iss, accuchecks  Gout    PPx-scds for now    Dw pt/wife/RN Janell    Thank You,  Vianey Otero MD    [facility]  Internal Medicine  Answering Service number: [phone]    2024    Outpatient records or previous hospital records reviewed.     Further recommendations pending patient's clinical course.  [facility] hospitalist to continue to follow patient while in house    Patient and/or patient's family given opportunity to ask questions and note understanding and agreeing with therapeutic plan as outlined    Supplementary Documentation:                 Electronically signed by Vianey Otero MD on 2024  8:58 AM              Consults - MD Consult Notes      Consults signed by Roya De La Torre MD at 2024  9:16 AM     Author: Roya De La Torre MD Service: Hematology/Oncology Author Type: Physician    Filed: 2024  9:16 AM Date of Service: 2024  8:10 AM Status: Signed    : Roya De La Torre MD (Physician)     Consult Orders    1. Consult to Oncology [[id number]] ordered by Vianey Otero MD at 24 39             [facility]  Hematology Oncology Consultation  2024    Renato Hall Patient Status:  Inpatient    1950 MRN [id number]   Location [facility] 4NW-A Attending Jennifer Valladares MD   Hosp Day # 2 PCP Deyanira Resendez MD     Reason for Consultation:  Neuroendocrine tumor of stomach, melena    History of Present Illness:  Pt is a 72 yo male, pt of Dr. 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  Last office visit with Dr. Henning on 3/1, at that time he was responding to topotecan based on CT in 2024.  He was admitted to [facility] end of 2024 with GI bleed, Hgb 5's, EGD showed ulcerated cardia mass, s/p enodclips x 2.  He was readmitted  to  for  slurred speech and ataxia, found to have intracranial brain mets and leptomeningeal disease, treated with steroids.    Referred to Rad Onc as outpt, treated by Dr. Cristobal Duran, recently completed 9 of planned 10 fractions of WBRT on 4/25 and completed 8 planned fractions of palliative RT to stomach to control bleeding.  He missed his 10th fraction of brain RT on 4/26 due to being in ED.  Outpt CT on 4/11 for RT planning with progression of disease compared to Jan, new liver mets, enlarging gastrohepatic node, new thickening of L adrenal gland, sclerotic lesions in bilateral iliac wings (new?) and L1 (seen prior).    Hgb 8's upon discharge in March, stable in 9's during readmission in April.  Pt presented to ED on 4/25 with melena, generalized weakness.  Initial Hgb here 14 --> 11's, stable at 10.5 today.  Pt not seen on 4/27 as he was down at EGD, which showed stricture at GEJ due to gastric mass, diffusely friable gastric mass encompassing majority of cardia, body, fundus, very friable but no active bleeding.  CT a/p here (compared to 2023) with progression of disease, new liver mets, infiltrating malignant process in upper abd, extending to L retroperitoneal LAD, developing splenic infarcts with compromise of L splenic vein and splenic artery by tumor, L hydronephrosis, possibly 2ard to tumor implants around the proximal L ureter, sclerotic metastatic bone lesions.    History taken from pt and wife.  He has been generally weak during radiation, PS 2-3, at home sits up in recliner.      History:  Past Medical History:    Abnormal screening CT of heart    calcium score 6    Abnormal screening CT of heart    calcium score of 18    Abnormal screening CT of heart    calcium score 53    Adenomatous colon polyp    Allergic rhinitis due to pollen    BPH (benign prostatic hyperplasia)    Cancer (HCC)    Neuroendocrine CA/Pancreatic    Cardiomyopathy (HCC)    2D Echo 9/22/23    COVID    No hospitalization. Harsh coughing     Degeneration of cervical intervertebral disc    Diverticula of colon    Essential hypertension, benign    GOUT    High blood pressure    High cholesterol    History of blood transfusion    No reaction    Hypertrophic cardiomyopathy (HCC)    Personal history of antineoplastic chemotherapy    Last treatment    Pure hypercholesterolemia    Tinnitus, bilateral    Type II or unspecified type diabetes mellitus without mention of complication, not stated as uncontrolled    Visual impairment    Glasses     Past Surgical History:   Procedure Laterality Date    Cholecystectomy  06/21/1988    Colonoscopy  04/21/2008    tiny polyps    Colonoscopy  08/27/2014    Procedure: COLONOSCOPY;  Surgeon: Liang Quevedo MD;  Location:  ENDOSCOPY    Colonoscopy N/A 09/20/2017    Procedure: COLONOSCOPY;  Surgeon: Liang Quevedo MD;  Location:  ENDOSCOPY    Colonoscopy      Peripheral vascular screening historical conv Bilateral 05/22/2017    mild carotid narrowing, PAD screen    Upper gi endoscopy,exam      Vascular lab - dmg Bilateral 11/01/2011    PAD screening normal     Family History   Problem Relation Age of Onset    Arthritis Father         TKA    Hypertension Father     Diabetes Father     Obesity Father     Cancer Mother 70        colon, ?uterine    Genito-Urinary Disorder Mother         hysterectomy    Substance Abuse Son     Asthma Son     High Cholesterol Son     Gastro-Intestinal Disorder Brother         colon polyps, GERD    Hypertension Brother     Neurological Disorder Daughter         MS    Diabetes Daughter     Hypertension Sister     Diabetes Sister     Lipids Sister       reports that he quit smoking about 38 years ago. His smoking use included cigarettes. He started smoking about 55 years ago. He has a 17 pack-year smoking history. He has never used smokeless tobacco. He reports that he does not drink alcohol and does not use drugs.    Allergies:  Allergies   Allergen Reactions    Amlodipine SWELLING      Ankle swelling on amlodipine.       Medications:    Current Facility-Administered Medications:     heparin (Porcine) 100 Units/mL lock flush 500 Units, 5 mL, Intravenous, PRN    sodium chloride 0.9% infusion, , Intravenous, Continuous    ondansetron (Zofran) 4 MG/2ML injection 4 mg, 4 mg, Intravenous, Q6H PRN    metoclopramide (Reglan) 5 mg/mL injection 10 mg, 10 mg, Intravenous, Q8H PRN    polyethylene glycol (PEG 3350) (Miralax) 17 g oral packet 17 g, 17 g, Oral, Daily PRN    sennosides (Senokot) tab 17.2 mg, 17.2 mg, Oral, Nightly PRN    bisacodyl (Dulcolax) 10 MG rectal suppository 10 mg, 10 mg, Rectal, Daily PRN    fleet enema (Fleet) 7-19 GM/118ML rectal enema 133 mL, 1 enema, Rectal, Once PRN    pantoprazole (Protonix) 40 mg in sodium chloride 0.9% PF 10 mL IV push, 40 mg, Intravenous, Q12H    piperacillin-tazobactam (Zosyn) 3.375 g in dextrose 5% 100 mL IVPB-ADDV, 3.375 g, Intravenous, Q8H    glucose (Dex4) 15 GM/59ML oral liquid 15 g, 15 g, Oral, Q15 Min PRN **OR** glucose (Glutose) 40% oral gel 15 g, 15 g, Oral, Q15 Min PRN **OR** glucose-vitamin C (Dex-4) chewable tab 4 tablet, 4 tablet, Oral, Q15 Min PRN **OR** dextrose 50% injection 50 mL, 50 mL, Intravenous, Q15 Min PRN **OR** glucose (Dex4) 15 GM/59ML oral liquid 30 g, 30 g, Oral, Q15 Min PRN **OR** glucose (Glutose) 40% oral gel 30 g, 30 g, Oral, Q15 Min PRN **OR** glucose-vitamin C (Dex-4) chewable tab 8 tablet, 8 tablet, Oral, Q15 Min PRN    insulin aspart (NovoLOG) 100 Units/mL FlexPen 1-5 Units, 1-5 Units, Subcutaneous, TID AC and HS    Review of Systems:  A 10-point review of systems was done with pertinent positives and negatives per the HPI.    Physical Exam:   Blood pressure 103/55, pulse 68, temperature 97.8 °F (36.6 °C), temperature source Oral, resp. rate 16, weight 178 lb (80.7 kg), SpO2 96%.  General: Patient is weak appearing, alert and oriented x 3, not in acute distress.  HEENT: EOMs intact. PERRL. Oropharynx is clear.   Neck: No  palpable lymphadenopathy. Neck is supple.  Chest: Clear to auscultation.  Heart: Regular rate and rhythm.   Abdomen: Soft, non tender with good bowel sounds.  Extremities: Pedal pulses are present. No edema.  Neurological: Grossly intact.   Lymphatics: There is no palpable lymphadenopathy throughout in the cervical or supraclavicular, regions.  ECOG PS: 2-3    Laboratory Data:    Lab Results   Component Value Date    WBC 7.2 04/28/2024    HGB 10.5 04/28/2024    HCT 32.9 04/28/2024    PLT 78.0 04/28/2024    PGLU 165 04/28/2024       Recent Labs   Lab 04/25/24  1818 04/27/24  0516 04/27/24  0856 04/28/24  0745   RBC 4.69 3.65* 3.83 3.45*   HGB 14.3 11.2* 11.9* 10.5*   HCT 41.8 32.8* 37.3* 32.9*   MCV 89.1 89.9 97.4 95.4   MCH 30.5 30.7 31.1 30.4   MCHC 34.2 34.1 31.9 31.9   RDW 15.8 16.2 16.8 16.6   NEPRELIM 15.04*  --  8.06* 6.78   WBC 16.1* 7.5 9.1 7.2   .0 90.0* 67.0* 78.0*         Recent Labs   Lab 04/25/24 1818 04/27/24  0516   * 164*   BUN 64* 44*   CREATSERUM 1.38* 1.30   EGFRCR 54* 58*   CA 8.5 8.1*   ALB 2.6* 2.0*   * 135*   K 4.6 3.2*   CL 99 107   CO2 20.0* 20.0*   ALKPHO 107 86   AST 38* 23   ALT 31 24   BILT 0.9 0.6   TP 5.7* 4.4*       Imaging:  reviewed    Impression and Plan:    Metastatic poorly differentiated neuroendocrine tumor of stomach  Progression of disease on 2nd line topotecan  Intracranial and leptomeningeal mets, s/p WBRT, completed 4/25  GI bleed from tumor in March 2024, s/p palliative RT to gastric tumor    Discussion held with pt and his wife, explained to them he has progression of disease (new liver mets), current poor PS precludes further treatment.  He understands he is too weak for chemotherapy and does not want further procedures.  Offered palliative care consult and they will consider this.  Hospice is appropriate.    Will confirm outpt dose of steroids he was on for brain mets (wife states he was on 4 mg q8 and has not been instructed to taper yet) and  resume this as inpt.  Thrombocytopenia - not due to chemo as last treatment over 1 month ago and plts had recovered.  May be consumptive due to GI bleed.  Currently no active bleeding, no need for PRBC or plts transfusion today.  Check CBC daily.  Continue discussion on goals of care, pt and wife are realistic about his poor prognosis.    Dr. Rivers to cover starting Mon 4/29.    Please do not hesitate to contact me directly with any specific questions or concerns.    Roya De La Torre MD  [facility]      Electronically signed by Roya De La Torre MD on 4/28/2024  9:16 AM           D/C Summary    No notes of this type exist for this encounter.     Physical Therapy Notes (last 72 hours)  Notes from 4/27/2024 11:40 AM through 4/30/2024 11:40 AM   No notes of this type exist for this encounter.     Occupational Therapy Notes (last 72 hours)  Notes from 4/27/2024 11:40 AM through 4/30/2024 11:40 AM   No notes of this type exist for this encounter.        Video Swallow Study Note - Video Swallow Study Notes      Video Swallow Study Note signed by Elier Mahoney SLP at 4/29/2024 11:26 AM  Version 1 of 1    Author: Elier Mahoney SLP Service: Rehab Author Type: Speech and Language Pathologist    Filed: 4/29/2024 11:26 AM Date of Service: 4/29/2024  9:03 AM Status: Signed    : Elier Mahoney SLP (Speech and Language Pathologist)       ADULT VIDEOFLUOROSCOPIC SWALLOWING STUDY    Admission Date: 4/25/2024  Evaluation Date: 04/29/24  Radiologist: Madhuri    RECOMMENDATIONS   Diet Recommendations - Solids: Regular  Diet Recommendations - Liquids: Nectar thick liquids/ Mildly thick    Further Follow-up:  Follow Up Needed (Documentation Required): Yes  SLP Follow-up Date: 04/30/24  Compensatory Strategies Recommended: Small bites and sips  Aspiration Precautions: Upright position  Medication Administration Recommendations: One pill at a time (w/ puree prn)  Treatment Plan/Recommendations:  Dysphagia therapy    HISTORY   Background/Objective Information: Patient is a 74 y/o male known to this service for prior dysphagia assessment. Patient reported frequent coughing/choking with drinking prior to hospitalization. No overt s/s of aspiration observed at bedside, but VFSS recommended given patient's c/o frequent s/s of aspiration at baseline.    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 Harsh coughing    Degeneration of cervical intervertebral disc    Diverticula of colon    Essential hypertension, benign    GOUT    High blood pressure    High cholesterol    History of blood transfusion    No reaction    Hypertrophic cardiomyopathy (HCC)    Personal history of antineoplastic chemotherapy    Last treatment    Pure hypercholesterolemia    Tinnitus, bilateral    Type II or unspecified type diabetes mellitus without mention of complication, not stated as uncontrolled    Visual impairment    Glasses       Current Diet Consistency: Regular;Thin liquids  Prior Level of Function: Assistance/Support for ADL's  Prior Living Situation: Home with spouse  History of Recent: No recent respiratory difficulty  Precautions: Aspiration  Imaging results:   CT A+P 4/25/24  CONCLUSION:       1. Progression of malignancy, now with multiple hepatic metastatic lesions, and and infiltrating spreading malignant appearing process in the upper abdomen including  gastroduodenal region and pancreas, with stranding and nodularity in the upper abdomen,   extending downward along the left retroperitoneum para renal fascia and into the para-aortic and aortocaval retroperitoneum where there is lymphadenopathy.      2. Developing splenic infarcts, with compromise of the left splenic vein and splenic artery by tumor.      3. Left hydronephrosis, probably secondary to tumor implants around the proximal left ureter, with demonstration of enhancing soft tissue in this region, and dilation of the left ureter proximal to the lesion.      4. Small amount of likely malignant free fluid in the upper abdomen and trace amount of pelvis, but no large or drainable ascites.      5. Sclerotic metastatic bone lesions.       6. . No sign of bowel obstruction, or free air.  Small pleural effusions.  Small pericardial effusion.  Other findings as above.         LOCATION:  [facility]         Dictated by (CST): Handy Dhaliwal MD on 4/25/2024 at 10:18 PM       Finalized by (CST): Handy Dhaliwal MD on 4/25/2024 at 10:31 PM     Reason for Referral: R/O aspiration    Family/Patient Goals: none stated     ASSESSMENT   DYSPHAGIA ASSESSMENT  Test completed in conjunction with Radiologist.  Patient Positioned: Upright;Midline.  Patient Viewed: Lateral.  Patient Alertness: Fully alert.  Consistencies Presented: Thin liquids;Nectar thick liquids/ Mildly thick;Puree;Hard solid to assess oropharyngeal swallow function and assess for compensatory strategies to improve safe swallow function.    THIN LIQUIDS  Method of Presentation: Cup;Straw  Oral Phase of Swallow (VFSS - Thin Liquids): Impaired  Bolus Propulsion (VFSS - Thin Liquids): Impaired  Triggered at: Pyriform sinuses  Laryngeal Penetration: Before the swallow  Tracheal Aspiration: Before the swallow  Cough/Throat Clear Response: Yes  Cough/Throat Clear Effective: Partially  NECTAR THICK LIQUIDS/ MILDLY THICK  Method of Presentation: Cup;Straw  Oral Phase of  Swallow (VFSS - Nectar Thick Liquids): Impaired  Bolus Propulsion (VFSS - Nectar Thick Liquids): Impaired  Triggered at: Valleculae  Residue Severity, Location: Mild;Valleculae  Laryngeal Penetration: Transient  Tracheal Aspiration: None     PUREE  Oral Phase of Swallow (VFSS - Puree): Impaired  Bolus Propulsion (VFSS - Puree): Impaired  Triggered at: Base of tongue  Residue Severity, Location: Mild/Mod;Valleculae  Cleared/Reduced with: Secondary swallow  Laryngeal Penetration: None  Tracheal Aspiration: None     HARD SOLID  Oral Phase of Swallow (VFSS - Hard Solid): Impaired  Bolus Propulsion (VFSS - Hard Solid): Impaired  Triggered at: Valleculae  Residue Severity, Location: Mild/Mod;Valleculae  Cleared/Reduced with: Secondary swallow  Laryngeal Penetration: None  Tracheal Aspiration: None  Penetration Aspiration Scale Score: Score 6: Material enters the airway, passes below the vocal folds, and is ejected into the larynx or out of the airway       Overall Impression:   Patient presented with oropharyngeal dysphagia characterized by impaired AP bolus transit, delayed pharyngeal swallow initiation and reduced base of tongue retraction. Bolus acceptance was adequate without evidence of anterior bolus loss. AP bolus transit was prolonged and uncoordinated. Pharyngeal swallow initiated at the level of the pyriform sinuses with thin liquids. This resulted in deep penetration, and one instance of trace aspiration, before the swallow. Patient with immediate reflexive throat clear following aspiration. Timing of pharyngeal swallow improved with mildly thick liquids. Flash penetration observed, but cleared with cessation of the swallow. Mild to moderate vallecular residue was cleared with secondary swallow.     Patient appears safe to initiate a regular diet and mildly thick liquids. Bolus size and rate of intake should be limited. SLP will continue to follow to monitor diet tolerance and initiate trial of dysphagia  therapy. Education provided re: results and recommendations. Discussed rationale for trial of mildly thick liquids to reduce symptoms of aspiration with PO intake. Patient verbalized understanding.               GOALS  Goal #1 The patient will tolerate regular consistency and thin liquids without overt signs or symptoms of aspiration with 95 % accuracy over 1-2 session(s).  In Progress   Goal #2 The patient/family/caregiver will demonstrate understanding and implementation of aspiration precautions and swallow strategies independently over 1-2 session(s).     In Progress   Goal #3 VFSS if agreeable  Met   Goal #4  Patient will complete pharyngeal strengthening exercises with 80% accuracy when provided mild cues within 3 visits.  In Progress   Goal #5       Goal #6       Goal #7       Goal #8          EDUCATION/INSTRUCTION  Reviewed results and recommendations with patient/family/caregiver.  Agreement/Understanding verbalized and all questions answered to their apparent satisfaction.    INTERDISCIPLINARY COMMUNICATION  Reviewed results with Radiologist; agreement verbalized.    Patient Experiencing Pain: No                FOLLOW UP  Treatment Plan/Recommendations: Dysphagia therapy       Thank you for your referral.   If you have any questions, please contact JENNIFER Benitez Derek, SLP              SLP Note - SLP Notes      SLP Note signed by Layla Chambers SLP at 4/27/2024 10:45 AM  Version 1 of 1    Author: Reyes, Layla, SLP Service: Rehab Author Type: SPEECH-LANGUAGE PATHOLOGIST    Filed: 4/27/2024 10:45 AM Date of Service: 4/27/2024 10:44 AM Status: Signed    : Layla Chambers SLP (SPEECH-LANGUAGE PATHOLOGIST)       Attempted to see pt for VFSS. VFSS put on hold per GI for possible EGD. Will hold VFSS until Monday. Radiology aware. D/w pt's RN.     Electronically signed by Layla Chambers SLP on 4/27/2024 10:45 AM           Immunizations     Name Date      Covid-19 Pfizer 12/20/21     Covid-19  Pfizer 04/06/21     Covid-19 Pfizer 03/16/21     Covid-19 Pfizer Bivalent 10/31/22     INFLUENZA 09/13/21     INFLUENZA 09/10/20     INFLUENZA 10/07/19     INFLUENZA 10/03/18     INFLUENZA 10/04/17     INFLUENZA 09/12/16     INFLUENZA 09/17/15     INFLUENZA 10/09/14     INFLUENZA 12/05/13     Pneumococcal (Prevnar 13) 03/17/16     Pneumovax 23 10/04/17     TD 12/05/07     TDAP 06/16/21     Zoster Vaccine Live (Zostavax) 12/05/13       Multidisciplinary Problems     Active Goals     Not on file

## (undated) NOTE — LETTER
[address]  [address]  Authorization for Surgical Operation and Procedure     Date:___________                                                                                                         Time:__________  I hereby authorize Surgeon(s):  Aris Rod MD, my physician and his/her assistants (if applicable), which may include medical students, residents, and/or fellows, to perform the following surgical operation/ procedure and administer such anesthesia as may be determined necessary by my physician:  Operation/Procedure name (s) Procedure(s):  ESOPHAGOGASTRODUODENOSCOPY (EGD) on Renato Hall   2.   I recognize that during the surgical operation/procedure, unforeseen conditions may necessitate additional or different procedures than those listed above.  I, therefore, further authorize and request that the above-named surgeon, assistants, or designees perform such procedures as are, in their judgment, necessary and desirable.    3.   My surgeon/physician has discussed prior to my surgery the potential benefits, risks and side effects of this procedure; the likelihood of achieving goals; and potential problems that might occur during recuperation.  They also discussed reasonable alternatives to the procedure, including risks, benefits, and side effects related to the alternatives and risks related to not receiving this procedure.  I have had all my questions answered and I acknowledge that no guarantee has been made as to the result that may be obtained.    4.   Should the need arise during my operation/procedure, which includes change of level of care prior to discharge, I also consent to the administration of blood and/or blood products.  Further, I understand that despite careful testing and screening of blood or blood products by collecting agencies, I may still be subject to ill effects as a result of receiving a blood transfusion and/or blood products.  The  following are some, but not all, of the potential risks that can occur: fever and allergic reactions, hemolytic reactions, transmission of diseases such as Hepatitis, AIDS and Cytomegalovirus (CMV) and fluid overload.  In the event that I wish to have an autologous transfusion of my own blood, or a directed donor transfusion, I will discuss this with my physician.  Check only if Refusing Blood or Blood Products  I understand refusal of blood or blood products as deemed necessary by my physician may have serious consequences to my condition to include possible death. I hereby assume responsibility for my refusal and release the hospital, its personnel, and my physicians from any responsibility for the consequences of my refusal.          o  Refuse      5.   I authorize the use of any specimen, organs, tissues, body parts or foreign objects that may be removed from my body during the operation/procedure for diagnosis, research or teaching purposes and their subsequent disposal by hospital authorities.  I also authorize the release of specimen test results and/or written reports to my treating physician on the hospital medical staff or other referring or consulting physicians involved in my care, at the discretion of the Pathologist or my treating physician.    6.   I consent to the photographing or videotaping of the operations or procedures to be performed, including appropriate portions of my body for medical, scientific, or educational purposes, provided my identity is not revealed by the pictures or by descriptive texts accompanying them.  If the procedure has been photographed/videotaped, the surgeon will obtain the original picture, image, videotape or CD.  The hospital will not be responsible for storage, release or maintenance of the picture, image, tape or CD.    7.   I consent to the presence of a  or observers in the operating room as deemed necessary by my physician or their designees.     8.   I recognize that in the event my procedure results in extended X-Ray/fluoroscopy time, I may develop a skin reaction.    9. If I have a Do Not Attempt Resuscitation (DNAR) order in place, that status will be suspended while in the operating room, procedural suite, and during the recovery period unless otherwise explicitly stated by me (or a person authorized to consent on my behalf). The surgeon or my attending physician will determine when the applicable recovery period ends for purposes of reinstating the DNAR order.  10. Patients having a sterilization procedure: I understand that if the procedure is successful the results will be permanent and it will therefore be impossible for me to inseminate, conceive, or bear children.  I also understand that the procedure is intended to result in sterility, although the result has not been guaranteed.   11. I acknowledge that my physician has explained sedation/analgesia administration to me including the risk and benefits I consent to the administration of sedation/analgesia as may be necessary or desirable in the judgment of my physician.    I CERTIFY THAT I HAVE READ AND FULLY UNDERSTAND THE ABOVE CONSENT TO OPERATION and/or OTHER PROCEDURE.    _________________________________________  __________________________________  Signature of Patient     Signature of Responsible Person         ___________________________________         Printed Name of Responsible Person           _________________________________                 Relationship to Patient  _________________________________________  ______________________________  Signature of Witness          Date  Time      Patient Name: Renato Hall     : 1950                 Printed: 2024     Medical Record #: [id number]                     Page [address] of [address] [place], [place] [address], [place]  [address]    Consent for Anesthesia    I, Renato Hall agree to be  cared for by an anesthesiologist, who is specially trained to monitor me and give me medicine to put me to sleep or keep me comfortable during my procedure    I understand that my anesthesiologist is not an employee or agent of [facility] or [employer]. He or she works for [employer].    As the patient asking for anesthesia services, I agree to:  Allow the anesthesiologist (anesthesia doctor) to give me medicine and do additional procedures as necessary. Some examples are: Starting or using an “IV” to give me medicine, fluids or blood during my procedure, and having a breathing tube placed to help me breathe when I’m asleep (intubation). In the event that my heart stops working properly, I understand that my anesthesiologist will make every effort to sustain my life, unless otherwise directed by [facility] Do Not Resuscitate documents.  Tell my anesthesia doctor before my procedure:  If I am pregnant.  The last time that I ate or drank.  All of the medicines I take (including prescriptions, herbal supplements, and pills I can buy without a prescription (including street drugs/illegal medications). Failure to inform my anesthesiologist about these medicines may increase my risk of anesthetic complications.  If I am allergic to anything or have had a reaction to anesthesia before.  I understand how the anesthesia medicine will help me (benefits).  I understand that with any type of anesthesia medicine there are risks:  The most common risks are: nausea, vomiting, sore throat, muscle soreness, damage to my eyes, mouth, or teeth (from breathing tube placement).  Rare risks include: remembering what happened during my procedure, allergic reactions to medications, injury to my airway, heart, lungs, vision, nerves, or muscles and in extremely rare instances death.  My doctor has explained to me other choices available to me for my care (alternatives).  Pregnant Patients  (“epidural”):  I understand that the risks of having an epidural (medicine given into my back to help control pain during labor), include itching, low blood pressure, difficulty urinating, headache or slowing of the baby’s heart. Very rare risks include infection, bleeding, seizure, irregular heart rhythms and nerve injury.  Regional Anesthesia (“spinal”, “epidural”, & “nerve blocks”):  I understand that rare but potential complications include headache, bleeding, infection, seizure, irregular heart rhythms, and nerve injury.    I can change my mind about having anesthesia services at any time before I get the medicine.    _____________________________________________________________________________  Patient (or Representative) Signature/Relationship to Patient  Date   Time    _____________________________________________________________________________   Name (if used)    Language/Organization   Time    _____________________________________________________________________________  Anesthesiologist Signature     Date   Time  I have discussed the procedure and information above with the patient (or patient’s representative) and answered their questions. The patient or their representative has agreed to have anesthesia services.    _____________________________________________________________________________  Witness        Date   Time  I have verified that the signature is that of the patient or patient’s representative, and that it was signed before the procedure  Patient Name: Renato Hall     : 1950                 Printed: 2024     Medical Record #: [id number]                     Page 2 of 2